# Patient Record
Sex: FEMALE | Race: WHITE | Employment: UNEMPLOYED | URBAN - METROPOLITAN AREA
[De-identification: names, ages, dates, MRNs, and addresses within clinical notes are randomized per-mention and may not be internally consistent; named-entity substitution may affect disease eponyms.]

---

## 2018-07-14 ENCOUNTER — APPOINTMENT (OUTPATIENT)
Dept: CT IMAGING | Age: 78
End: 2018-07-14
Payer: MEDICARE

## 2018-07-14 ENCOUNTER — HOSPITAL ENCOUNTER (EMERGENCY)
Age: 78
Discharge: HOME | End: 2018-07-14
Attending: EMERGENCY MEDICINE
Payer: MEDICARE

## 2018-07-14 VITALS
HEIGHT: 62 IN | OXYGEN SATURATION: 97 % | BODY MASS INDEX: 23.55 KG/M2 | SYSTOLIC BLOOD PRESSURE: 132 MMHG | TEMPERATURE: 98.1 F | WEIGHT: 128 LBS | DIASTOLIC BLOOD PRESSURE: 66 MMHG | HEART RATE: 67 BPM | RESPIRATION RATE: 16 BRPM

## 2018-07-14 DIAGNOSIS — M54.5 LOW BACK PAIN, UNSPECIFIED BACK PAIN LATERALITY, UNSPECIFIED CHRONICITY, WITH SCIATICA PRESENCE UNSPECIFIED: Primary | ICD-10-CM

## 2018-07-14 DIAGNOSIS — M51.36 LUMBAR DEGENERATIVE DISC DISEASE: ICD-10-CM

## 2018-07-14 DIAGNOSIS — M51.26 LUMBAR DISC HERNIATION: ICD-10-CM

## 2018-07-14 LAB
BACTERIA: NORMAL /HPF
BILIRUBIN URINE: NEGATIVE
BLOOD, URINE: NORMAL
CLARITY: CLEAR
COLOR: YELLOW
GLUCOSE URINE: NEGATIVE MG/DL
KETONES, URINE: NEGATIVE MG/DL
LEUKOCYTE ESTERASE, URINE: NEGATIVE
NITRITE, URINE: NEGATIVE
PH UA: 6 (ref 5–9)
PROTEIN UA: NEGATIVE MG/DL
RBC UA: NORMAL /HPF (ref 0–2)
SPECIFIC GRAVITY UA: 1.01 (ref 1–1.03)
UROBILINOGEN, URINE: 0.2 E.U./DL
WBC UA: NORMAL /HPF (ref 0–5)

## 2018-07-14 PROCEDURE — 81001 URINALYSIS AUTO W/SCOPE: CPT

## 2018-07-14 PROCEDURE — 6370000000 HC RX 637 (ALT 250 FOR IP): Performed by: EMERGENCY MEDICINE

## 2018-07-14 PROCEDURE — 72131 CT LUMBAR SPINE W/O DYE: CPT

## 2018-07-14 PROCEDURE — 99284 EMERGENCY DEPT VISIT MOD MDM: CPT

## 2018-07-14 RX ORDER — HYDROCODONE BITARTRATE AND ACETAMINOPHEN 5; 325 MG/1; MG/1
1 TABLET ORAL EVERY 6 HOURS PRN
Qty: 12 TABLET | Refills: 0 | Status: SHIPPED | OUTPATIENT
Start: 2018-07-14 | End: 2018-07-17

## 2018-07-14 RX ORDER — OXYCODONE HYDROCHLORIDE AND ACETAMINOPHEN 5; 325 MG/1; MG/1
1 TABLET ORAL ONCE
Status: COMPLETED | OUTPATIENT
Start: 2018-07-14 | End: 2018-07-14

## 2018-07-14 RX ORDER — PREDNISONE 20 MG/1
TABLET ORAL
Qty: 6 TABLET | Refills: 0 | Status: SHIPPED | OUTPATIENT
Start: 2018-07-14 | End: 2019-06-12 | Stop reason: ALTCHOICE

## 2018-07-14 RX ORDER — CYCLOBENZAPRINE HCL 5 MG
5 TABLET ORAL 3 TIMES DAILY PRN
Qty: 20 TABLET | Refills: 0 | Status: SHIPPED | OUTPATIENT
Start: 2018-07-14 | End: 2018-07-24

## 2018-07-14 RX ADMIN — OXYCODONE HYDROCHLORIDE AND ACETAMINOPHEN 1 TABLET: 5; 325 TABLET ORAL at 10:41

## 2018-07-14 ASSESSMENT — PAIN SCALES - GENERAL
PAINLEVEL_OUTOF10: 9
PAINLEVEL_OUTOF10: 9
PAINLEVEL_OUTOF10: 2

## 2018-07-14 ASSESSMENT — ENCOUNTER SYMPTOMS
NAUSEA: 0
EYE PAIN: 0
RHINORRHEA: 0
SHORTNESS OF BREATH: 0
SINUS PRESSURE: 0
EYE DISCHARGE: 0
DIARRHEA: 0
COUGH: 0
BACK PAIN: 1
ABDOMINAL PAIN: 0
EYE REDNESS: 0
ABDOMINAL DISTENTION: 0
SORE THROAT: 0
BLOOD IN STOOL: 0
VOMITING: 0
WHEEZING: 0

## 2018-07-14 ASSESSMENT — PAIN DESCRIPTION - FREQUENCY: FREQUENCY: CONTINUOUS

## 2018-07-14 ASSESSMENT — PAIN DESCRIPTION - LOCATION: LOCATION: BACK

## 2018-07-14 ASSESSMENT — PAIN DESCRIPTION - DESCRIPTORS: DESCRIPTORS: ACHING

## 2018-07-14 ASSESSMENT — PAIN DESCRIPTION - PAIN TYPE: TYPE: ACUTE PAIN

## 2018-07-14 ASSESSMENT — PAIN DESCRIPTION - ORIENTATION: ORIENTATION: RIGHT;LEFT;LOWER

## 2018-07-14 NOTE — ED PROVIDER NOTES
Patient is a 49-year-old female here with low back pain that is achy in nature and started about 2 weeks ago. She states her pain does not radiate. She states that movement makes her pain worse and rest makes it better. Patient states about 3 days ago she went to her primary care physician and was found to have urinary tract infection for which she was started on Keflex and has been taking it. Patient states her pain is different than previously. She states that she has history of lumbar spine compression fractures with last one in 2014. She states she also has had surgeries for her low back with last one in 2014. She states that she has history of osteoporosis. She states that about 2 weeks ago she was sweeping which she has done previously. She complains of bloody urine as well as increased urinary frequency. She denies falls, injuries, fever, chills, sweats, nausea/vomiting, diarrhea, constipation, bloody stool, chest pain/pressure, shortness of breath, lightheadedness, history of aneurysms, or current/prior tobacco use. Patient states that she has history of A. fib for which she is on Coumadin and has been taking it. Review of Systems   Constitutional: Negative for chills, diaphoresis and fever. HENT: Negative for ear pain, hearing loss, rhinorrhea, sinus pressure and sore throat. Eyes: Negative for pain, discharge, redness and visual disturbance. Respiratory: Negative for cough, shortness of breath and wheezing. Cardiovascular: Negative for chest pain and leg swelling. Gastrointestinal: Negative for abdominal distention, abdominal pain, blood in stool, diarrhea, nausea and vomiting. Genitourinary: Positive for dysuria, frequency and hematuria. Musculoskeletal: Positive for back pain and myalgias. Negative for arthralgias. Skin: Negative for rash and wound. Neurological: Negative for syncope, weakness, light-headedness, numbness and headaches.    Hematological: Negative for every 6 hours as needed for Pain for up to 3 days. Tobias Clements PREDNISONE (DELTASONE) 20 MG TABLET    Take 40mg daily for 3 days       Diagnosis:  1. Low back pain, unspecified back pain laterality, unspecified chronicity, with sciatica presence unspecified    2. Lumbar disc herniation    3. Lumbar degenerative disc disease        Disposition:  Patient's disposition: Discharge to home  Patient's condition is stable.          Shruti Dinero,   Resident  07/14/18 1137

## 2018-12-10 ENCOUNTER — HOSPITAL ENCOUNTER (OUTPATIENT)
Dept: CT IMAGING | Age: 78
Discharge: HOME | End: 2018-12-12
Payer: MEDICARE

## 2018-12-10 DIAGNOSIS — Z82.49 FAMILY HISTORY OF ISCHEMIC HEART DISEASE: ICD-10-CM

## 2018-12-10 DIAGNOSIS — R07.89 OTHER CHEST PAIN: ICD-10-CM

## 2018-12-10 PROCEDURE — 2580000003 HC RX 258: Performed by: RADIOLOGY

## 2018-12-10 PROCEDURE — 71275 CT ANGIOGRAPHY CHEST: CPT

## 2018-12-10 PROCEDURE — 6360000004 HC RX CONTRAST MEDICATION: Performed by: RADIOLOGY

## 2018-12-10 RX ORDER — SODIUM CHLORIDE 0.9 % (FLUSH) 0.9 %
10 SYRINGE (ML) INJECTION 2 TIMES DAILY
Status: DISCONTINUED | OUTPATIENT
Start: 2018-12-10 | End: 2018-12-13 | Stop reason: HOSPADM

## 2018-12-10 RX ADMIN — Medication 10 ML: at 09:52

## 2018-12-10 RX ADMIN — IOPAMIDOL 100 ML: 755 INJECTION, SOLUTION INTRAVENOUS at 09:52

## 2018-12-13 ENCOUNTER — HOSPITAL ENCOUNTER (OUTPATIENT)
Dept: MRI IMAGING | Age: 78
Discharge: HOME | End: 2018-12-15
Payer: MEDICARE

## 2018-12-13 DIAGNOSIS — D48.7 NEOPLASM OF UNCERTAIN BEHAVIOR OF OTHER SPECIFIED SITES: ICD-10-CM

## 2018-12-13 PROCEDURE — 6360000004 HC RX CONTRAST MEDICATION: Performed by: RADIOLOGY

## 2018-12-13 PROCEDURE — 2580000003 HC RX 258: Performed by: RADIOLOGY

## 2018-12-13 PROCEDURE — 74183 MRI ABD W/O CNTR FLWD CNTR: CPT

## 2018-12-13 PROCEDURE — A9579 GAD-BASE MR CONTRAST NOS,1ML: HCPCS | Performed by: RADIOLOGY

## 2018-12-13 RX ORDER — SODIUM CHLORIDE 0.9 % (FLUSH) 0.9 %
10 SYRINGE (ML) INJECTION 2 TIMES DAILY
Status: DISCONTINUED | OUTPATIENT
Start: 2018-12-13 | End: 2018-12-16 | Stop reason: HOSPADM

## 2018-12-13 RX ADMIN — GADOTERIDOL 20 ML: 279.3 INJECTION, SOLUTION INTRAVENOUS at 11:30

## 2018-12-13 RX ADMIN — Medication 10 ML: at 11:30

## 2019-06-12 ENCOUNTER — APPOINTMENT (OUTPATIENT)
Dept: CT IMAGING | Age: 79
DRG: 517 | End: 2019-06-12
Payer: MEDICARE

## 2019-06-12 ENCOUNTER — HOSPITAL ENCOUNTER (INPATIENT)
Age: 79
LOS: 6 days | Discharge: HOME HEALTH | DRG: 517 | End: 2019-06-18
Attending: EMERGENCY MEDICINE | Admitting: FAMILY MEDICINE
Payer: MEDICARE

## 2019-06-12 ENCOUNTER — APPOINTMENT (OUTPATIENT)
Dept: MRI IMAGING | Age: 79
DRG: 517 | End: 2019-06-12
Payer: MEDICARE

## 2019-06-12 DIAGNOSIS — S32.010A CLOSED COMPRESSION FRACTURE OF FIRST LUMBAR VERTEBRA, INITIAL ENCOUNTER: Primary | ICD-10-CM

## 2019-06-12 DIAGNOSIS — S32.000A LUMBAR COMPRESSION FRACTURE, CLOSED, INITIAL ENCOUNTER (HCC): ICD-10-CM

## 2019-06-12 LAB
APTT: 38.9 SEC (ref 24.5–35.1)
BASOPHILS ABSOLUTE: 0.03 E9/L (ref 0–0.2)
BASOPHILS RELATIVE PERCENT: 0.4 % (ref 0–2)
EOSINOPHILS ABSOLUTE: 0.02 E9/L (ref 0.05–0.5)
EOSINOPHILS RELATIVE PERCENT: 0.2 % (ref 0–6)
HCT VFR BLD CALC: 39.5 % (ref 34–48)
HEMOGLOBIN: 13 G/DL (ref 11.5–15.5)
IMMATURE GRANULOCYTES #: 0.02 E9/L
IMMATURE GRANULOCYTES %: 0.2 % (ref 0–5)
INR BLD: 3.3
LYMPHOCYTES ABSOLUTE: 1.09 E9/L (ref 1.5–4)
LYMPHOCYTES RELATIVE PERCENT: 13.3 % (ref 20–42)
MCH RBC QN AUTO: 28.4 PG (ref 26–35)
MCHC RBC AUTO-ENTMCNC: 32.9 % (ref 32–34.5)
MCV RBC AUTO: 86.4 FL (ref 80–99.9)
MONOCYTES ABSOLUTE: 0.59 E9/L (ref 0.1–0.95)
MONOCYTES RELATIVE PERCENT: 7.2 % (ref 2–12)
NEUTROPHILS ABSOLUTE: 6.46 E9/L (ref 1.8–7.3)
NEUTROPHILS RELATIVE PERCENT: 78.7 % (ref 43–80)
PDW BLD-RTO: 14.1 FL (ref 11.5–15)
PLATELET # BLD: 223 E9/L (ref 130–450)
PMV BLD AUTO: 10.1 FL (ref 7–12)
PROTHROMBIN TIME: 37.4 SEC (ref 9.3–12.4)
RBC # BLD: 4.57 E12/L (ref 3.5–5.5)
REASON FOR REJECTION: NORMAL
REJECTED TEST: NORMAL
WBC # BLD: 8.2 E9/L (ref 4.5–11.5)

## 2019-06-12 PROCEDURE — 6370000000 HC RX 637 (ALT 250 FOR IP): Performed by: STUDENT IN AN ORGANIZED HEALTH CARE EDUCATION/TRAINING PROGRAM

## 2019-06-12 PROCEDURE — 72148 MRI LUMBAR SPINE W/O DYE: CPT

## 2019-06-12 PROCEDURE — 1200000000 HC SEMI PRIVATE

## 2019-06-12 PROCEDURE — 85025 COMPLETE CBC W/AUTO DIFF WBC: CPT

## 2019-06-12 PROCEDURE — 99284 EMERGENCY DEPT VISIT MOD MDM: CPT

## 2019-06-12 PROCEDURE — 85610 PROTHROMBIN TIME: CPT

## 2019-06-12 PROCEDURE — 6370000000 HC RX 637 (ALT 250 FOR IP): Performed by: PHYSICIAN ASSISTANT

## 2019-06-12 PROCEDURE — 85730 THROMBOPLASTIN TIME PARTIAL: CPT

## 2019-06-12 PROCEDURE — 72131 CT LUMBAR SPINE W/O DYE: CPT

## 2019-06-12 PROCEDURE — 2580000003 HC RX 258: Performed by: STUDENT IN AN ORGANIZED HEALTH CARE EDUCATION/TRAINING PROGRAM

## 2019-06-12 RX ORDER — ONDANSETRON 2 MG/ML
4 INJECTION INTRAMUSCULAR; INTRAVENOUS EVERY 6 HOURS PRN
Status: CANCELLED | OUTPATIENT
Start: 2019-06-12

## 2019-06-12 RX ORDER — ONDANSETRON 2 MG/ML
4 INJECTION INTRAMUSCULAR; INTRAVENOUS EVERY 6 HOURS PRN
Status: DISCONTINUED | OUTPATIENT
Start: 2019-06-12 | End: 2019-06-18 | Stop reason: HOSPADM

## 2019-06-12 RX ORDER — DEXAMETHASONE SODIUM PHOSPHATE 4 MG/ML
4 INJECTION, SOLUTION INTRA-ARTICULAR; INTRALESIONAL; INTRAMUSCULAR; INTRAVENOUS; SOFT TISSUE EVERY 6 HOURS
Status: DISCONTINUED | OUTPATIENT
Start: 2019-06-12 | End: 2019-06-12

## 2019-06-12 RX ORDER — OXYCODONE HYDROCHLORIDE AND ACETAMINOPHEN 5; 325 MG/1; MG/1
1 TABLET ORAL ONCE
Status: COMPLETED | OUTPATIENT
Start: 2019-06-12 | End: 2019-06-12

## 2019-06-12 RX ORDER — ANTIOX #8/OM3/DHA/EPA/LUT/ZEAX 250-2.5 MG
1 CAPSULE ORAL EVERY MORNING
COMMUNITY

## 2019-06-12 RX ORDER — SODIUM CHLORIDE 0.9 % (FLUSH) 0.9 %
10 SYRINGE (ML) INJECTION PRN
Status: DISCONTINUED | OUTPATIENT
Start: 2019-06-12 | End: 2019-06-18 | Stop reason: HOSPADM

## 2019-06-12 RX ORDER — ALENDRONATE SODIUM 70 MG/1
70 TABLET ORAL
Status: DISCONTINUED | OUTPATIENT
Start: 2019-06-12 | End: 2019-06-12 | Stop reason: DRUGHIGH

## 2019-06-12 RX ORDER — SODIUM CHLORIDE 0.9 % (FLUSH) 0.9 %
10 SYRINGE (ML) INJECTION EVERY 12 HOURS SCHEDULED
Status: DISCONTINUED | OUTPATIENT
Start: 2019-06-12 | End: 2019-06-18 | Stop reason: HOSPADM

## 2019-06-12 RX ORDER — METOPROLOL SUCCINATE 50 MG/1
50 TABLET, EXTENDED RELEASE ORAL EVERY MORNING
Status: DISCONTINUED | OUTPATIENT
Start: 2019-06-13 | End: 2019-06-18 | Stop reason: HOSPADM

## 2019-06-12 RX ORDER — HYDROCODONE BITARTRATE AND ACETAMINOPHEN 5; 325 MG/1; MG/1
2 TABLET ORAL EVERY 4 HOURS PRN
Status: DISCONTINUED | OUTPATIENT
Start: 2019-06-12 | End: 2019-06-14

## 2019-06-12 RX ORDER — DOCUSATE SODIUM 100 MG/1
100 CAPSULE, LIQUID FILLED ORAL 2 TIMES DAILY
Status: DISCONTINUED | OUTPATIENT
Start: 2019-06-12 | End: 2019-06-18 | Stop reason: HOSPADM

## 2019-06-12 RX ORDER — SIMVASTATIN 40 MG
40 TABLET ORAL NIGHTLY
Status: DISCONTINUED | OUTPATIENT
Start: 2019-06-12 | End: 2019-06-18 | Stop reason: HOSPADM

## 2019-06-12 RX ORDER — LATANOPROST 50 UG/ML
1 SOLUTION/ DROPS OPHTHALMIC NIGHTLY
COMMUNITY

## 2019-06-12 RX ORDER — SODIUM CHLORIDE 0.9 % (FLUSH) 0.9 %
10 SYRINGE (ML) INJECTION PRN
Status: DISCONTINUED | OUTPATIENT
Start: 2019-06-12 | End: 2019-06-12 | Stop reason: SDUPTHER

## 2019-06-12 RX ORDER — HYDROCODONE BITARTRATE AND ACETAMINOPHEN 5; 325 MG/1; MG/1
1 TABLET ORAL EVERY 4 HOURS PRN
Status: DISCONTINUED | OUTPATIENT
Start: 2019-06-12 | End: 2019-06-14

## 2019-06-12 RX ORDER — SODIUM CHLORIDE 0.9 % (FLUSH) 0.9 %
10 SYRINGE (ML) INJECTION EVERY 12 HOURS SCHEDULED
Status: DISCONTINUED | OUTPATIENT
Start: 2019-06-12 | End: 2019-06-12 | Stop reason: SDUPTHER

## 2019-06-12 RX ORDER — ACETAMINOPHEN 325 MG/1
650 TABLET ORAL EVERY 4 HOURS PRN
Status: DISCONTINUED | OUTPATIENT
Start: 2019-06-12 | End: 2019-06-18 | Stop reason: HOSPADM

## 2019-06-12 RX ADMIN — SIMVASTATIN 40 MG: 40 TABLET, FILM COATED ORAL at 21:36

## 2019-06-12 RX ADMIN — OXYCODONE HYDROCHLORIDE AND ACETAMINOPHEN 1 TABLET: 5; 325 TABLET ORAL at 13:29

## 2019-06-12 RX ADMIN — DOCUSATE SODIUM 100 MG: 100 CAPSULE, LIQUID FILLED ORAL at 21:36

## 2019-06-12 RX ADMIN — HYDROCODONE BITARTRATE AND ACETAMINOPHEN 2 TABLET: 5; 325 TABLET ORAL at 21:36

## 2019-06-12 RX ADMIN — Medication 10 ML: at 22:45

## 2019-06-12 RX ADMIN — OXYCODONE HYDROCHLORIDE AND ACETAMINOPHEN 1 TABLET: 5; 325 TABLET ORAL at 15:45

## 2019-06-12 ASSESSMENT — PAIN DESCRIPTION - LOCATION: LOCATION: BACK

## 2019-06-12 ASSESSMENT — PAIN SCALES - GENERAL
PAINLEVEL_OUTOF10: 8
PAINLEVEL_OUTOF10: 10
PAINLEVEL_OUTOF10: 10
PAINLEVEL_OUTOF10: 0

## 2019-06-12 ASSESSMENT — PAIN DESCRIPTION - ONSET: ONSET: ON-GOING

## 2019-06-12 ASSESSMENT — PAIN DESCRIPTION - PAIN TYPE: TYPE: ACUTE PAIN

## 2019-06-12 ASSESSMENT — PAIN DESCRIPTION - DESCRIPTORS: DESCRIPTORS: ACHING;CRUSHING;CONSTANT

## 2019-06-12 ASSESSMENT — PAIN DESCRIPTION - ORIENTATION: ORIENTATION: LOWER

## 2019-06-12 ASSESSMENT — PAIN DESCRIPTION - FREQUENCY: FREQUENCY: CONTINUOUS

## 2019-06-12 NOTE — ED PROVIDER NOTES
Airway patent. Neck:  Normal ROM. Supple. Respiratory:  Clear to auscultation and breath sounds equal.  CV:  Regular rate and rhythm, normal heart sounds, without pathological murmurs, ectopy, gallops, or rubs. GI:  Abdomen Soft, nontender, good bowel sounds. No firm or pulsatile mass. Back: lower lumbar spine             Tenderness: Moderate over midline and bilateral paraspinal muscles. .             Swelling: no.              Range of Motion: diminished range with pain. CVA Tenderness: No.            Straight leg raising:  Bilateral negative. Patient has significant pain with lowering of the legs. Skin:  no erythema, mottling, ecchymosis, rash or swelling noted. Distal Function:              Motor deficit: none. Sensory deficit: none. Pulse deficit: none. 2+ distal pulses equally on both sides. Calf Tenderness:  No Bilateral.               Edema:  none Both lower extremity(s). Integument:  Normal turgor. Warm, dry, without visible rash. Lymphatics: No lymphangitis or adenopathy noted. Neurological:  Oriented. Motor functions intact.     Lab / Imaging Results   (All laboratory and radiology results have been personally reviewed by myself)  Labs:  Results for orders placed or performed during the hospital encounter of 06/12/19   CBC Auto Differential   Result Value Ref Range    WBC 8.2 4.5 - 11.5 E9/L    RBC 4.57 3.50 - 5.50 E12/L    Hemoglobin 13.0 11.5 - 15.5 g/dL    Hematocrit 39.5 34.0 - 48.0 %    MCV 86.4 80.0 - 99.9 fL    MCH 28.4 26.0 - 35.0 pg    MCHC 32.9 32.0 - 34.5 %    RDW 14.1 11.5 - 15.0 fL    Platelets 329 537 - 997 E9/L    MPV 10.1 7.0 - 12.0 fL    Neutrophils % 78.7 43.0 - 80.0 %    Immature Granulocytes % 0.2 0.0 - 5.0 %    Lymphocytes % 13.3 (L) 20.0 - 42.0 %    Monocytes % 7.2 2.0 - 12.0 %    Eosinophils % 0.2 0.0 - 6.0 %    Basophils % 0.4 0.0 - 2.0 %    Neutrophils # 6.46 1.80 - 7.30 E9/L    Immature Granulocytes # 0.02 E9/L    Lymphocytes # 1.09 (L) 1.50 - 4.00 E9/L    Monocytes # 0.59 0.10 - 0.95 E9/L    Eosinophils # 0.02 (L) 0.05 - 0.50 E9/L    Basophils # 0.03 0.00 - 0.20 E9/L   Protime-INR   Result Value Ref Range    Protime 37.4 (H) 9.3 - 12.4 sec    INR 3.3    APTT   Result Value Ref Range    aPTT 38.9 (H) 24.5 - 35.1 sec       Imaging: All Radiology results interpreted by Radiologist unless otherwise noted. CT Lumbar Spine WO Contrast   Final Result   Findings compatible with degenerative changes   Compression of the superior endplate of L1, new from previous, with   approximately 10% loss of vertebral body height   The L5-S1 disc herniation is increased in the interval      MRI THORACIC SPINE WO CONTRAST    (Results Pending)       ED Course / Medical Decision Making     Medications   dexamethasone (DECADRON) injection 4 mg (has no administration in time range)   dexamethasone (DECADRON) 12 mg in sodium chloride 0.9 % IVPB (has no administration in time range)   oxyCODONE-acetaminophen (PERCOCET) 5-325 MG per tablet 1 tablet (1 tablet Oral Given 6/12/19 1329)   oxyCODONE-acetaminophen (PERCOCET) 5-325 MG per tablet 1 tablet (1 tablet Oral Given 6/12/19 1545)        Re-examination:  6/12/19       Time: 1400   Patients symptoms are improving after percocet    Time: 1500   Discussed results with patient and she does not feel she can take care of self at home    Consult(s):    [address] PROVIDER  IP CONSULT TO IV TEAM  I spoke with the PA that works with Dr Beto Cherry who states they will see him in the hospital    I spoke with Dr Dinorah Panchal who accepted the admission    Procedure(s):   none    Medical Decision Making:    Pt presenting for acute, nontraumatic, back pain. Has history of osteoporosis and previous compression fx's requiring kyphoplasty. CT showing new compression fx and herniated disc.  She has significant pain in the ED which was difficult to control with oral pain meds. She lives at home alone and does not know she could care for herself. Neurosurg was consulted and PCP admitted. Counseling: The emergency provider has spoken with the patient and discussed todays results, in addition to providing specific details for the plan of care and counseling regarding the diagnosis and prognosis. Questions are answered at this time and they are agreeable with the plan. Assessment      1. Closed compression fracture of first lumbar vertebra, initial encounter ([facility] [address]      Plan   Admit to [facility]  Patient condition is stable    New Medications     New Prescriptions    No medications on file     Electronically signed by Jin Manriquez PA-C   DD: 6/12/19  **This report was transcribed using voice recognition software. Every effort was made to ensure accuracy; however, inadvertent computerized transcription errors may be present.   END OF ED PROVIDER NOTE         Aristides Benitez PA-C  06/12/19 1470

## 2019-06-12 NOTE — ED NOTES
Report faxed, confirmation printed, floor called and notified. RN informed that IV team consult order was placed d/t multiple unsuccessful attempts at IV.      Kymberly Ryan RN  06/12/19 7047

## 2019-06-12 NOTE — ED NOTES
Unsuccessful attempt at IV x2 by LPN and RN will seek assistance of US trained RN     Shelia Elmore, RN  06/12/19 5687

## 2019-06-13 LAB
ALBUMIN SERPL-MCNC: 3.9 G/DL (ref 3.5–5.2)
ALP BLD-CCNC: 59 U/L (ref 35–104)
ALT SERPL-CCNC: 10 U/L (ref 0–32)
ANION GAP SERPL CALCULATED.3IONS-SCNC: 10 MMOL/L (ref 7–16)
AST SERPL-CCNC: 15 U/L (ref 0–31)
BILIRUB SERPL-MCNC: 0.5 MG/DL (ref 0–1.2)
BUN BLDV-MCNC: 14 MG/DL (ref 8–23)
CALCIUM SERPL-MCNC: 8.5 MG/DL (ref 8.6–10.2)
CHLORIDE BLD-SCNC: 99 MMOL/L (ref 98–107)
CO2: 25 MMOL/L (ref 22–29)
CREAT SERPL-MCNC: 0.6 MG/DL (ref 0.5–1)
GFR AFRICAN AMERICAN: >60
GFR NON-AFRICAN AMERICAN: >60 ML/MIN/1.73
GLUCOSE BLD-MCNC: 132 MG/DL (ref 74–99)
HCT VFR BLD CALC: 36.4 % (ref 34–48)
HEMOGLOBIN: 12 G/DL (ref 11.5–15.5)
INR BLD: 4.7
MCH RBC QN AUTO: 28.2 PG (ref 26–35)
MCHC RBC AUTO-ENTMCNC: 33 % (ref 32–34.5)
MCV RBC AUTO: 85.4 FL (ref 80–99.9)
PDW BLD-RTO: 13.8 FL (ref 11.5–15)
PLATELET # BLD: 174 E9/L (ref 130–450)
PMV BLD AUTO: 9.4 FL (ref 7–12)
POTASSIUM REFLEX MAGNESIUM: 4.2 MMOL/L (ref 3.5–5)
PROTHROMBIN TIME: 51.3 SEC (ref 9.3–12.4)
RBC # BLD: 4.26 E12/L (ref 3.5–5.5)
SODIUM BLD-SCNC: 134 MMOL/L (ref 132–146)
TOTAL PROTEIN: 6.6 G/DL (ref 6.4–8.3)
WBC # BLD: 5.3 E9/L (ref 4.5–11.5)

## 2019-06-13 PROCEDURE — 99222 1ST HOSP IP/OBS MODERATE 55: CPT | Performed by: INTERNAL MEDICINE

## 2019-06-13 PROCEDURE — 6360000002 HC RX W HCPCS: Performed by: PHYSICIAN ASSISTANT

## 2019-06-13 PROCEDURE — 36415 COLL VENOUS BLD VENIPUNCTURE: CPT

## 2019-06-13 PROCEDURE — 85027 COMPLETE CBC AUTOMATED: CPT

## 2019-06-13 PROCEDURE — 85610 PROTHROMBIN TIME: CPT

## 2019-06-13 PROCEDURE — 2580000003 HC RX 258: Performed by: STUDENT IN AN ORGANIZED HEALTH CARE EDUCATION/TRAINING PROGRAM

## 2019-06-13 PROCEDURE — 6370000000 HC RX 637 (ALT 250 FOR IP): Performed by: STUDENT IN AN ORGANIZED HEALTH CARE EDUCATION/TRAINING PROGRAM

## 2019-06-13 PROCEDURE — 80053 COMPREHEN METABOLIC PANEL: CPT

## 2019-06-13 PROCEDURE — APPSS45 APP SPLIT SHARED TIME 31-45 MINUTES: Performed by: PHYSICIAN ASSISTANT

## 2019-06-13 PROCEDURE — 6370000000 HC RX 637 (ALT 250 FOR IP): Performed by: FAMILY MEDICINE

## 2019-06-13 PROCEDURE — 1200000000 HC SEMI PRIVATE

## 2019-06-13 PROCEDURE — 93005 ELECTROCARDIOGRAM TRACING: CPT | Performed by: INTERNAL MEDICINE

## 2019-06-13 RX ORDER — PHYTONADIONE 5 MG/1
5 TABLET ORAL ONCE
Status: COMPLETED | OUTPATIENT
Start: 2019-06-13 | End: 2019-06-13

## 2019-06-13 RX ADMIN — DOCUSATE SODIUM 100 MG: 100 CAPSULE, LIQUID FILLED ORAL at 21:04

## 2019-06-13 RX ADMIN — Medication 10 ML: at 02:50

## 2019-06-13 RX ADMIN — HYDROCODONE BITARTRATE AND ACETAMINOPHEN 2 TABLET: 5; 325 TABLET ORAL at 08:33

## 2019-06-13 RX ADMIN — HYDROCODONE BITARTRATE AND ACETAMINOPHEN 2 TABLET: 5; 325 TABLET ORAL at 21:51

## 2019-06-13 RX ADMIN — SIMVASTATIN 40 MG: 40 TABLET, FILM COATED ORAL at 21:04

## 2019-06-13 RX ADMIN — ONDANSETRON 4 MG: 2 INJECTION INTRAMUSCULAR; INTRAVENOUS at 02:50

## 2019-06-13 RX ADMIN — PHYTONADIONE 5 MG: 5 TABLET ORAL at 15:35

## 2019-06-13 RX ADMIN — DOCUSATE SODIUM 100 MG: 100 CAPSULE, LIQUID FILLED ORAL at 12:53

## 2019-06-13 RX ADMIN — HYDROCODONE BITARTRATE AND ACETAMINOPHEN 2 TABLET: 5; 325 TABLET ORAL at 12:53

## 2019-06-13 RX ADMIN — HYDROCODONE BITARTRATE AND ACETAMINOPHEN 2 TABLET: 5; 325 TABLET ORAL at 17:42

## 2019-06-13 RX ADMIN — Medication 10 ML: at 08:11

## 2019-06-13 RX ADMIN — HYDROCODONE BITARTRATE AND ACETAMINOPHEN 2 TABLET: 5; 325 TABLET ORAL at 02:50

## 2019-06-13 RX ADMIN — Medication 10 ML: at 21:04

## 2019-06-13 ASSESSMENT — PAIN DESCRIPTION - ORIENTATION
ORIENTATION: LOWER

## 2019-06-13 ASSESSMENT — PAIN - FUNCTIONAL ASSESSMENT
PAIN_FUNCTIONAL_ASSESSMENT: PREVENTS OR INTERFERES SOME ACTIVE ACTIVITIES AND ADLS

## 2019-06-13 ASSESSMENT — PAIN DESCRIPTION - DESCRIPTORS
DESCRIPTORS: ACHING;DISCOMFORT
DESCRIPTORS: ACHING;DISCOMFORT
DESCRIPTORS: ACHING;CRUSHING;CONSTANT
DESCRIPTORS: ACHING;DISCOMFORT
DESCRIPTORS: ACHING;DISCOMFORT;DULL

## 2019-06-13 ASSESSMENT — PAIN SCALES - GENERAL
PAINLEVEL_OUTOF10: 7
PAINLEVEL_OUTOF10: 8
PAINLEVEL_OUTOF10: 0
PAINLEVEL_OUTOF10: 0
PAINLEVEL_OUTOF10: 8
PAINLEVEL_OUTOF10: 10
PAINLEVEL_OUTOF10: 7

## 2019-06-13 ASSESSMENT — PAIN DESCRIPTION - LOCATION
LOCATION: BACK

## 2019-06-13 ASSESSMENT — PAIN DESCRIPTION - ONSET
ONSET: ON-GOING
ONSET: GRADUAL
ONSET: ON-GOING
ONSET: GRADUAL
ONSET: GRADUAL

## 2019-06-13 ASSESSMENT — PAIN DESCRIPTION - PAIN TYPE
TYPE: ACUTE PAIN
TYPE: SURGICAL PAIN
TYPE: ACUTE PAIN

## 2019-06-13 ASSESSMENT — PAIN DESCRIPTION - PROGRESSION
CLINICAL_PROGRESSION: GRADUALLY WORSENING

## 2019-06-13 ASSESSMENT — PAIN DESCRIPTION - FREQUENCY
FREQUENCY: CONTINUOUS

## 2019-06-13 NOTE — PROGRESS NOTES
Pharmacy Note    Juan Ramon Yue was ordered Fosamax 70 mg weekly. Per the [address] [place] [address], this medication is non-formulary and not stocked by the Pharmacy. The medication can be reordered at discharge.

## 2019-06-13 NOTE — PROGRESS NOTES
Called Dr. Gamaliel Porter via office and left a detailed message for his nurse regarding plans for kyphoplasty.

## 2019-06-13 NOTE — H&P
History and Physical    CHIEF COMPLAINT: Severe low back pain    HISTORY OF PRESENT ILLNESS:    The patient is a 66 y.o. female patient with history of significant osteoporosis and recurrent compression fractures who presented to the ER with a 2 day history of worsening back pain. Patient denies any injury or trauma. In the ER, patient found to have L1 compression fracture and is now admitted for further evaluation and treatment. Patient otherwise denies headache, change of vision, chest pain, shortness of breath, palpitations, syncope, lower extremity edema, and/or any neurological signs or symptoms. Past Medical History:    Past Medical History:   Diagnosis Date    Atrial fibrillation ([address]     Hyperlipidemia     Osteoporosis      Past Surgical History:    Past Surgical History:   Procedure Laterality Date    APPENDECTOMY      FIXATION KYPHOPLASTY      FIXATION KYPHOPLASTY  1/5/13    t 5    HYSTERECTOMY       Medications Prior to Admission:    Medications Prior to Admission: Calcium Carb-Cholecalciferol (CALCIUM 600+D) 600-800 MG-UNIT TABS, Take 1 tablet by mouth every morning  latanoprost (XALATAN) 0.005 % ophthalmic solution, Place 1 drop into both eyes nightly  Multiple Vitamins-Minerals (PRESERVISION AREDS 2) CAPS, Take 1 capsule by mouth every morning  warfarin (COUMADIN) 3 MG tablet, Take 3.5 mg by mouth Daily with supper   vitamin D (CHOLECALCIFEROL) 1000 UNIT TABS tablet, Take 1,000 Units by mouth every morning   metoprolol (TOPROL-XL) 50 MG XL tablet, Take 1 tablet by mouth daily. (Patient taking differently: Take 50 mg by mouth every morning )  docusate sodium (COLACE) 100 MG capsule, Take 100 mg by mouth 2 times daily   simvastatin (ZOCOR) 40 MG tablet, Take 40 mg by mouth nightly. alendronate (FOSAMAX) 70 MG tablet, Take 70 mg by mouth every 7 days On Saturdays    Allergies:    Bacitracin    Social History:    reports that she has never smoked.  She has never used smokeless tobacco. She reports that she does not drink alcohol or use drugs. Family History:   family history is not on file. REVIEW OF SYSTEMS:  As above in the HPI, otherwise negative    PHYSICAL EXAM:    Vitals:  BP (!) 105/53   Pulse 57   Temp 98.2 °F (36.8 °C) (Oral)   Resp 16   Ht 5' 2\" (1.575 m)   Wt 130 lb (59 kg)   SpO2 95%   BMI 23.78 kg/m²     General:  Awake, alert, oriented X 3. Well developed, well nourished. No apparent distress. HEENT:  Normocephalic, atraumatic. Pupils equal, round, reactive. No scleral icterus. No conjunctival injection. Oral and nasal mucosa normal in appearance. Oropharynx is clear. MMM. Neck:  Supple without LAD, thyromegaly, or bruits. Heart:  RRR without murmurs, gallops, rubs  Lungs:  CTA bilaterally without wheezes, rales, or rhonchi. Symmetrical expansion. Abdomen: Soft, NT/ND, no masses, or organomegaly. Bowel sounds present and normoactive. Extremities:  No clubbing, cyanosis, or edema. No tenderness to palpation. Skin:  Warm and dry, no open lesions or rash  Neuro:  Cranial nerves 2-12 intact, no focal deficits  Breast: deferred  Rectal: deferred  Musculoskeletal: Tenderness over upper lumbar spine. Genitalia:  deferred    LABS:  As per chart and reviewed. ASSESSMENT:    1. Compression fracture of L1 lumbar vertebra  2. Atrial fibrillation  3. Hyperlipidemia  4. Osteoporosis    PLAN:  1. Admit general floor. 2. Consult orthopedics. 3. See orders. 4. Home when stable.     Ancelmo Laura  8:07 AM  6/13/2019

## 2019-06-13 NOTE — PROGRESS NOTES
Esteban Pablo from Dr. Davis Pontiff office called back and states that patients INR is too high to proceed with procedure as of now. Patient may have a diet. Orders will be placed. Charge RN notified.

## 2019-06-13 NOTE — CONSULTS
[address] [address]                                  CONSULTATION    PATIENT NAME: Reilly Harley                       :        1940  MED REC NO:   [id number]                            ROOM:       7886  ACCOUNT NO:   [de-identified]                           ADMIT DATE: 2019  PROVIDER:     Rojas Lee    CONSULT DATE:  2019    CHIEF COMPLAINT:  Low back pain. HISTORY OF PRESENT ILLNESS:  The patient is a 70-year-old female who  complains of low back pain which started two days ago. She does not  recall a specific injury and states that she was just doing some work in  her kitchen when she noticed the severe pain across her low back. She  denies mid back pain or radiating lower extremity symptoms. She does  have a history of multiple kyphoplasties in the past and is familiar to  Dr. Dede Small service. PAST MEDICAL HISTORY:  Osteoporosis, hyperlipidemia, and AFib. PAST SURGICAL HISTORY:  Multiple kyphoplasties, appendectomy, and  hysterectomy. FAMILY HISTORY:  Noncontributory. SOCIAL HISTORY:  Negative for tobacco and alcohol. ALLERGIES:  BACITRACIN. MEDICATION:  Listed in chart. REVIEW OF SYSTEMS:  She denies any problems with her head, eyes, ears,  nose, throat. She denies chest pain, shortness of breath, abdominal  pain or headaches. PHYSICAL EXAMINATION:  GENERAL:  She is an alert and oriented 70-year-old female, weighs 130  pounds. EXTREMITIES:  Motor strength and sensation is intact in the lower  extremities. HEENT:  Head:  Normocephalic. Eyes:  PERRLA. Ears:  TMs intact. Nose:  Nares patent. Throat:  No erythema. HEART:  Regular rate and rhythm. LUNGS:  Clear to auscultation. ABDOMEN:  Soft, nontender. Bowel sounds are intact. SKIN:  Intact without hair patricia, lesions, or discoloration. NEUROLOGICAL:  Cranial nerves II through XII intact.   CHEST: Symmetric expansion. NECK:  Full range of motion. BACK:  She does have some paraspinal tenderness in the lumbar region. LYMPH NODES:  No lymphadenopathy. PELVIC/RECTAL/BREASTS/HERNIA:  Exams deferred. DIAGNOSTIC DATA:  I did review the lumbar MRI that she had done showing  a less than 10% loss of vertical dimension of the vertebral body of L1  with compression deformity of superior endplate. IMPRESSION:  1. Acute L1 compression fracture. 2.  Intractable low back pain. 3.  History of multiple kyphoplasties. 4.  Osteoporosis. PLAN:  The patient will be reviewed and discussed with Dr. Alisa Barahona. We  will likely get her scheduled for a kyphoplasty in the next couple of  days. She is on Coumadin, so will have to be off of the Coumadin for a  couple of days at least.  The procedure was again reviewed with the  patient including the benefits, risks, alternatives and complications  and postop course, and all questions were answered to her satisfaction. She is interested in proceeding with the kyphoplasty.         India Hooks    D: 06/13/2019 9:11:36       T: 06/13/2019 10:04:48     [email or web address]  Job#: [id number]     Doc#: [id number]    CC:

## 2019-06-13 NOTE — PATIENT CARE CONFERENCE
P Quality Flow/Interdisciplinary Rounds Progress Note        Quality Flow Rounds held on June 13, 2019    Disciplines Attending:  Bedside Nurse, ,  and Nursing Unit Leadership    Claudia Mendiola was admitted on 6/12/2019 12:27 PM    Anticipated Discharge Date:       Disposition:    Leobardo Score:  Leobardo Scale Score: 23    Readmission Risk              Risk of Unplanned Readmission:        9           Discussed patient goal for the day, patient clinical progression, and barriers to discharge. The following Goal(s) of the Day/Commitment(s) have been identified:  Pain control. Safety.        Edwin Preston  June 13, 2019

## 2019-06-13 NOTE — CONSULTS
Inpatient Cardiology Consultation      Reason for Consult:  Pre-operative cardiac evaluation, hx Afib    Consulting Physician: Dr. Aylin Balbuena    Requesting Physician:  Dr. Dinorah Panchal    Date of Consultation: 6/13/2019    HISTORY OF PRESENT ILLNESS:    This is a very pleasant 66year old female who is known to the practice through Dr. Lio Steiner with a known medical history of PAF (diagnosed in 2004 with spontaneous return to NSR; recurrence in 8/14 with spontaneous return to NSR), chronic anticoagulation with Coumadin, hyperlipidemia (on Zocor 40 mg po daily), DJD (hx compression fractures with most recent kyphoplasty in 2014 to T10 and T11). She routinely is able to walk 1 mile daily and do household chores without any associated exertional chest pain, MELGAR, or palpitations. She denies any prior syncopal events. She denies any recent bleeding issues. 6/11/19 at 5 pm, she was standing in the kitchen when she developed sudden low back pain bilaterally rated 10/10. She denied any injury or fall. She denied any chest pain, palpitations, or SOB. CT spine confirmed L1 compression fracture. K 4.2, BUN 14, creatinine 0.6, H/H 13.0/39.5, INR 3.3. She is scheduled for kyphoplasty pending cardiology evaluation. Please note: past medical records were reviewed per electronic medical record (EMR) - see detailed reports under Past Medical/ Surgical History. Past Medical History:    Past Medical History:   Diagnosis Date    Atrial fibrillation ([facility] [address]     Hyperlipidemia     Osteoporosis      · PAF (diagnosed in 2004 with spontaneous return to NSR; recurrence in 8/14 with spontaneous return to NSR)  · chronic anticoagulation with Coumadin  ·  hyperlipidemia (on Zocor 40 mg po daily)  ·  DJD (hx compression fractures with most recent kyphoplasty in 2014 to T10 and T11)    Echo 8/14:   Summary   Normal left ventricle size and systolic function   Mild concentric left ventricular hypertrophy   Mild aortic regurgitation is noted. Study performed in sinus rhythm    Past Surgical History:    Past Surgical History:   Procedure Laterality Date    APPENDECTOMY      FIXATION KYPHOPLASTY      FIXATION KYPHOPLASTY  1/5/13    t 5    HYSTERECTOMY         Medications Prior to admit:  Prior to Admission medications    Medication Sig Start Date End Date Taking? Authorizing Provider   Calcium Carb-Cholecalciferol (CALCIUM 600+D) 600-800 MG-UNIT TABS Take 1 tablet by mouth every morning   Yes Historical Provider, MD   latanoprost (XALATAN) 0.005 % ophthalmic solution Place 1 drop into both eyes nightly   Yes Historical Provider, MD   Multiple Vitamins-Minerals (PRESERVISION AREDS 2) CAPS Take 1 capsule by mouth every morning   Yes Historical Provider, MD   warfarin (COUMADIN) 3 MG tablet Take 3.5 mg by mouth Daily with supper    Yes Historical Provider, MD   vitamin D (CHOLECALCIFEROL) 1000 UNIT TABS tablet Take 1,000 Units by mouth every morning    Yes Historical Provider, MD   metoprolol (TOPROL-XL) 50 MG XL tablet Take 1 tablet by mouth daily. Patient taking differently: Take 50 mg by mouth every morning  8/29/14  Yes Crystal Barragan MD   docusate sodium (COLACE) 100 MG capsule Take 100 mg by mouth 2 times daily    Yes Historical Provider, MD   simvastatin (ZOCOR) 40 MG tablet Take 40 mg by mouth nightly.      Yes Historical Provider, MD   alendronate (FOSAMAX) 70 MG tablet Take 70 mg by mouth every 7 days On Saturdays    Historical Provider, MD       Current Medications:    Current Facility-Administered Medications: ondansetron (ZOFRAN) injection 4 mg, 4 mg, Intravenous, Q6H PRN  docusate sodium (COLACE) capsule 100 mg, 100 mg, Oral, BID  metoprolol succinate (TOPROL XL) extended release tablet 50 mg, 50 mg, Oral, QAM  simvastatin (ZOCOR) tablet 40 mg, 40 mg, Oral, Nightly  vitamin D (CHOLECALCIFEROL) tablet 1,000 Units, 1,000 Units, Oral, QAM  sodium chloride flush 0.9 % injection 10 mL, 10 mL, Intravenous, 2 times per day  sodium chloride flush 0.9 % injection 10 mL, 10 mL, Intravenous, PRN  acetaminophen (TYLENOL) tablet 650 mg, 650 mg, Oral, Q4H PRN  magnesium hydroxide (MILK OF MAGNESIA) 400 MG/5ML suspension 30 mL, 30 mL, Oral, Daily PRN  HYDROcodone-acetaminophen (NORCO) 5-325 MG per tablet 1 tablet, 1 tablet, Oral, Q4H PRN **OR** HYDROcodone-acetaminophen (NORCO) 5-325 MG per tablet 2 tablet, 2 tablet, Oral, Q4H PRN    Allergies:  Bacitracin    Social History:    Social History     Socioeconomic History    Marital status:       Spouse name: Not on file    Number of children: Not on file    Years of education: Not on file    Highest education level: Not on file   Occupational History    Not on file   Social Needs    Financial resource strain: Not on file    Food insecurity:     Worry: Not on file     Inability: Not on file    Transportation needs:     Medical: Not on file     Non-medical: Not on file   Tobacco Use    Smoking status: Never Smoker    Smokeless tobacco: Never Used   Substance and Sexual Activity    Alcohol use: No    Drug use: No    Sexual activity: Not on file   Lifestyle    Physical activity:     Days per week: Not on file     Minutes per session: Not on file    Stress: Not on file   Relationships    Social connections:     Talks on phone: Not on file     Gets together: Not on file     Attends Shinto service: Not on file     Active member of club or organization: Not on file     Attends meetings of clubs or organizations: Not on file     Relationship status: Not on file    Intimate partner violence:     Fear of current or ex partner: Not on file     Emotionally abused: Not on file     Physically abused: Not on file     Forced sexual activity: Not on file   Other Topics Concern    Not on file   Social History Narrative    Not on file       Family History:   Father- CABG age 52  Sister- TAA  Age 67    REVIEW OF SYSTEMS:     · Constitutional: + fatigue, fevers, chills or night sweats  · Eyes: Denies visual changes or drainage  · ENT: Denies headaches or hearing loss. No mouth sores or sore throat. No epistaxis   · Cardiovascular: Denies chest pain, pressure or palpitations. No lower extremity swelling. · Respiratory: Denies MELGAR, cough, orthopnea or PND. No hemoptysis   · Gastrointestinal: Denies hematemesis or anorexia. No hematochezia or melena    · Genitourinary: Denies urgency, dysuria or hematuria. · Musculoskeletal: Denies gait disturbance, + weakness and low back joint complaints  · Integumentary: Denies rash, hives or pruritis   · Neurological: Denies dizziness, headaches or seizures. No numbness or tingling  · Psychiatric: Denies anxiety or depression. · Endocrine: Denies temperature intolerance. No recent weight change. .  · Hematologic/Lymphatic: Denies abnormal bruising or bleeding. No swollen lymph nodes    PHYSICAL EXAM:   BP (!) 105/53   Pulse 57   Temp 98.2 °F (36.8 °C) (Oral)   Resp 16   Ht 5' 2\" (1.575 m)   Wt 130 lb (59 kg)   SpO2 95%   BMI 23.78 kg/m²   CONST:  Well developed, well nourished who appears of stated age. Awake, alert and cooperative. No apparent distress. HEENT:   Head- Normocephalic, atraumatic   Eyes- Conjunctivae pink, anicteric  Throat- Oral mucosa pink and moist  Neck-  No stridor, trachea midline, no jugular venous distention. No carotid bruit. CHEST: Chest symmetrical and non-tender to palpation. No accessory muscle use or intercostal retractions  RESPIRATORY: Lung sounds - clear throughout fields   CARDIOVASCULAR:     Heart Inspection- shows no noted pulsations  Heart Palpation- no heaves or thrills; PMI is non-displaced   Heart Ausculation- Regular rate and rhythm, no murmur. No s3, s4 or rub   PV: No lower extremity edema. No varicosities. Pedal pulses palpable, no clubbing or cyanosis   ABDOMEN: Soft, non-tender to light palpation. Bowel sounds present. No palpable masses no organomegaly; no abdominal bruit  MS: Good muscle strength and tone.  No atrophy or abnormal movements. : Deferred  SKIN: Warm and dry no statis dermatitis or ulcers   NEURO / PSYCH: Oriented to person, place and time. Speech clear and appropriate. Follows all commands. Pleasant affect     DATA:    ECG / Tele strips: EKG pending    Diagnostic:  MRI spine:  Impression:          Less than 10% loss of vertical dimension of the vertebral body of L1  with compression deformity of superior endplate. Mild central and bilateral foraminal stenosis at L4-5. CT spine:  Impression:        Findings compatible with degenerative changes  Compression of the superior endplate of L1, new from previous, with  approximately 10% loss of vertebral body height  The L5-S1 disc herniation is increased in the interval       No intake or output data in the 24 hours ending 06/13/19 1242    Labs:   CBC:   Recent Labs     06/12/19  1726 06/13/19  0625   WBC 8.2 5.3   HGB 13.0 12.0   HCT 39.5 36.4    174     BMP:   Recent Labs     06/13/19  0625      K 4.2   CO2 25   BUN 14   CREATININE 0.6   LABGLOM >60   CALCIUM 8.5*     Mag: No results for input(s): MG in the last 72 hours. Phos: No results for input(s): PHOS in the last 72 hours. TSH: No results for input(s): TSH in the last 72 hours. HgA1c: No results found for: LABA1C  No results found for: EAG  proBNP: No results for input(s): PROBNP in the last 72 hours. PT/INR:   Recent Labs     06/12/19  1726   PROTIME 37.4*   INR 3.3     APTT:  Recent Labs     06/12/19  1726   APTT 38.9*     CARDIAC ENZYMES:No results for input(s): CKTOTAL, CKMB, CKMBINDEX, TROPONINI in the last 72 hours. FASTING LIPID PANEL:  Lab Results   Component Value Date    CHOL 188 02/10/2015    HDL 89 02/10/2015    LDLCALC 86 02/10/2015    TRIG 65 02/10/2015     LIVER PROFILE:  Recent Labs     06/13/19  0625   AST 15   ALT 10   LABALBU 3.9     Assessment and Recommendations are to follow as per Dr. Lenny Kat.  [facility]    Electronically signed by CHALINO Gonzales on 6/13/2019 at 12:42 PM     The above documentation has been prepared under my direction and personally reviewed by me in its entirety. I confirm that the note above accurately reflects all work, treatment, procedures, and medical decision making performed by me. The patient's history was independently obtained. The patient was independently examined. Electrocardiogram, prior and present cardiovascular assessment, and laboratory studies were reviewed. The patient is a 70-year-old white female known to [facility] with previous cardiovascular care provided by Viann Schirmer. Has a previous history of paroxysmal atrial fibrillation presently maintained in sinus rhythm with associated anticoagulation. She additionally has a history of hyperlipidemia. She is active with functional capabilities of approximately 5 METs with no active cardiovascular symptoms beyond that of intermittent transient palpitations which she describes as the ability to sense an isolated irregular heartbeat without additional arrhythmia related symptoms. He was most recently clinically and objectively evaluated approximately 5 years earlier with an echocardiogram at that time demonstrating evidence of mild concentric left ventricular hypertrophy with normal left ventricular systolic function and mild aortic insufficiency rate She denies symptoms of a focal neurologic origin or bleeding in the face of chronic oral anticoagulation. She is additionally suffered chronic back pain with previous surgical intervention with kyphoplasty and presents with recurrent back pain and a L1 compression fracture. On examination, she appears in no discomfort no distress and is hemodynamically stable with vital signs as documented. Jugular venous pressure appears normal with no identified carotid bruits. Lung fields are clear to auscultation.   Cardiac examination still very regular rate and rhythm with no gallop rhythm and the presence of a soft apical holosystolic murmur. A benign abdominal examination is present with no peripheral edema noted. Diagnostic Assessment and Plan: On a clinical basis, the patient appears stable from a cardiovascular standpoint in the face of a history of paroxysmal atrial fibrillation and present documentation of sinus rhythm. She would be at low risk of acute ischemic events with her proposed noncardiac surgical procedure with recommended administration of her beta-blocker with a sip of water on the day of the procedure to provide ischemia protection, blood pressure control and arrhythmia stabilization. Present withholding of her anticoagulation is appropriate with needs of normalization of her anticoagulation status preoperatively. Cautious periprocedural fluid administration will be necessary to reduce risk of iatrogenic volume overload. Once surgery is complete and appropriate hemostasis has been achieved, resumption of anticoagulation because of maintaining prothrombin times in the range of 2.0-2.5 times INR control will be advisable to reduce risk of embolic events. Appropriate risk factor monitoring of blood pressure and serum lipids will assist in reducing risk of atherosclerotic development. We will further evaluate her during hospitalization should additional cardiovascular difficulties or concerns arise. Thank you for allowing me to participate in your patient's care. Please feel free to contact me if you have any questions or concerns. Kenton Shock.  Gayle [place], [address] [facility]

## 2019-06-14 LAB
EKG ATRIAL RATE: 58 BPM
EKG P AXIS: 56 DEGREES
EKG P-R INTERVAL: 154 MS
EKG Q-T INTERVAL: 428 MS
EKG QRS DURATION: 82 MS
EKG QTC CALCULATION (BAZETT): 420 MS
EKG R AXIS: 2 DEGREES
EKG T AXIS: 28 DEGREES
EKG VENTRICULAR RATE: 58 BPM
INR BLD: 2.2
PROTHROMBIN TIME: 25.2 SEC (ref 9.3–12.4)

## 2019-06-14 PROCEDURE — 93010 ELECTROCARDIOGRAM REPORT: CPT | Performed by: INTERNAL MEDICINE

## 2019-06-14 PROCEDURE — 6370000000 HC RX 637 (ALT 250 FOR IP): Performed by: FAMILY MEDICINE

## 2019-06-14 PROCEDURE — 1200000000 HC SEMI PRIVATE

## 2019-06-14 PROCEDURE — 6370000000 HC RX 637 (ALT 250 FOR IP): Performed by: STUDENT IN AN ORGANIZED HEALTH CARE EDUCATION/TRAINING PROGRAM

## 2019-06-14 PROCEDURE — 2580000003 HC RX 258: Performed by: STUDENT IN AN ORGANIZED HEALTH CARE EDUCATION/TRAINING PROGRAM

## 2019-06-14 PROCEDURE — 85610 PROTHROMBIN TIME: CPT

## 2019-06-14 PROCEDURE — 36415 COLL VENOUS BLD VENIPUNCTURE: CPT

## 2019-06-14 PROCEDURE — 6360000002 HC RX W HCPCS: Performed by: FAMILY MEDICINE

## 2019-06-14 PROCEDURE — 6360000002 HC RX W HCPCS: Performed by: PHYSICIAN ASSISTANT

## 2019-06-14 RX ORDER — OXYCODONE HYDROCHLORIDE AND ACETAMINOPHEN 5; 325 MG/1; MG/1
2 TABLET ORAL EVERY 4 HOURS PRN
Status: DISCONTINUED | OUTPATIENT
Start: 2019-06-14 | End: 2019-06-18 | Stop reason: HOSPADM

## 2019-06-14 RX ORDER — OXYCODONE HYDROCHLORIDE AND ACETAMINOPHEN 5; 325 MG/1; MG/1
1 TABLET ORAL EVERY 4 HOURS PRN
Status: DISCONTINUED | OUTPATIENT
Start: 2019-06-14 | End: 2019-06-18 | Stop reason: HOSPADM

## 2019-06-14 RX ADMIN — OXYCODONE HYDROCHLORIDE AND ACETAMINOPHEN 2 TABLET: 5; 325 TABLET ORAL at 22:41

## 2019-06-14 RX ADMIN — Medication 10 ML: at 08:39

## 2019-06-14 RX ADMIN — DOCUSATE SODIUM 100 MG: 100 CAPSULE, LIQUID FILLED ORAL at 08:38

## 2019-06-14 RX ADMIN — HYDROMORPHONE HYDROCHLORIDE 0.5 MG: 1 INJECTION, SOLUTION INTRAMUSCULAR; INTRAVENOUS; SUBCUTANEOUS at 18:19

## 2019-06-14 RX ADMIN — Medication 10 ML: at 22:41

## 2019-06-14 RX ADMIN — ONDANSETRON 4 MG: 2 INJECTION INTRAMUSCULAR; INTRAVENOUS at 22:40

## 2019-06-14 RX ADMIN — HYDROCODONE BITARTRATE AND ACETAMINOPHEN 2 TABLET: 5; 325 TABLET ORAL at 02:33

## 2019-06-14 RX ADMIN — DOCUSATE SODIUM 100 MG: 100 CAPSULE, LIQUID FILLED ORAL at 22:40

## 2019-06-14 RX ADMIN — HYDROMORPHONE HYDROCHLORIDE 0.5 MG: 1 INJECTION, SOLUTION INTRAMUSCULAR; INTRAVENOUS; SUBCUTANEOUS at 14:16

## 2019-06-14 RX ADMIN — SIMVASTATIN 40 MG: 40 TABLET, FILM COATED ORAL at 22:41

## 2019-06-14 RX ADMIN — VITAMIN D, TAB 1000IU (100/BT) 1000 UNITS: 25 TAB at 08:38

## 2019-06-14 RX ADMIN — METOPROLOL SUCCINATE 50 MG: 50 TABLET, EXTENDED RELEASE ORAL at 08:38

## 2019-06-14 RX ADMIN — OXYCODONE HYDROCHLORIDE AND ACETAMINOPHEN 2 TABLET: 5; 325 TABLET ORAL at 17:14

## 2019-06-14 RX ADMIN — OXYCODONE HYDROCHLORIDE AND ACETAMINOPHEN 2 TABLET: 5; 325 TABLET ORAL at 08:38

## 2019-06-14 RX ADMIN — OXYCODONE HYDROCHLORIDE AND ACETAMINOPHEN 2 TABLET: 5; 325 TABLET ORAL at 13:11

## 2019-06-14 ASSESSMENT — PAIN DESCRIPTION - LOCATION
LOCATION: BACK

## 2019-06-14 ASSESSMENT — PAIN - FUNCTIONAL ASSESSMENT

## 2019-06-14 ASSESSMENT — PAIN DESCRIPTION - ONSET
ONSET: ON-GOING

## 2019-06-14 ASSESSMENT — PAIN DESCRIPTION - PAIN TYPE
TYPE: ACUTE PAIN
TYPE: CHRONIC PAIN
TYPE: ACUTE PAIN
TYPE: ACUTE PAIN

## 2019-06-14 ASSESSMENT — PAIN SCALES - GENERAL
PAINLEVEL_OUTOF10: 0
PAINLEVEL_OUTOF10: 10
PAINLEVEL_OUTOF10: 0
PAINLEVEL_OUTOF10: 9
PAINLEVEL_OUTOF10: 9
PAINLEVEL_OUTOF10: 10
PAINLEVEL_OUTOF10: 10
PAINLEVEL_OUTOF10: 9
PAINLEVEL_OUTOF10: 0
PAINLEVEL_OUTOF10: 8
PAINLEVEL_OUTOF10: 10
PAINLEVEL_OUTOF10: 6
PAINLEVEL_OUTOF10: 10

## 2019-06-14 ASSESSMENT — PAIN DESCRIPTION - ORIENTATION
ORIENTATION: LOWER

## 2019-06-14 ASSESSMENT — PAIN DESCRIPTION - DESCRIPTORS
DESCRIPTORS: CONSTANT;DISCOMFORT;PRESSURE;SORE
DESCRIPTORS: ACHING;DISCOMFORT;DULL
DESCRIPTORS: ACHING;CONSTANT;DISCOMFORT
DESCRIPTORS: ACHING;CONSTANT;DISCOMFORT
DESCRIPTORS: ACHING;DISCOMFORT
DESCRIPTORS: ACHING;CONSTANT;DISCOMFORT
DESCRIPTORS: ACHING;CONSTANT;DISCOMFORT

## 2019-06-14 ASSESSMENT — PAIN DESCRIPTION - PROGRESSION
CLINICAL_PROGRESSION: GRADUALLY WORSENING

## 2019-06-14 ASSESSMENT — PAIN DESCRIPTION - FREQUENCY
FREQUENCY: CONTINUOUS
FREQUENCY: INTERMITTENT
FREQUENCY: CONTINUOUS

## 2019-06-14 NOTE — PROGRESS NOTES
P Quality Flow/Interdisciplinary Rounds Progress Note        Quality Flow Rounds held on June 14, 2019    Disciplines Attending:  Bedside Nurse, ,  and Nursing Unit Leadership    Adrian Pineda was admitted on 6/12/2019 12:27 PM    Anticipated Discharge Date:       Disposition:    Leobardo Score:  Leobardo Scale Score: 20    Readmission Risk              Risk of Unplanned Readmission:        10           Discussed patient goal for the day, patient clinical progression, and barriers to discharge.   The following Goal(s) of the Day/Commitment(s) have been identified:  Pain Control      Beto Preston  June 14, 2019

## 2019-06-14 NOTE — PLAN OF CARE
Problem: Falls - Risk of:  Goal: Will remain free from falls  Description  Will remain free from falls  6/14/2019 1046 by Rob Kim RN  Outcome: Ongoing     Problem: Pain:  Goal: Pain level will decrease  Description  Pain level will decrease  Outcome: Ongoing

## 2019-06-14 NOTE — CARE COORDINATION
Social Work:    Mrs. Clarisa Weinstein is expected to undergo a kypoplasty Saturday. Social service met with Mrs. Clarisa Weinstein Wilman Franco), explained social work role, and discussed discharge planning. Lissett resides independently in her one-story home. She has no home durable medical equipment and advises that she has had kypoplasty surgery in the past.  Upon discharge Lissett denied the need for [address] or DME.       Electronically signed by KEDAR Case on 6/14/2019 at 1:43 PM

## 2019-06-15 ENCOUNTER — ANESTHESIA EVENT (OUTPATIENT)
Dept: OPERATING ROOM | Age: 79
DRG: 517 | End: 2019-06-15
Payer: MEDICARE

## 2019-06-15 LAB
APTT: 25.8 SEC (ref 24.5–35.1)
INR BLD: 1.1
PROTHROMBIN TIME: 12.9 SEC (ref 9.3–12.4)

## 2019-06-15 PROCEDURE — 2580000003 HC RX 258: Performed by: STUDENT IN AN ORGANIZED HEALTH CARE EDUCATION/TRAINING PROGRAM

## 2019-06-15 PROCEDURE — 85610 PROTHROMBIN TIME: CPT

## 2019-06-15 PROCEDURE — 85730 THROMBOPLASTIN TIME PARTIAL: CPT

## 2019-06-15 PROCEDURE — 36415 COLL VENOUS BLD VENIPUNCTURE: CPT

## 2019-06-15 PROCEDURE — 6370000000 HC RX 637 (ALT 250 FOR IP): Performed by: STUDENT IN AN ORGANIZED HEALTH CARE EDUCATION/TRAINING PROGRAM

## 2019-06-15 PROCEDURE — 6370000000 HC RX 637 (ALT 250 FOR IP): Performed by: FAMILY MEDICINE

## 2019-06-15 PROCEDURE — 1200000000 HC SEMI PRIVATE

## 2019-06-15 PROCEDURE — 6360000002 HC RX W HCPCS: Performed by: PHYSICIAN ASSISTANT

## 2019-06-15 PROCEDURE — 6360000002 HC RX W HCPCS: Performed by: FAMILY MEDICINE

## 2019-06-15 RX ADMIN — OXYCODONE HYDROCHLORIDE AND ACETAMINOPHEN 2 TABLET: 5; 325 TABLET ORAL at 13:56

## 2019-06-15 RX ADMIN — Medication 10 ML: at 06:30

## 2019-06-15 RX ADMIN — HYDROMORPHONE HYDROCHLORIDE 0.5 MG: 1 INJECTION, SOLUTION INTRAMUSCULAR; INTRAVENOUS; SUBCUTANEOUS at 01:45

## 2019-06-15 RX ADMIN — Medication 10 ML: at 20:54

## 2019-06-15 RX ADMIN — OXYCODONE HYDROCHLORIDE AND ACETAMINOPHEN 2 TABLET: 5; 325 TABLET ORAL at 08:54

## 2019-06-15 RX ADMIN — HYDROMORPHONE HYDROCHLORIDE 0.5 MG: 1 INJECTION, SOLUTION INTRAMUSCULAR; INTRAVENOUS; SUBCUTANEOUS at 10:32

## 2019-06-15 RX ADMIN — Medication 10 ML: at 01:45

## 2019-06-15 RX ADMIN — OXYCODONE HYDROCHLORIDE AND ACETAMINOPHEN 2 TABLET: 5; 325 TABLET ORAL at 23:16

## 2019-06-15 RX ADMIN — SIMVASTATIN 40 MG: 40 TABLET, FILM COATED ORAL at 20:54

## 2019-06-15 RX ADMIN — OXYCODONE HYDROCHLORIDE AND ACETAMINOPHEN 2 TABLET: 5; 325 TABLET ORAL at 18:28

## 2019-06-15 RX ADMIN — HYDROMORPHONE HYDROCHLORIDE 0.5 MG: 1 INJECTION, SOLUTION INTRAMUSCULAR; INTRAVENOUS; SUBCUTANEOUS at 06:30

## 2019-06-15 RX ADMIN — DOCUSATE SODIUM 100 MG: 100 CAPSULE, LIQUID FILLED ORAL at 20:54

## 2019-06-15 RX ADMIN — OXYCODONE HYDROCHLORIDE AND ACETAMINOPHEN 2 TABLET: 5; 325 TABLET ORAL at 04:26

## 2019-06-15 RX ADMIN — Medication 10 ML: at 08:55

## 2019-06-15 RX ADMIN — METOPROLOL SUCCINATE 50 MG: 50 TABLET, EXTENDED RELEASE ORAL at 08:54

## 2019-06-15 RX ADMIN — HYDROMORPHONE HYDROCHLORIDE 0.5 MG: 1 INJECTION, SOLUTION INTRAMUSCULAR; INTRAVENOUS; SUBCUTANEOUS at 15:33

## 2019-06-15 RX ADMIN — ONDANSETRON 4 MG: 2 INJECTION INTRAMUSCULAR; INTRAVENOUS at 12:56

## 2019-06-15 RX ADMIN — HYDROMORPHONE HYDROCHLORIDE 0.5 MG: 1 INJECTION, SOLUTION INTRAMUSCULAR; INTRAVENOUS; SUBCUTANEOUS at 20:53

## 2019-06-15 ASSESSMENT — PAIN DESCRIPTION - ONSET
ONSET: ON-GOING

## 2019-06-15 ASSESSMENT — PAIN DESCRIPTION - FREQUENCY
FREQUENCY: CONTINUOUS
FREQUENCY: CONTINUOUS
FREQUENCY: INTERMITTENT
FREQUENCY: CONTINUOUS
FREQUENCY: INTERMITTENT
FREQUENCY: CONTINUOUS
FREQUENCY: INTERMITTENT
FREQUENCY: CONTINUOUS

## 2019-06-15 ASSESSMENT — PAIN - FUNCTIONAL ASSESSMENT
PAIN_FUNCTIONAL_ASSESSMENT: PREVENTS OR INTERFERES SOME ACTIVE ACTIVITIES AND ADLS
PAIN_FUNCTIONAL_ASSESSMENT: ACTIVITIES ARE NOT PREVENTED
PAIN_FUNCTIONAL_ASSESSMENT: PREVENTS OR INTERFERES SOME ACTIVE ACTIVITIES AND ADLS
PAIN_FUNCTIONAL_ASSESSMENT: ACTIVITIES ARE NOT PREVENTED

## 2019-06-15 ASSESSMENT — PAIN DESCRIPTION - LOCATION
LOCATION: BACK

## 2019-06-15 ASSESSMENT — PAIN DESCRIPTION - DESCRIPTORS
DESCRIPTORS: ACHING;CONSTANT;DISCOMFORT
DESCRIPTORS: DISCOMFORT;PRESSURE;TENDER
DESCRIPTORS: DISCOMFORT;JABBING;SORE
DESCRIPTORS: DISCOMFORT;SHARP;SORE
DESCRIPTORS: ACHING;CONSTANT;DISCOMFORT
DESCRIPTORS: ACHING;DISCOMFORT;DULL

## 2019-06-15 ASSESSMENT — PAIN DESCRIPTION - PAIN TYPE
TYPE: ACUTE PAIN
TYPE: CHRONIC PAIN
TYPE: ACUTE PAIN
TYPE: CHRONIC PAIN
TYPE: ACUTE PAIN
TYPE: ACUTE PAIN
TYPE: CHRONIC PAIN
TYPE: ACUTE PAIN
TYPE: ACUTE PAIN

## 2019-06-15 ASSESSMENT — PAIN SCALES - GENERAL
PAINLEVEL_OUTOF10: 9
PAINLEVEL_OUTOF10: 0
PAINLEVEL_OUTOF10: 9
PAINLEVEL_OUTOF10: 0
PAINLEVEL_OUTOF10: 10
PAINLEVEL_OUTOF10: 9
PAINLEVEL_OUTOF10: 0
PAINLEVEL_OUTOF10: 10
PAINLEVEL_OUTOF10: 9
PAINLEVEL_OUTOF10: 10
PAINLEVEL_OUTOF10: 8
PAINLEVEL_OUTOF10: 0
PAINLEVEL_OUTOF10: 0
PAINLEVEL_OUTOF10: 10
PAINLEVEL_OUTOF10: 9
PAINLEVEL_OUTOF10: 9
PAINLEVEL_OUTOF10: 7
PAINLEVEL_OUTOF10: 10
PAINLEVEL_OUTOF10: 7
PAINLEVEL_OUTOF10: 9
PAINLEVEL_OUTOF10: 7

## 2019-06-15 ASSESSMENT — PAIN DESCRIPTION - PROGRESSION
CLINICAL_PROGRESSION: GRADUALLY WORSENING

## 2019-06-15 ASSESSMENT — PAIN DESCRIPTION - ORIENTATION
ORIENTATION: LOWER

## 2019-06-15 NOTE — PLAN OF CARE
Problem: Falls - Risk of:  Goal: Will remain free from falls  Description  Will remain free from falls  Outcome: Met This Shift  Goal: Absence of physical injury  Description  Absence of physical injury  Outcome: Met This Shift     Problem: Pain:  Goal: Pain level will decrease  Description  Pain level will decrease  Outcome: Met This Shift  Goal: Control of acute pain  Description  Control of acute pain  Outcome: Met This Shift  Goal: Control of chronic pain  Description  Control of chronic pain  Outcome: Met This Shift     Problem:  Activity:  Goal: Ability to tolerate increased activity will improve  Description  Ability to tolerate increased activity will improve  Outcome: Met This Shift  Goal: Expression of feelings of increased energy will increase  Description  Expression of feelings of increased energy will increase  Outcome: Met This Shift     Problem: Cardiac:  Goal: Ability to maintain an adequate cardiac output will improve  Description  Ability to maintain an adequate cardiac output will improve  Outcome: Met This Shift  Goal: Complications related to the disease process, condition or treatment will be avoided or minimized  Description  Complications related to the disease process, condition or treatment will be avoided or minimized  Outcome: Met This Shift     Problem: Health Behavior:  Goal: Ability to manage health-related needs will improve  Description  Ability to manage health-related needs will improve  Outcome: Met This Shift     Problem: Safety:  Goal: Ability to remain free from injury will improve  Description  Ability to remain free from injury will improve  Outcome: Met This Shift  Goal: Will show no signs and symptoms of excessive bleeding  Description  Will show no signs and symptoms of excessive bleeding  Outcome: Met This Shift

## 2019-06-15 NOTE — PATIENT CARE CONFERENCE
P Quality Flow/Interdisciplinary Rounds Progress Note        Quality Flow Rounds held on Beth 15, 2019    Disciplines Attending:  Bedside Nurse, ,  and Nursing Unit Leadership    Raquel Gallego was admitted on 6/12/2019 12:27 PM    Anticipated Discharge Date:       Disposition:    Leobardo Score:  Leobardo Scale Score: 20    Readmission Risk              Risk of Unplanned Readmission:        10           Discussed patient goal for the day, patient clinical progression, and barriers to discharge. The following Goal(s) of the Day/Commitment(s) have been identified:  Possible OR today/pain control/comfort.       Maria Ines Martinez  Beth 15, 2019

## 2019-06-15 NOTE — PROGRESS NOTES
Called Dr. Timo Chris per RN TM   Reason INR 1.1.   Jeanne Edmond  6/15/2019  7:50 AM  Dr. Brooks Chan on call

## 2019-06-15 NOTE — PROGRESS NOTES
Subjective:    Awake and alert. Continues to complain of back pain  Denies chest pain or dyspnea. Denies abdominal pain. NPO possible kyphoplasty    Objective:    BP (!) 121/58   Pulse 69   Temp 97.8 °F (36.6 °C) (Oral)   Resp 16   Ht 5' 2\" (1.575 m)   Wt 131 lb 12.8 oz (59.8 kg) Comment: Recalculated the Bed  SpO2 97%   BMI 24.11 kg/m²   Skin: Warm and dry  Neck: Supple, no JVD  Heart:  RRR, no murmurs, gallops, or rubs. Lungs:  CTA bilaterally, no wheeze, rales or rhonchi  Abd: bowel sounds present, nontender, nondistended, no masses  Extrem:  No clubbing, cyanosis, or edema, pulses intact    I/O last 3 completed shifts: In: 725 [P.O.:725]  Out: -     Laboratory:     BMP:    Lab Results   Component Value Date     06/13/2019    K 4.2 06/13/2019    CL 99 06/13/2019    CO2 25 06/13/2019    BUN 14 06/13/2019    LABALBU 3.9 06/13/2019    LABALBU 4.4 06/07/2011    CREATININE 0.6 06/13/2019    CALCIUM 8.5 06/13/2019    GFRAA >60 06/13/2019    LABGLOM >60 06/13/2019    GLUCOSE 132 06/13/2019    GLUCOSE 95 01/11/2011     PT/INR:    Lab Results   Component Value Date    PROTIME 12.9 06/15/2019    INR 1.1 06/15/2019        MRI LUMBAR SPINE WO CONTRAST   Final Result      Less than 10% loss of vertical dimension of the vertebral body of L1   with compression deformity of superior endplate. Mild central and bilateral foraminal stenosis at L4-5. CT Lumbar Spine WO Contrast   Final Result   Findings compatible with degenerative changes   Compression of the superior endplate of L1, new from previous, with   approximately 10% loss of vertebral body height   The L5-S1 disc herniation is increased in the interval          Prior to Admission medications    Medication Sig Start Date End Date Taking?  Authorizing Provider   Calcium Carb-Cholecalciferol (CALCIUM 600+D) 600-800 MG-UNIT TABS Take 1 tablet by mouth every morning   Yes Historical Provider, MD   latanoprost (XALATAN) 0.005 % ophthalmic solution Place 1 drop into both eyes nightly   Yes Historical Provider, MD   Multiple Vitamins-Minerals (PRESERVISION AREDS 2) CAPS Take 1 capsule by mouth every morning   Yes Historical Provider, MD   warfarin (COUMADIN) 3 MG tablet Take 3.5 mg by mouth Daily with supper    Yes Historical Provider, MD   vitamin D (CHOLECALCIFEROL) 1000 UNIT TABS tablet Take 1,000 Units by mouth every morning    Yes Historical Provider, MD   metoprolol (TOPROL-XL) 50 MG XL tablet Take 1 tablet by mouth daily. Patient taking differently: Take 50 mg by mouth every morning  8/29/14  Yes Ginger Molina MD   docusate sodium (COLACE) 100 MG capsule Take 100 mg by mouth 2 times daily    Yes Historical Provider, MD   simvastatin (ZOCOR) 40 MG tablet Take 40 mg by mouth nightly.      Yes Historical Provider, MD   alendronate (FOSAMAX) 70 MG tablet Take 70 mg by mouth every 7 days On Saturdays    Historical Provider, MD        Current Facility-Administered Medications   Medication Dose Route Frequency Provider Last Rate Last Dose    oxyCODONE-acetaminophen (PERCOCET) 5-325 MG per tablet 2 tablet  2 tablet Oral Q4H PRN Samira Hdz MD   2 tablet at 06/15/19 0854    oxyCODONE-acetaminophen (PERCOCET) 5-325 MG per tablet 1 tablet  1 tablet Oral Q4H PRN Samira Hdz MD        HYDROmorphone (DILAUDID) injection 0.5 mg  0.5 mg Intravenous Q3H PRN Samira Hdz MD   0.5 mg at 06/15/19 1032    ondansetron (ZOFRAN) injection 4 mg  4 mg Intravenous Q6H PRN Becky Berumen PA-C   4 mg at 06/15/19 1256    docusate sodium (COLACE) capsule 100 mg  100 mg Oral BID Argelia Radha, DO   100 mg at 06/14/19 2240    metoprolol succinate (TOPROL XL) extended release tablet 50 mg  50 mg Oral QAM Argelia Radha, DO   50 mg at 06/15/19 0854    simvastatin (ZOCOR) tablet 40 mg  40 mg Oral Nightly Argelia Radha, DO   40 mg at 06/14/19 2241    vitamin D (CHOLECALCIFEROL) tablet 1,000 Units  1,000 Units Oral QAM Argelia Radha, DO   1,000 Units at 06/14/19 5797    sodium chloride flush 0.9 % injection 10 mL  10 mL Intravenous 2 times per day Anne-Marie Esters, DO   10 mL at 06/15/19 0855    sodium chloride flush 0.9 % injection 10 mL  10 mL Intravenous PRN Anne-Marie Esters, DO   10 mL at 06/15/19 0630    acetaminophen (TYLENOL) tablet 650 mg  650 mg Oral Q4H PRN Anne-Marie Esters, DO        magnesium hydroxide (MILK OF MAGNESIA) 400 MG/5ML suspension 30 mL  30 mL Oral Daily PRN Anne-Marie Esters, DO               Problem list:    Active Problems:    Lumbar compression fracture, closed, initial encounter ([facility] [address]    Closed compression fracture of first lumbar vertebra (HCC)    Paroxysmal atrial fibrillation ([facility] [address]    Preoperative cardiovascular examination  Resolved Problems:    * No resolved hospital problems. *      Assessment:    1. Lumbar compression fracture    2. Atrial fibrillation    3. Hyperlipidemia    4.   Osteoporosis    Plan:    INR corrected    Await kyphoplasty      Glen Chin D.O. [facility]  1:29 PM  6/15/2019

## 2019-06-15 NOTE — PROGRESS NOTES
Spoke with Dr. Beth Major, on call for Dr. Gamaliel Porter. Informed of INR 1.1 today and possible kyphoplasty today.

## 2019-06-15 NOTE — ANESTHESIA PRE PROCEDURE
Department of Anesthesiology  Preprocedure Note       Name:  Chris Guy   Age:  66 y.o.  :  1940                                          MRN:  [id number]         Date:  6/15/2019      Surgeon: Nia Hurst):  Eri Hall DO    Procedure: KYPHOPLASTY L1 (N/A )    Medications prior to admission:   Prior to Admission medications    Medication Sig Start Date End Date Taking? Authorizing Provider   Calcium Carb-Cholecalciferol (CALCIUM 600+D) 600-800 MG-UNIT TABS Take 1 tablet by mouth every morning   Yes Historical Provider, MD   latanoprost (XALATAN) 0.005 % ophthalmic solution Place 1 drop into both eyes nightly   Yes Historical Provider, MD   Multiple Vitamins-Minerals (PRESERVISION AREDS 2) CAPS Take 1 capsule by mouth every morning   Yes Historical Provider, MD   warfarin (COUMADIN) 3 MG tablet Take 3.5 mg by mouth Daily with supper    Yes Historical Provider, MD   vitamin D (CHOLECALCIFEROL) 1000 UNIT TABS tablet Take 1,000 Units by mouth every morning    Yes Historical Provider, MD   metoprolol (TOPROL-XL) 50 MG XL tablet Take 1 tablet by mouth daily. Patient taking differently: Take 50 mg by mouth every morning  14  Yes Phyllis Christianson MD   docusate sodium (COLACE) 100 MG capsule Take 100 mg by mouth 2 times daily    Yes Historical Provider, MD   simvastatin (ZOCOR) 40 MG tablet Take 40 mg by mouth nightly.      Yes Historical Provider, MD   alendronate (FOSAMAX) 70 MG tablet Take 70 mg by mouth every 7 days On     Historical Provider, MD       Current medications:    Current Facility-Administered Medications   Medication Dose Route Frequency Provider Last Rate Last Dose    oxyCODONE-acetaminophen (PERCOCET) 5-325 MG per tablet 2 tablet  2 tablet Oral Q4H PRN Jesse Pruitt MD   2 tablet at 06/15/19 1356    oxyCODONE-acetaminophen (PERCOCET) 5-325 MG per tablet 1 tablet  1 tablet Oral Q4H PRN Jesse Pruitt MD        HYDROmorphone (DILAUDID) injection 0.5 mg  0.5 mg Intravenous Q3H PRN Aneta Miranda MD   0.5 mg at 06/15/19 1032    ondansetron (ZOFRAN) injection 4 mg  4 mg Intravenous Q6H PRN Becky Berumen PA-C   4 mg at 06/15/19 1256    docusate sodium (COLACE) capsule 100 mg  100 mg Oral BID [place] Staggers, DO   100 mg at 06/14/19 2240    metoprolol succinate (TOPROL XL) extended release tablet 50 mg  50 mg Oral [facility] Staggers, DO   50 mg at 06/15/19 0854    simvastatin (ZOCOR) tablet 40 mg  40 mg Oral Nightly [place] St[place], DO   40 mg at 06/14/19 2241    vitamin D (CHOLECALCIFEROL) tablet 1,000 Units  1,000 [facility]   1,000 Units at 06/14/19 7984    sodium chloride flush 0.9 % injection 10 mL  10 mL Intravenous 2 times per day [place] Stag[place], DO   10 mL at 06/15/19 0855    sodium chloride flush 0.9 % injection 10 mL  10 mL Intravenous PRN [place] Stag[place], DO   10 mL at 06/15/19 0630    acetaminophen (TYLENOL) tablet 650 mg  650 mg Oral Q4H PRN [place] Staggers, DO        magnesium hydroxide (MILK OF MAGNESIA) 400 MG/5ML suspension 30 mL  30 mL Oral Daily PRN [place] Staggers, DO           Allergies:     Allergies   Allergen Reactions    Bacitracin      Eye ointment       Problem List:    Patient Active Problem List   Diagnosis Code    Hyperlipidemia E78.5    Atrial fibrillation with rapid ventricular response (HCC) I48.91    Lumbar compression fracture, closed, initial encounter ([facility] [address] S32.000A    Closed compression fracture of first lumbar vertebra (HCC) S32.010A    Paroxysmal atrial fibrillation (HCC) I48.0    Preoperative cardiovascular examination Z01.810       Past Medical History:        Diagnosis Date    Atrial fibrillation ([facility] [address]     Hyperlipidemia     Osteoporosis        Past Surgical History:        Procedure Laterality Date    APPENDECTOMY      FIXATION KYPHOPLASTY      FIXATION KYPHOPLASTY  1/5/13    t 5    HYSTERECTOMY         Social History:    Social History     Tobacco Use    Smoking status: Never Smoker    Smokeless tobacco: Never Used   Substance Use Topics    Alcohol use: No                                Counseling given: Not Answered      Vital Signs (Current):   Vitals:    06/14/19 0745 06/14/19 2045 06/15/19 0044 06/15/19 0748   BP: (!) 145/63 138/80  (!) 121/58   Pulse: 78 72  69   Resp: 16 16  16   Temp: 97.8 °F (36.6 °C) 97.9 °F (36.6 °C)  97.8 °F (36.6 °C)   TempSrc: Oral Axillary  Oral   SpO2: 96% 96%  97%   Weight:   131 lb 12.8 oz (59.8 kg)    Height:                                                  BP Readings from Last 3 Encounters:   06/15/19 (!) 121/58   07/14/18 132/66   10/22/17 (!) 150/78       NPO Status:                                                                                 BMI:   Wt Readings from Last 3 Encounters:   06/15/19 131 lb 12.8 oz (59.8 kg)   07/14/18 128 lb (58.1 kg)   09/17/14 121 lb (54.9 kg)     Body mass index is 24.11 kg/m². CBC:   Lab Results   Component Value Date    WBC 5.3 06/13/2019    RBC 4.26 06/13/2019    HGB 12.0 06/13/2019    HCT 36.4 06/13/2019    MCV 85.4 06/13/2019    RDW 13.8 06/13/2019     06/13/2019       CMP:   Lab Results   Component Value Date     06/13/2019    K 4.2 06/13/2019    CL 99 06/13/2019    CO2 25 06/13/2019    BUN 14 06/13/2019    CREATININE 0.6 06/13/2019    GFRAA >60 06/13/2019    LABGLOM >60 06/13/2019    GLUCOSE 132 06/13/2019    GLUCOSE 95 01/11/2011    PROT 6.6 06/13/2019    CALCIUM 8.5 06/13/2019    BILITOT 0.5 06/13/2019    ALKPHOS 59 06/13/2019    AST 15 06/13/2019    ALT 10 06/13/2019       POC Tests: No results for input(s): POCGLU, POCNA, POCK, POCCL, POCBUN, POCHEMO, POCHCT in the last 72 hours.     Coags:   Lab Results   Component Value Date    PROTIME 12.9 06/15/2019    INR 1.1 06/15/2019    APTT 25.8 06/15/2019       HCG (If Applicable): No results found for: PREGTESTUR, PREGSERUM, HCG, HCGQUANT     ABGs: No results found for: PHART, PO2ART, PNW6PWD, KRK8TXF, BEART, F0YGUDFX     Type & Screen (If Applicable):  No results found for: BRISA FLORES     TTE procedure:Echo Complete W/Doppler & Color Flow. Procedure Date  Date: 08/27/2014 Start: 01:28 PM     Study Location: Echo Lab  Technical Quality: Adequate visualization   Indications:Atrial fibrillation. Patient Status: Routine   Height: 62 inches Weight: 119 pounds BSA: 1.53 m^2 BMI: 21.77 kg/m^2     HR: 82 bpm      Findings      Left Ventricle   Normal left ventricle size and systolic function   Indeterminate diastolic function   No wall motion abnormalities   Mild concentric left ventricular hypertrophy      Right Ventricle   Normal right ventricle structure and function. Left Atrium   Normal left atrium. Right Atrium   Normal right atrium. Mitral Valve   Physiologic and/or trace mitral regurgitation is present. Tricuspid Valve   Normal tricuspid valve structure and function. Aortic Valve   The aortic valve is trileaflet. Mild aortic regurgitation is noted. Pulmonic Valve   Normal pulmonic valve structure and function. Pericardial Effusion   No evidence for pericardial effusion. Aorta   Normal aortic root   Miscellaneous   The inferior vena cava diameter is normal with normal respiratory   variation. Conclusions      Summary   Normal left ventricle size and systolic function   Mild concentric left ventricular hypertrophy   Mild aortic regurgitation is noted. Study performed in sinus rhythm      Signature   ----------------------------------------------------------------   Electronically signed by Estephania Carvajal MD    Anesthesia Evaluation  Patient summary reviewed   history of anesthetic complications: PONV. Airway: Mallampati: II  TM distance: >3 FB   Neck ROM: full  Mouth opening: > = 3 FB Dental: normal exam         Pulmonary:Negative Pulmonary ROS breath sounds clear to auscultation                             Cardiovascular:    (+) dysrhythmias (Paroxysmal A-fib.  Current ECG - Sinus Bradycardia): atrial fibrillation, hyperlipidemia      ECG reviewed  Rhythm: regular  Rate: normal  Echocardiogram reviewed                  Neuro/Psych:   Negative Neuro/Psych ROS              GI/Hepatic/Renal: Neg GI/Hepatic/Renal ROS            Endo/Other: Negative Endo/Other ROS                    Abdominal:           Vascular: negative vascular ROS. Anesthesia Plan      MAC     ASA 3       Induction: intravenous. Anesthetic plan and risks discussed with patient. Single level Kyphoplasty. Patient good candidate for [facility]. Will discuss with Dr. Juvenal Clark.       Grace Rosario MD   6/15/2019

## 2019-06-15 NOTE — PLAN OF CARE
Problem: Falls - Risk of:  Goal: Will remain free from falls  Description  Will remain free from falls  6/15/2019 2015 by Priscilla Couch RN  Outcome: Met This Shift  6/15/2019 0219 by Rohit Dai RN  Outcome: Met This Shift  Goal: Absence of physical injury  Description  Absence of physical injury  6/15/2019 0822 by Priscilla Couch RN  Outcome: Met This Shift  6/15/2019 0219 by Rohit Dai RN  Outcome: Met This Shift     Problem: Pain:  Goal: Pain level will decrease  Description  Pain level will decrease  6/15/2019 0822 by Priscilla Couch RN  Outcome: Met This Shift  6/15/2019 0219 by Rohit Dai RN  Outcome: Met This Shift  Goal: Control of acute pain  Description  Control of acute pain  6/15/2019 0822 by Priscilla Couch RN  Outcome: Met This Shift  6/15/2019 0219 by Rohit Dai RN  Outcome: Met This Shift  Goal: Control of chronic pain  Description  Control of chronic pain  6/15/2019 0219 by Rohit Dai RN  Outcome: Met This Shift     Problem:  Activity:  Goal: Ability to tolerate increased activity will improve  Description  Ability to tolerate increased activity will improve  6/15/2019 0822 by Priscilla Couch RN  Outcome: Met This Shift  6/15/2019 0219 by Rohit Dai RN  Outcome: Met This Shift  Goal: Expression of feelings of increased energy will increase  Description  Expression of feelings of increased energy will increase  6/15/2019 0822 by Priscilla Couch RN  Outcome: Met This Shift  6/15/2019 0219 by Rohit Dai RN  Outcome: Met This Shift     Problem: Cardiac:  Goal: Ability to maintain an adequate cardiac output will improve  Description  Ability to maintain an adequate cardiac output will improve  6/15/2019 0219 by Rohit Dai RN  Outcome: Met This Shift  Goal: Complications related to the disease process, condition or treatment will be avoided or minimized  Description  Complications related to the disease process, condition or treatment will be avoided or minimized  6/15/2019 0219 by Ameena Davila RN  Outcome: Met This Shift     Problem: Health Behavior:  Goal: Ability to manage health-related needs will improve  Description  Ability to manage health-related needs will improve  6/15/2019 0219 by Ameena Davila RN  Outcome: Met This Shift     Problem: Safety:  Goal: Ability to remain free from injury will improve  Description  Ability to remain free from injury will improve  6/15/2019 0822 by Miguelangel Ferrari RN  Outcome: Met This Shift  6/15/2019 0219 by Ameena Davila RN  Outcome: Met This Shift  Goal: Will show no signs and symptoms of excessive bleeding  Description  Will show no signs and symptoms of excessive bleeding  6/15/2019 0219 by Ameena Davila RN  Outcome: Met This Shift

## 2019-06-16 ENCOUNTER — APPOINTMENT (OUTPATIENT)
Dept: GENERAL RADIOLOGY | Age: 79
DRG: 517 | End: 2019-06-16
Payer: MEDICARE

## 2019-06-16 ENCOUNTER — ANESTHESIA (OUTPATIENT)
Dept: OPERATING ROOM | Age: 79
DRG: 517 | End: 2019-06-16
Payer: MEDICARE

## 2019-06-16 PROCEDURE — 6360000002 HC RX W HCPCS: Performed by: PHYSICIAN ASSISTANT

## 2019-06-16 PROCEDURE — 6360000002 HC RX W HCPCS: Performed by: FAMILY MEDICINE

## 2019-06-16 PROCEDURE — 6370000000 HC RX 637 (ALT 250 FOR IP): Performed by: STUDENT IN AN ORGANIZED HEALTH CARE EDUCATION/TRAINING PROGRAM

## 2019-06-16 PROCEDURE — 6370000000 HC RX 637 (ALT 250 FOR IP): Performed by: FAMILY MEDICINE

## 2019-06-16 PROCEDURE — 1200000000 HC SEMI PRIVATE

## 2019-06-16 PROCEDURE — 2580000003 HC RX 258: Performed by: STUDENT IN AN ORGANIZED HEALTH CARE EDUCATION/TRAINING PROGRAM

## 2019-06-16 RX ADMIN — HYDROMORPHONE HYDROCHLORIDE 0.5 MG: 1 INJECTION, SOLUTION INTRAMUSCULAR; INTRAVENOUS; SUBCUTANEOUS at 05:02

## 2019-06-16 RX ADMIN — HYDROMORPHONE HYDROCHLORIDE 0.5 MG: 1 INJECTION, SOLUTION INTRAMUSCULAR; INTRAVENOUS; SUBCUTANEOUS at 23:45

## 2019-06-16 RX ADMIN — ONDANSETRON 4 MG: 2 INJECTION INTRAMUSCULAR; INTRAVENOUS at 21:44

## 2019-06-16 RX ADMIN — Medication 10 ML: at 10:01

## 2019-06-16 RX ADMIN — OXYCODONE HYDROCHLORIDE AND ACETAMINOPHEN 2 TABLET: 5; 325 TABLET ORAL at 17:20

## 2019-06-16 RX ADMIN — SIMVASTATIN 40 MG: 40 TABLET, FILM COATED ORAL at 19:51

## 2019-06-16 RX ADMIN — DOCUSATE SODIUM 100 MG: 100 CAPSULE, LIQUID FILLED ORAL at 19:51

## 2019-06-16 RX ADMIN — ONDANSETRON 4 MG: 2 INJECTION INTRAMUSCULAR; INTRAVENOUS at 08:07

## 2019-06-16 RX ADMIN — Medication 10 ML: at 00:37

## 2019-06-16 RX ADMIN — OXYCODONE HYDROCHLORIDE AND ACETAMINOPHEN 2 TABLET: 5; 325 TABLET ORAL at 21:44

## 2019-06-16 RX ADMIN — HYDROMORPHONE HYDROCHLORIDE 0.5 MG: 1 INJECTION, SOLUTION INTRAMUSCULAR; INTRAVENOUS; SUBCUTANEOUS at 13:14

## 2019-06-16 RX ADMIN — Medication 10 ML: at 23:46

## 2019-06-16 RX ADMIN — Medication 10 ML: at 08:07

## 2019-06-16 RX ADMIN — HYDROMORPHONE HYDROCHLORIDE 0.5 MG: 1 INJECTION, SOLUTION INTRAMUSCULAR; INTRAVENOUS; SUBCUTANEOUS at 10:01

## 2019-06-16 RX ADMIN — HYDROMORPHONE HYDROCHLORIDE 0.5 MG: 1 INJECTION, SOLUTION INTRAMUSCULAR; INTRAVENOUS; SUBCUTANEOUS at 16:18

## 2019-06-16 RX ADMIN — Medication 10 ML: at 16:18

## 2019-06-16 RX ADMIN — HYDROMORPHONE HYDROCHLORIDE 0.5 MG: 1 INJECTION, SOLUTION INTRAMUSCULAR; INTRAVENOUS; SUBCUTANEOUS at 00:37

## 2019-06-16 RX ADMIN — HYDROMORPHONE HYDROCHLORIDE 0.5 MG: 1 INJECTION, SOLUTION INTRAMUSCULAR; INTRAVENOUS; SUBCUTANEOUS at 19:50

## 2019-06-16 RX ADMIN — Medication 10 ML: at 19:51

## 2019-06-16 RX ADMIN — OXYCODONE HYDROCHLORIDE AND ACETAMINOPHEN 2 TABLET: 5; 325 TABLET ORAL at 12:13

## 2019-06-16 RX ADMIN — OXYCODONE HYDROCHLORIDE AND ACETAMINOPHEN 1 TABLET: 5; 325 TABLET ORAL at 08:06

## 2019-06-16 RX ADMIN — METOPROLOL SUCCINATE 50 MG: 50 TABLET, EXTENDED RELEASE ORAL at 08:07

## 2019-06-16 ASSESSMENT — PAIN DESCRIPTION - LOCATION
LOCATION: BACK

## 2019-06-16 ASSESSMENT — PAIN - FUNCTIONAL ASSESSMENT
PAIN_FUNCTIONAL_ASSESSMENT: ACTIVITIES ARE NOT PREVENTED
PAIN_FUNCTIONAL_ASSESSMENT: PREVENTS OR INTERFERES SOME ACTIVE ACTIVITIES AND ADLS

## 2019-06-16 ASSESSMENT — PAIN DESCRIPTION - ONSET
ONSET: ON-GOING

## 2019-06-16 ASSESSMENT — PAIN DESCRIPTION - DESCRIPTORS
DESCRIPTORS: ACHING;DISCOMFORT;CONSTANT
DESCRIPTORS: ACHING;DISCOMFORT;CONSTANT
DESCRIPTORS: ACHING;CONSTANT;DISCOMFORT
DESCRIPTORS: ACHING;DISCOMFORT;CRAMPING
DESCRIPTORS: ACHING;CONSTANT;DISCOMFORT
DESCRIPTORS: ACHING;DISCOMFORT

## 2019-06-16 ASSESSMENT — PAIN DESCRIPTION - PAIN TYPE
TYPE: ACUTE PAIN;CHRONIC PAIN
TYPE: ACUTE PAIN
TYPE: ACUTE PAIN;CHRONIC PAIN
TYPE: ACUTE PAIN

## 2019-06-16 ASSESSMENT — PAIN SCALES - GENERAL
PAINLEVEL_OUTOF10: 10
PAINLEVEL_OUTOF10: 9
PAINLEVEL_OUTOF10: 8
PAINLEVEL_OUTOF10: 0
PAINLEVEL_OUTOF10: 4
PAINLEVEL_OUTOF10: 3
PAINLEVEL_OUTOF10: 7
PAINLEVEL_OUTOF10: 8
PAINLEVEL_OUTOF10: 10
PAINLEVEL_OUTOF10: 2
PAINLEVEL_OUTOF10: 4
PAINLEVEL_OUTOF10: 2
PAINLEVEL_OUTOF10: 7
PAINLEVEL_OUTOF10: 10
PAINLEVEL_OUTOF10: 0
PAINLEVEL_OUTOF10: 5
PAINLEVEL_OUTOF10: 7
PAINLEVEL_OUTOF10: 7

## 2019-06-16 ASSESSMENT — PAIN DESCRIPTION - FREQUENCY
FREQUENCY: CONTINUOUS

## 2019-06-16 ASSESSMENT — PAIN DESCRIPTION - ORIENTATION
ORIENTATION: LOWER

## 2019-06-16 ASSESSMENT — PAIN DESCRIPTION - PROGRESSION
CLINICAL_PROGRESSION: NOT CHANGED
CLINICAL_PROGRESSION: GRADUALLY WORSENING
CLINICAL_PROGRESSION: GRADUALLY WORSENING
CLINICAL_PROGRESSION: NOT CHANGED

## 2019-06-16 NOTE — PLAN OF CARE
Problem: Falls - Risk of:  Goal: Will remain free from falls  Description  Will remain free from falls  Outcome: Met This Shift  Goal: Absence of physical injury  Description  Absence of physical injury  Outcome: Met This Shift     Problem: Pain:  Goal: Pain level will decrease  Description  Pain level will decrease  6/16/2019 1132 by Colletta Rabon, RN  Outcome: Met This Shift  6/15/2019 2235 by Faustino Jolley RN  Outcome: Ongoing  Goal: Control of acute pain  Description  Control of acute pain  Outcome: Met This Shift  Goal: Control of chronic pain  Description  Control of chronic pain  Outcome: Met This Shift     Problem:  Activity:  Goal: Ability to tolerate increased activity will improve  Description  Ability to tolerate increased activity will improve  Outcome: Met This Shift  Goal: Expression of feelings of increased energy will increase  Description  Expression of feelings of increased energy will increase  Outcome: Met This Shift     Problem: Health Behavior:  Goal: Ability to manage health-related needs will improve  Description  Ability to manage health-related needs will improve  Outcome: Met This Shift     Problem: Safety:  Goal: Ability to remain free from injury will improve  Description  Ability to remain free from injury will improve  Outcome: Met This Shift  Goal: Will show no signs and symptoms of excessive bleeding  Description  Will show no signs and symptoms of excessive bleeding  Outcome: Met This Shift

## 2019-06-16 NOTE — PROGRESS NOTES
Subjective:    Awake and alert. Frustrated  Denies chest pain or dyspnea. Denies abdominal pain. Tolerating diet. No nausea or vomiting. Objective:    /66   Pulse 72   Temp 98.1 °F (36.7 °C) (Oral)   Resp 14   Ht 5' 2\" (1.575 m)   Wt 131 lb 9.6 oz (59.7 kg)   SpO2 97%   BMI 24.07 kg/m²   Skin: Warm and dry  Neck: Supple, no JVD  Heart:  RRR, no murmurs, gallops, or rubs. Lungs:  CTA bilaterally, no wheeze, rales or rhonchi  Abd: bowel sounds present, nontender, nondistended, no masses  Extrem:  No clubbing, cyanosis, or edema, pulses intact    I/O last 3 completed shifts: In: 5 [P.O.:420]  Out: -     MRI LUMBAR SPINE WO CONTRAST   Final Result      Less than 10% loss of vertical dimension of the vertebral body of L1   with compression deformity of superior endplate. Mild central and bilateral foraminal stenosis at L4-5. CT Lumbar Spine WO Contrast   Final Result   Findings compatible with degenerative changes   Compression of the superior endplate of L1, new from previous, with   approximately 10% loss of vertebral body height   The L5-S1 disc herniation is increased in the interval          Prior to Admission medications    Medication Sig Start Date End Date Taking? Authorizing Provider   Calcium Carb-Cholecalciferol (CALCIUM 600+D) 600-800 MG-UNIT TABS Take 1 tablet by mouth every morning   Yes Historical Provider, MD   latanoprost (XALATAN) 0.005 % ophthalmic solution Place 1 drop into both eyes nightly   Yes Historical Provider, MD   Multiple Vitamins-Minerals (PRESERVISION AREDS 2) CAPS Take 1 capsule by mouth every morning   Yes Historical Provider, MD   warfarin (COUMADIN) 3 MG tablet Take 3.5 mg by mouth Daily with supper    Yes Historical Provider, MD   vitamin D (CHOLECALCIFEROL) 1000 UNIT TABS tablet Take 1,000 Units by mouth every morning    Yes Historical Provider, MD   metoprolol (TOPROL-XL) 50 MG XL tablet Take 1 tablet by mouth daily.   Patient taking differently: Take 50 mg by mouth every morning  8/29/14  Yes Alix Sandhoff, MD   docusate sodium (COLACE) 100 MG capsule Take 100 mg by mouth 2 times daily    Yes Historical Provider, MD   simvastatin (ZOCOR) 40 MG tablet Take 40 mg by mouth nightly.      Yes Historical Provider, MD   alendronate (FOSAMAX) 70 MG tablet Take 70 mg by mouth every 7 days On Saturdays    Historical Provider, MD        Current Facility-Administered Medications   Medication Dose Route Frequency Provider Last Rate Last Dose    oxyCODONE-acetaminophen (PERCOCET) 5-325 MG per tablet 2 tablet  2 tablet Oral Q4H PRN Willam Slaughter MD   2 tablet at 06/16/19 1720    oxyCODONE-acetaminophen (PERCOCET) 5-325 MG per tablet 1 tablet  1 tablet Oral Q4H PRN Willam Slaughter MD   1 tablet at 06/16/19 0806    HYDROmorphone (DILAUDID) injection 0.5 mg  0.5 mg Intravenous Q3H PRN Willam Slaughter MD   0.5 mg at 06/16/19 1618    ondansetron (ZOFRAN) injection 4 mg  4 mg Intravenous Q6H PRN Becky Berumen PA-C   4 mg at 06/16/19 4719    docusate sodium (COLACE) capsule 100 mg  100 mg Oral BID [facility]   100 mg at 06/15/19 2054    metoprolol succinate (TOPROL XL) extended release tablet 50 mg  50 mg Oral QAM [facility], DO   50 mg at 06/16/19 5165    simvastatin (ZOCOR) tablet 40 mg  40 mg Oral Nightly [facility]   40 mg at 06/15/19 2054    vitamin D (CHOLECALCIFEROL) tablet 1,000 Units  1,000 Units Oral QAM [facility], DO   1,000 Units at 06/14/19 0076    sodium chloride flush 0.9 % injection 10 mL  10 mL Intravenous 2 times per day Lum [place], DO   10 mL at 06/16/19 7806    sodium chloride flush 0.9 % injection 10 mL  10 mL Intravenous PRN [facility]   10 mL at 06/16/19 1618    acetaminophen (TYLENOL) tablet 650 mg  650 mg Oral Q4H PRN [facility], [facility]        magnesium hydroxide (MILK OF MAGNESIA) 400 MG/5ML suspension 30 mL  30 mL Oral Daily PRN Lum DO Bethany               Problem list:    Active Problems:    Lumbar compression fracture, closed, initial encounter ([facility] [address]    Closed compression fracture of first lumbar vertebra (HCC)    Paroxysmal atrial fibrillation ([facility] [address]    Preoperative cardiovascular examination  Resolved Problems:    * No resolved hospital problems. *      Assessment:    1. Lumbar compression fracture     2. Atrial fibrillation     3. Hyperlipidemia     4.   Osteoporosis        Plan:    Discussed with Dr Mark Luna, Wally Isidro  7:10 PM  6/16/2019

## 2019-06-17 ENCOUNTER — APPOINTMENT (OUTPATIENT)
Dept: GENERAL RADIOLOGY | Age: 79
DRG: 517 | End: 2019-06-17
Payer: MEDICARE

## 2019-06-17 VITALS — OXYGEN SATURATION: 100 % | DIASTOLIC BLOOD PRESSURE: 63 MMHG | SYSTOLIC BLOOD PRESSURE: 140 MMHG

## 2019-06-17 PROCEDURE — 0QS03ZZ REPOSITION LUMBAR VERTEBRA, PERCUTANEOUS APPROACH: ICD-10-PCS | Performed by: ORTHOPAEDIC SURGERY

## 2019-06-17 PROCEDURE — 0PU43JZ SUPPLEMENT THORACIC VERTEBRA WITH SYNTHETIC SUBSTITUTE, PERCUTANEOUS APPROACH: ICD-10-PCS | Performed by: ORTHOPAEDIC SURGERY

## 2019-06-17 PROCEDURE — 7100000011 HC PHASE II RECOVERY - ADDTL 15 MIN: Performed by: ORTHOPAEDIC SURGERY

## 2019-06-17 PROCEDURE — 7100000010 HC PHASE II RECOVERY - FIRST 15 MIN: Performed by: ORTHOPAEDIC SURGERY

## 2019-06-17 PROCEDURE — 6370000000 HC RX 637 (ALT 250 FOR IP): Performed by: FAMILY MEDICINE

## 2019-06-17 PROCEDURE — 6360000002 HC RX W HCPCS: Performed by: FAMILY MEDICINE

## 2019-06-17 PROCEDURE — 1200000000 HC SEMI PRIVATE

## 2019-06-17 PROCEDURE — 6360000002 HC RX W HCPCS: Performed by: NURSE ANESTHETIST, CERTIFIED REGISTERED

## 2019-06-17 PROCEDURE — 3209999900 FLUORO FOR SURGICAL PROCEDURES

## 2019-06-17 PROCEDURE — 6360000002 HC RX W HCPCS: Performed by: ORTHOPAEDIC SURGERY

## 2019-06-17 PROCEDURE — C1894 INTRO/SHEATH, NON-LASER: HCPCS | Performed by: ORTHOPAEDIC SURGERY

## 2019-06-17 PROCEDURE — C1713 ANCHOR/SCREW BN/BN,TIS/BN: HCPCS | Performed by: ORTHOPAEDIC SURGERY

## 2019-06-17 PROCEDURE — 3600000012 HC SURGERY LEVEL 2 ADDTL 15MIN: Performed by: ORTHOPAEDIC SURGERY

## 2019-06-17 PROCEDURE — 2580000003 HC RX 258: Performed by: ORTHOPAEDIC SURGERY

## 2019-06-17 PROCEDURE — 99231 SBSQ HOSP IP/OBS SF/LOW 25: CPT | Performed by: INTERNAL MEDICINE

## 2019-06-17 PROCEDURE — 2709999900 HC NON-CHARGEABLE SUPPLY: Performed by: ORTHOPAEDIC SURGERY

## 2019-06-17 PROCEDURE — 6370000000 HC RX 637 (ALT 250 FOR IP): Performed by: STUDENT IN AN ORGANIZED HEALTH CARE EDUCATION/TRAINING PROGRAM

## 2019-06-17 PROCEDURE — 2500000003 HC RX 250 WO HCPCS: Performed by: ORTHOPAEDIC SURGERY

## 2019-06-17 PROCEDURE — 6370000000 HC RX 637 (ALT 250 FOR IP): Performed by: ORTHOPAEDIC SURGERY

## 2019-06-17 PROCEDURE — 3600000002 HC SURGERY LEVEL 2 BASE: Performed by: ORTHOPAEDIC SURGERY

## 2019-06-17 PROCEDURE — 3700000000 HC ANESTHESIA ATTENDED CARE: Performed by: ORTHOPAEDIC SURGERY

## 2019-06-17 PROCEDURE — 0PS43ZZ REPOSITION THORACIC VERTEBRA, PERCUTANEOUS APPROACH: ICD-10-PCS | Performed by: ORTHOPAEDIC SURGERY

## 2019-06-17 PROCEDURE — 2580000003 HC RX 258: Performed by: STUDENT IN AN ORGANIZED HEALTH CARE EDUCATION/TRAINING PROGRAM

## 2019-06-17 PROCEDURE — 2580000003 HC RX 258: Performed by: FAMILY MEDICINE

## 2019-06-17 PROCEDURE — 6360000002 HC RX W HCPCS

## 2019-06-17 PROCEDURE — 2580000003 HC RX 258: Performed by: NURSE ANESTHETIST, CERTIFIED REGISTERED

## 2019-06-17 PROCEDURE — 0QU03JZ SUPPLEMENT LUMBAR VERTEBRA WITH SYNTHETIC SUBSTITUTE, PERCUTANEOUS APPROACH: ICD-10-PCS | Performed by: ORTHOPAEDIC SURGERY

## 2019-06-17 PROCEDURE — 3700000001 HC ADD 15 MINUTES (ANESTHESIA): Performed by: ORTHOPAEDIC SURGERY

## 2019-06-17 PROCEDURE — 2500000003 HC RX 250 WO HCPCS: Performed by: NURSE ANESTHETIST, CERTIFIED REGISTERED

## 2019-06-17 DEVICE — BONE CEMENT C01B HV-R WITH MIXER  US
Type: IMPLANTABLE DEVICE | Status: FUNCTIONAL
Brand: KYPHON® HV-R® BONE CEMENT AND KYPHON® MIXER PACK

## 2019-06-17 RX ORDER — PROMETHAZINE HYDROCHLORIDE 25 MG/ML
25 INJECTION, SOLUTION INTRAMUSCULAR; INTRAVENOUS PRN
Status: DISCONTINUED | OUTPATIENT
Start: 2019-06-17 | End: 2019-06-17 | Stop reason: HOSPADM

## 2019-06-17 RX ORDER — OXYCODONE HYDROCHLORIDE AND ACETAMINOPHEN 5; 325 MG/1; MG/1
1 TABLET ORAL EVERY 6 HOURS PRN
Qty: 15 TABLET | Refills: 0 | Status: SHIPPED | OUTPATIENT
Start: 2019-06-17 | End: 2019-06-24

## 2019-06-17 RX ORDER — FENTANYL CITRATE 50 UG/ML
25 INJECTION, SOLUTION INTRAMUSCULAR; INTRAVENOUS EVERY 5 MIN PRN
Status: DISCONTINUED | OUTPATIENT
Start: 2019-06-17 | End: 2019-06-17 | Stop reason: HOSPADM

## 2019-06-17 RX ORDER — PROPOFOL 10 MG/ML
INJECTION, EMULSION INTRAVENOUS CONTINUOUS PRN
Status: DISCONTINUED | OUTPATIENT
Start: 2019-06-17 | End: 2019-06-17 | Stop reason: SDUPTHER

## 2019-06-17 RX ORDER — LABETALOL HYDROCHLORIDE 5 MG/ML
5 INJECTION, SOLUTION INTRAVENOUS EVERY 10 MIN PRN
Status: DISCONTINUED | OUTPATIENT
Start: 2019-06-17 | End: 2019-06-17 | Stop reason: HOSPADM

## 2019-06-17 RX ORDER — CEFAZOLIN SODIUM 1 G/3ML
1 INJECTION, POWDER, FOR SOLUTION INTRAMUSCULAR; INTRAVENOUS ONCE
Status: COMPLETED | OUTPATIENT
Start: 2019-06-17 | End: 2019-06-17

## 2019-06-17 RX ORDER — FENTANYL CITRATE 50 UG/ML
50 INJECTION, SOLUTION INTRAMUSCULAR; INTRAVENOUS EVERY 5 MIN PRN
Status: DISCONTINUED | OUTPATIENT
Start: 2019-06-17 | End: 2019-06-17 | Stop reason: HOSPADM

## 2019-06-17 RX ORDER — FENTANYL CITRATE 50 UG/ML
INJECTION, SOLUTION INTRAMUSCULAR; INTRAVENOUS PRN
Status: DISCONTINUED | OUTPATIENT
Start: 2019-06-17 | End: 2019-06-17 | Stop reason: SDUPTHER

## 2019-06-17 RX ORDER — FENTANYL CITRATE 50 UG/ML
50 INJECTION, SOLUTION INTRAMUSCULAR; INTRAVENOUS
Status: DISCONTINUED | OUTPATIENT
Start: 2019-06-17 | End: 2019-06-17 | Stop reason: HOSPADM

## 2019-06-17 RX ORDER — LIDOCAINE HYDROCHLORIDE AND EPINEPHRINE 10; 10 MG/ML; UG/ML
INJECTION, SOLUTION INFILTRATION; PERINEURAL PRN
Status: DISCONTINUED | OUTPATIENT
Start: 2019-06-17 | End: 2019-06-17 | Stop reason: ALTCHOICE

## 2019-06-17 RX ORDER — LIDOCAINE HYDROCHLORIDE 10 MG/ML
INJECTION, SOLUTION EPIDURAL; INFILTRATION; INTRACAUDAL; PERINEURAL PRN
Status: DISCONTINUED | OUTPATIENT
Start: 2019-06-17 | End: 2019-06-17 | Stop reason: SDUPTHER

## 2019-06-17 RX ORDER — SODIUM CHLORIDE, SODIUM LACTATE, POTASSIUM CHLORIDE, CALCIUM CHLORIDE 600; 310; 30; 20 MG/100ML; MG/100ML; MG/100ML; MG/100ML
INJECTION, SOLUTION INTRAVENOUS CONTINUOUS PRN
Status: DISCONTINUED | OUTPATIENT
Start: 2019-06-17 | End: 2019-06-17 | Stop reason: SDUPTHER

## 2019-06-17 RX ORDER — BUPIVACAINE HYDROCHLORIDE 5 MG/ML
INJECTION, SOLUTION EPIDURAL; INTRACAUDAL PRN
Status: DISCONTINUED | OUTPATIENT
Start: 2019-06-17 | End: 2019-06-17 | Stop reason: ALTCHOICE

## 2019-06-17 RX ORDER — FENTANYL CITRATE 50 UG/ML
INJECTION, SOLUTION INTRAMUSCULAR; INTRAVENOUS
Status: COMPLETED
Start: 2019-06-17 | End: 2019-06-17

## 2019-06-17 RX ORDER — SODIUM CHLORIDE 9 MG/ML
INJECTION, SOLUTION INTRAVENOUS CONTINUOUS
Status: DISCONTINUED | OUTPATIENT
Start: 2019-06-17 | End: 2019-06-18

## 2019-06-17 RX ORDER — MEPERIDINE HYDROCHLORIDE 25 MG/ML
12.5 INJECTION INTRAMUSCULAR; INTRAVENOUS; SUBCUTANEOUS EVERY 5 MIN PRN
Status: DISCONTINUED | OUTPATIENT
Start: 2019-06-17 | End: 2019-06-17 | Stop reason: HOSPADM

## 2019-06-17 RX ADMIN — FENTANYL CITRATE 50 MCG: 50 INJECTION INTRAMUSCULAR; INTRAVENOUS at 17:20

## 2019-06-17 RX ADMIN — FENTANYL CITRATE 50 MCG: 50 INJECTION, SOLUTION INTRAMUSCULAR; INTRAVENOUS at 16:19

## 2019-06-17 RX ADMIN — OXYCODONE HYDROCHLORIDE AND ACETAMINOPHEN 2 TABLET: 5; 325 TABLET ORAL at 08:10

## 2019-06-17 RX ADMIN — HYDROMORPHONE HYDROCHLORIDE 0.5 MG: 1 INJECTION, SOLUTION INTRAMUSCULAR; INTRAVENOUS; SUBCUTANEOUS at 06:16

## 2019-06-17 RX ADMIN — FENTANYL CITRATE 50 MCG: 50 INJECTION, SOLUTION INTRAMUSCULAR; INTRAVENOUS at 17:20

## 2019-06-17 RX ADMIN — OXYCODONE HYDROCHLORIDE AND ACETAMINOPHEN 2 TABLET: 5; 325 TABLET ORAL at 22:15

## 2019-06-17 RX ADMIN — LIDOCAINE HYDROCHLORIDE 20 MG: 10 INJECTION, SOLUTION EPIDURAL; INFILTRATION; INTRACAUDAL; PERINEURAL at 16:19

## 2019-06-17 RX ADMIN — SODIUM CHLORIDE: 9 INJECTION, SOLUTION INTRAVENOUS at 09:41

## 2019-06-17 RX ADMIN — PROPOFOL 100 MCG/KG/MIN: 10 INJECTION, EMULSION INTRAVENOUS at 16:19

## 2019-06-17 RX ADMIN — HYDROMORPHONE HYDROCHLORIDE 0.5 MG: 1 INJECTION, SOLUTION INTRAMUSCULAR; INTRAVENOUS; SUBCUTANEOUS at 03:14

## 2019-06-17 RX ADMIN — SIMVASTATIN 40 MG: 40 TABLET, FILM COATED ORAL at 22:20

## 2019-06-17 RX ADMIN — CEFAZOLIN 1000 MG: 1 INJECTION, POWDER, FOR SOLUTION INTRAMUSCULAR; INTRAVENOUS at 16:20

## 2019-06-17 RX ADMIN — HYDROMORPHONE HYDROCHLORIDE 1 MG: 1 INJECTION, SOLUTION INTRAMUSCULAR; INTRAVENOUS; SUBCUTANEOUS at 10:27

## 2019-06-17 RX ADMIN — DOCUSATE SODIUM 100 MG: 100 CAPSULE, LIQUID FILLED ORAL at 22:16

## 2019-06-17 RX ADMIN — SODIUM CHLORIDE, POTASSIUM CHLORIDE, SODIUM LACTATE AND CALCIUM CHLORIDE: 600; 310; 30; 20 INJECTION, SOLUTION INTRAVENOUS at 16:11

## 2019-06-17 RX ADMIN — HYDROMORPHONE HYDROCHLORIDE 1 MG: 1 INJECTION, SOLUTION INTRAMUSCULAR; INTRAVENOUS; SUBCUTANEOUS at 19:50

## 2019-06-17 RX ADMIN — Medication 10 ML: at 08:14

## 2019-06-17 RX ADMIN — METOPROLOL SUCCINATE 50 MG: 50 TABLET, EXTENDED RELEASE ORAL at 08:10

## 2019-06-17 RX ADMIN — HYDROMORPHONE HYDROCHLORIDE 1 MG: 1 INJECTION, SOLUTION INTRAMUSCULAR; INTRAVENOUS; SUBCUTANEOUS at 13:50

## 2019-06-17 RX ADMIN — SODIUM CHLORIDE: 9 INJECTION, SOLUTION INTRAVENOUS at 18:13

## 2019-06-17 ASSESSMENT — PAIN DESCRIPTION - PAIN TYPE
TYPE: SURGICAL PAIN
TYPE: ACUTE PAIN
TYPE: ACUTE PAIN

## 2019-06-17 ASSESSMENT — PAIN DESCRIPTION - FREQUENCY
FREQUENCY: CONTINUOUS
FREQUENCY: INTERMITTENT
FREQUENCY: CONTINUOUS
FREQUENCY: INTERMITTENT
FREQUENCY: CONTINUOUS

## 2019-06-17 ASSESSMENT — PULMONARY FUNCTION TESTS
PIF_VALUE: 1
PIF_VALUE: 0
PIF_VALUE: 1
PIF_VALUE: 0
PIF_VALUE: 1
PIF_VALUE: 0
PIF_VALUE: 1
PIF_VALUE: 0
PIF_VALUE: 1
PIF_VALUE: 0
PIF_VALUE: 1
PIF_VALUE: 0
PIF_VALUE: 1
PIF_VALUE: 0
PIF_VALUE: 1
PIF_VALUE: 0

## 2019-06-17 ASSESSMENT — PAIN DESCRIPTION - DESCRIPTORS
DESCRIPTORS: ACHING;DULL
DESCRIPTORS: ACHING;DISCOMFORT;SORE
DESCRIPTORS: ACHING;DISCOMFORT;DULL
DESCRIPTORS: ACHING;DISCOMFORT;PRESSURE
DESCRIPTORS: ACHING;DISCOMFORT
DESCRIPTORS: ACHING;DISCOMFORT;DULL

## 2019-06-17 ASSESSMENT — PAIN DESCRIPTION - PROGRESSION
CLINICAL_PROGRESSION: GRADUALLY WORSENING
CLINICAL_PROGRESSION: GRADUALLY WORSENING
CLINICAL_PROGRESSION: RAPIDLY IMPROVING
CLINICAL_PROGRESSION: NOT CHANGED
CLINICAL_PROGRESSION: NOT CHANGED
CLINICAL_PROGRESSION: RAPIDLY IMPROVING

## 2019-06-17 ASSESSMENT — PAIN - FUNCTIONAL ASSESSMENT
PAIN_FUNCTIONAL_ASSESSMENT: PREVENTS OR INTERFERES SOME ACTIVE ACTIVITIES AND ADLS
PAIN_FUNCTIONAL_ASSESSMENT: ACTIVITIES ARE NOT PREVENTED
PAIN_FUNCTIONAL_ASSESSMENT: 0-10
PAIN_FUNCTIONAL_ASSESSMENT: ACTIVITIES ARE NOT PREVENTED
PAIN_FUNCTIONAL_ASSESSMENT: PREVENTS OR INTERFERES SOME ACTIVE ACTIVITIES AND ADLS

## 2019-06-17 ASSESSMENT — PAIN DESCRIPTION - LOCATION
LOCATION: BACK

## 2019-06-17 ASSESSMENT — PAIN DESCRIPTION - ORIENTATION
ORIENTATION: LOWER

## 2019-06-17 ASSESSMENT — PAIN SCALES - GENERAL
PAINLEVEL_OUTOF10: 10
PAINLEVEL_OUTOF10: 7
PAINLEVEL_OUTOF10: 0
PAINLEVEL_OUTOF10: 7
PAINLEVEL_OUTOF10: 0
PAINLEVEL_OUTOF10: 8
PAINLEVEL_OUTOF10: 10
PAINLEVEL_OUTOF10: 7
PAINLEVEL_OUTOF10: 9
PAINLEVEL_OUTOF10: 0

## 2019-06-17 ASSESSMENT — PAIN DESCRIPTION - ONSET
ONSET: ON-GOING

## 2019-06-17 NOTE — CARE COORDINATION
Social Work/ Discharge planning :SW updated patient is scheduled today to have Kyphoplasty at 4:00 pm. Per SW note goal at discharge was home. SW to await therapy input to assist with meeting any needs at discharge. SW to follow.  . Electronically signed by Ernie Hashimoto, LSW on 6/17/19 at 10:47 AM

## 2019-06-17 NOTE — BRIEF OP NOTE
Brief Postoperative Note  ______________________________________________________________    Patient: Floyd De La Torre  YOB: 1940  MRN: [id number]  Date of Procedure: 6/17/2019    Pre-Op Diagnosis: Osteoporotic COMPRESSION FRACTURE L1. Impending osteoporotic compression fracture T12    Post-Op Diagnosis: Same       Procedure(s):  L1 KYPHOPLASTY AND THORACIC 12    Anesthesia: Monitor Anesthesia Care    Surgeon(s):  Saige Nolasco DO    Assistant: Elvira Byrd    Estimated Blood Loss (mL): less than 50     Complications: None    Specimens:   * No specimens in log *    Implants:  Implant Name Type Inv.  Item Serial No.  Lot No. LRB No. Used   KIT CEMENT BONE W/KYPHON MIXER TUBE FUNNL Cement KIT CEMENT BONE W/KYPHON MIXER TUBE [address] [id number] N/A 1   KIT CEMENT BONE W/KYPHON MIXER TUBE FUNNL Cement KIT CEMENT BONE W/KYPHON MIXER TUBE [address] [id number] N/A 1       Bushra Krueger DO  Date: 6/17/2019  Time: 5:06 PM

## 2019-06-17 NOTE — PROGRESS NOTES
Family Medicine    Subjective: The patient is doing well with the exception of continued pain. Otherwise feels well. For kyphoplasty today. Objective:  BP (!) 143/74   Pulse 73   Temp 98.2 °F (36.8 °C) (Oral)   Resp 14   Ht 5' 2\" (1.575 m)   Wt 131 lb 9.6 oz (59.7 kg)   SpO2 94%   BMI 24.07 kg/m²     HEENT: MMM. Heart:  RRR. Lungs:  CTA bilaterally. Abd: bowel sounds present, nontender, nondistended, no masses. Extrem:  No clubbing, cyanosis, or edema. Neuro: Intact without deficits. CBC with Differential:    Lab Results   Component Value Date    WBC 5.3 06/13/2019    RBC 4.26 06/13/2019    HGB 12.0 06/13/2019    HCT 36.4 06/13/2019     06/13/2019    MCV 85.4 06/13/2019    MCH 28.2 06/13/2019    MCHC 33.0 06/13/2019    RDW 13.8 06/13/2019    LYMPHOPCT 13.3 06/12/2019    MONOPCT 7.2 06/12/2019    BASOPCT 0.4 06/12/2019    MONOSABS 0.59 06/12/2019    LYMPHSABS 1.09 06/12/2019    EOSABS 0.02 06/12/2019    BASOSABS 0.03 06/12/2019     CMP:    Lab Results   Component Value Date     06/13/2019    K 4.2 06/13/2019    CL 99 06/13/2019    CO2 25 06/13/2019    BUN 14 06/13/2019    CREATININE 0.6 06/13/2019    GFRAA >60 06/13/2019    LABGLOM >60 06/13/2019    GLUCOSE 132 06/13/2019    GLUCOSE 95 01/11/2011    PROT 6.6 06/13/2019    LABALBU 3.9 06/13/2019    LABALBU 4.4 06/07/2011    CALCIUM 8.5 06/13/2019    BILITOT 0.5 06/13/2019    ALKPHOS 59 06/13/2019    AST 15 06/13/2019    ALT 10 06/13/2019     Warfarin PT/INR: 1.1    Assessment/Plan:  1. Compression fracture of L1 lumbar vertebra - Ortho on case. For kyphoplasty today. Cleared for surgery by cardiology - input appreciated. Continue Dilaudid for pain. 2. Atrial fibrillation - Currently NSR. Coumadin on hold for surgery. 3. Hyperlipidemia - statin. 4. Osteoporosis - Fosamax. 5. Disposition - As above. Continue same.     Mik Patelo  7:57 AM  6/17/2019

## 2019-06-17 NOTE — PROGRESS NOTES
[address] [facility] Nursing Transfer Note    Data:  Summary of patients progress: general anesthesia recovery    Reason for transfer: PACU discharge criteria met, transferred to next level of care. Action:  Explained reason for transfer to Patient/Family  Report given to: rn, using RN Handoff Navigator.   Mode of transportation: Cart    Response:  RN Recommendations:continuation of care and pain control

## 2019-06-17 NOTE — PROGRESS NOTES
INPATIENT CARDIOLOGY FOLLOW-UP    Name: Norlene Bumpers    Age: 66 y.o. Date of Admission: 6/12/2019 12:27 PM    Date of Service: 6/17/2019    Chief Complaint: Follow-up for perioperative cardiac risk assessment    Interim History:  No new overnight cardiac complaints. Currently with no complaints of CP, SOB, palpitations, dizziness, or lightheadedness. SR on telemetry. Review of Systems:   Cardiac: As per HPI  General: No fever, chills  Pulmonary: As per HPI  HEENT: No visual disturbances, difficult swallowing  GI: No nausea, vomiting  Endocrine: No thyroid disease or DM  Musculoskeletal: HORTA x 4, no focal motor deficits  Skin: Intact, no rashes  Neuro/Psych: No headache or seizures    Problem List:  Active Problems:    Lumbar compression fracture, closed, initial encounter ([facility]    Closed compression fracture of first lumbar vertebra (HCC)    Paroxysmal atrial fibrillation ([facility]    Preoperative cardiovascular examination  Resolved Problems:    * No resolved hospital problems. *      Allergies:   Allergies   Allergen Reactions    Bacitracin      Eye ointment       Current Medications:  Current Facility-Administered Medications   Medication Dose Route Frequency Provider Last Rate Last Dose    HYDROmorphone (DILAUDID) injection 1 mg  1 mg Intravenous Q3H PRN Luis Antonio Dorsey MD        oxyCODONE-acetaminophen (PERCOCET) 5-325 MG per tablet 2 tablet  2 tablet Oral Q4H PRN Honey Burns MD   2 tablet at 06/16/19 2144    oxyCODONE-acetaminophen (PERCOCET) 5-325 MG per tablet 1 tablet  1 tablet Oral Q4H PRN Honey Burns MD   1 tablet at 06/16/19 0806    ondansetron (ZOFRAN) injection 4 mg  4 mg Intravenous Q6H PRN Becky Berumen PA-C   4 mg at 06/16/19 2144    docusate sodium (COLACE) capsule 100 mg  100 mg Oral BID Divya Lace, DO   100 mg at 06/16/19 1951    metoprolol succinate (TOPROL XL) extended release tablet 50 mg  50 mg Oral QAM Divya Lace, DO   50 mg at 06/16/19 0807    simvastatin (ZOCOR) tablet 40 mg  40 mg Oral Nightly Fareed Brooms, DO   40 mg at 06/16/19 1951    vitamin D (CHOLECALCIFEROL) tablet 1,000 Units  1,000 Units Oral QAM Fareed Brooms, DO   1,000 Units at 06/14/19 5116    sodium chloride flush 0.9 % injection 10 mL  10 mL Intravenous 2 times per day Fareed Brooms, DO   10 mL at 06/16/19 1951    sodium chloride flush 0.9 % injection 10 mL  10 mL Intravenous PRN Fareed Brooms, DO   10 mL at 06/16/19 2346    acetaminophen (TYLENOL) tablet 650 mg  650 mg Oral Q4H PRN Fareed Brooms, DO        magnesium hydroxide (MILK OF MAGNESIA) 400 MG/5ML suspension 30 mL  30 mL Oral Daily PRN Fareed Brooms, DO             Physical Exam:  /66   Pulse 69   Temp 98.1 °F (36.7 °C) (Oral)   Resp 16   Ht 5' 2\" (1.575 m)   Wt 131 lb 9.6 oz (59.7 kg)   SpO2 94%   BMI 24.07 kg/m²   Wt Readings from Last 3 Encounters:   06/16/19 131 lb 9.6 oz (59.7 kg)   07/14/18 128 lb (58.1 kg)   09/17/14 121 lb (54.9 kg)     Appearance: Awake, alert, no acute respiratory distress  Skin: Intact, no rash  Head: Normocephalic, atraumatic  Eyes: EOMI, no conjunctival erythema  ENMT: No pharyngeal erythema, MMM, no rhinorrhea  Neck: Supple, no elevated JVP, no carotid bruits  Lungs: Clear to auscultation bilaterally. No wheezes, rales, or rhonchi. Cardiac: Regular rate and rhythm, +S1S2, no murmurs apparent  Abdomen: Soft, nontender, +bowel sounds  Extremities: Moves all extremities x 4, no lower extremity edema  Neurologic: No focal motor deficits apparent, normal mood and affect  Peripheral Pulses: Intact posterior tibial pulses bilaterally    Intake/Output:    Intake/Output Summary (Last 24 hours) at 6/17/2019 0750  Last data filed at 6/16/2019 1618  Gross per 24 hour   Intake 10 ml   Output --   Net 10 ml     No intake/output data recorded. Laboratory Tests:  No results for input(s): NA, K, CL, CO2, BUN, CREATININE, GLUCOSE, CALCIUM in the last 72 hours.   Lab Results   Component Value Date    MG 1.9 06/13/2019    BUN 21 02/10/2015    BUN 8 08/29/2014    CREATININE 0.6 06/13/2019    CREATININE 0.7 02/10/2015    CREATININE 0.6 08/29/2014    GLUCOSE 132 (H) 06/13/2019    GLUCOSE 92 02/10/2015    GLUCOSE 86 08/29/2014    CALCIUM 8.5 (L) 06/13/2019    CALCIUM 9.6 02/10/2015    CALCIUM 9.1 08/29/2014

## 2019-06-18 VITALS
HEIGHT: 62 IN | HEART RATE: 66 BPM | WEIGHT: 132.4 LBS | RESPIRATION RATE: 16 BRPM | SYSTOLIC BLOOD PRESSURE: 132 MMHG | DIASTOLIC BLOOD PRESSURE: 59 MMHG | TEMPERATURE: 98 F | BODY MASS INDEX: 24.37 KG/M2 | OXYGEN SATURATION: 96 %

## 2019-06-18 LAB
ALBUMIN SERPL-MCNC: 3.1 G/DL (ref 3.5–5.2)
ALP BLD-CCNC: 58 U/L (ref 35–104)
ALT SERPL-CCNC: 14 U/L (ref 0–32)
ANION GAP SERPL CALCULATED.3IONS-SCNC: 11 MMOL/L (ref 7–16)
AST SERPL-CCNC: 25 U/L (ref 0–31)
BILIRUB SERPL-MCNC: 0.5 MG/DL (ref 0–1.2)
BUN BLDV-MCNC: 12 MG/DL (ref 8–23)
CALCIUM SERPL-MCNC: 8.1 MG/DL (ref 8.6–10.2)
CHLORIDE BLD-SCNC: 100 MMOL/L (ref 98–107)
CO2: 23 MMOL/L (ref 22–29)
CREAT SERPL-MCNC: 0.5 MG/DL (ref 0.5–1)
GFR AFRICAN AMERICAN: >60
GFR NON-AFRICAN AMERICAN: >60 ML/MIN/1.73
GLUCOSE BLD-MCNC: 83 MG/DL (ref 74–99)
HCT VFR BLD CALC: 34.7 % (ref 34–48)
HEMOGLOBIN: 11.4 G/DL (ref 11.5–15.5)
INR BLD: 1.1
MCH RBC QN AUTO: 28.4 PG (ref 26–35)
MCHC RBC AUTO-ENTMCNC: 32.9 % (ref 32–34.5)
MCV RBC AUTO: 86.3 FL (ref 80–99.9)
PDW BLD-RTO: 13.4 FL (ref 11.5–15)
PLATELET # BLD: 172 E9/L (ref 130–450)
PMV BLD AUTO: 8.7 FL (ref 7–12)
POTASSIUM SERPL-SCNC: 3.8 MMOL/L (ref 3.5–5)
PROTHROMBIN TIME: 12.8 SEC (ref 9.3–12.4)
RBC # BLD: 4.02 E12/L (ref 3.5–5.5)
SODIUM BLD-SCNC: 134 MMOL/L (ref 132–146)
TOTAL PROTEIN: 5.6 G/DL (ref 6.4–8.3)
WBC # BLD: 6.3 E9/L (ref 4.5–11.5)

## 2019-06-18 PROCEDURE — 97535 SELF CARE MNGMENT TRAINING: CPT

## 2019-06-18 PROCEDURE — 80053 COMPREHEN METABOLIC PANEL: CPT

## 2019-06-18 PROCEDURE — 85027 COMPLETE CBC AUTOMATED: CPT

## 2019-06-18 PROCEDURE — 97530 THERAPEUTIC ACTIVITIES: CPT

## 2019-06-18 PROCEDURE — 36415 COLL VENOUS BLD VENIPUNCTURE: CPT

## 2019-06-18 PROCEDURE — 97165 OT EVAL LOW COMPLEX 30 MIN: CPT

## 2019-06-18 PROCEDURE — 6370000000 HC RX 637 (ALT 250 FOR IP): Performed by: ORTHOPAEDIC SURGERY

## 2019-06-18 PROCEDURE — 97161 PT EVAL LOW COMPLEX 20 MIN: CPT

## 2019-06-18 PROCEDURE — 6370000000 HC RX 637 (ALT 250 FOR IP): Performed by: FAMILY MEDICINE

## 2019-06-18 PROCEDURE — 85610 PROTHROMBIN TIME: CPT

## 2019-06-18 PROCEDURE — 2580000003 HC RX 258: Performed by: ORTHOPAEDIC SURGERY

## 2019-06-18 RX ORDER — OXYCODONE HYDROCHLORIDE AND ACETAMINOPHEN 5; 325 MG/1; MG/1
1 TABLET ORAL EVERY 6 HOURS PRN
Qty: 12 TABLET | Refills: 0 | Status: SHIPPED | OUTPATIENT
Start: 2019-06-18 | End: 2019-06-21

## 2019-06-18 RX ADMIN — WARFARIN SODIUM 3.5 MG: 2.5 TABLET ORAL at 18:06

## 2019-06-18 RX ADMIN — OXYCODONE HYDROCHLORIDE AND ACETAMINOPHEN 2 TABLET: 5; 325 TABLET ORAL at 12:01

## 2019-06-18 RX ADMIN — OXYCODONE HYDROCHLORIDE AND ACETAMINOPHEN 2 TABLET: 5; 325 TABLET ORAL at 16:13

## 2019-06-18 RX ADMIN — Medication 10 ML: at 10:39

## 2019-06-18 RX ADMIN — METOPROLOL SUCCINATE 50 MG: 50 TABLET, EXTENDED RELEASE ORAL at 10:38

## 2019-06-18 RX ADMIN — OXYCODONE HYDROCHLORIDE AND ACETAMINOPHEN 2 TABLET: 5; 325 TABLET ORAL at 03:23

## 2019-06-18 RX ADMIN — VITAMIN D, TAB 1000IU (100/BT) 1000 UNITS: 25 TAB at 10:38

## 2019-06-18 RX ADMIN — DOCUSATE SODIUM 100 MG: 100 CAPSULE, LIQUID FILLED ORAL at 10:38

## 2019-06-18 ASSESSMENT — PAIN DESCRIPTION - PAIN TYPE
TYPE: SURGICAL PAIN

## 2019-06-18 ASSESSMENT — PAIN DESCRIPTION - ONSET
ONSET: ON-GOING

## 2019-06-18 ASSESSMENT — PAIN DESCRIPTION - LOCATION
LOCATION: BACK

## 2019-06-18 ASSESSMENT — PAIN - FUNCTIONAL ASSESSMENT
PAIN_FUNCTIONAL_ASSESSMENT: PREVENTS OR INTERFERES SOME ACTIVE ACTIVITIES AND ADLS
PAIN_FUNCTIONAL_ASSESSMENT: PREVENTS OR INTERFERES SOME ACTIVE ACTIVITIES AND ADLS
PAIN_FUNCTIONAL_ASSESSMENT: ACTIVITIES ARE NOT PREVENTED

## 2019-06-18 ASSESSMENT — PAIN DESCRIPTION - FREQUENCY
FREQUENCY: INTERMITTENT
FREQUENCY: CONTINUOUS
FREQUENCY: INTERMITTENT

## 2019-06-18 ASSESSMENT — PAIN DESCRIPTION - ORIENTATION
ORIENTATION: LOWER

## 2019-06-18 ASSESSMENT — PAIN SCALES - GENERAL
PAINLEVEL_OUTOF10: 8
PAINLEVEL_OUTOF10: 0
PAINLEVEL_OUTOF10: 7
PAINLEVEL_OUTOF10: 0
PAINLEVEL_OUTOF10: 7

## 2019-06-18 ASSESSMENT — PAIN DESCRIPTION - PROGRESSION
CLINICAL_PROGRESSION: GRADUALLY WORSENING
CLINICAL_PROGRESSION: RAPIDLY IMPROVING

## 2019-06-18 ASSESSMENT — PAIN DESCRIPTION - DESCRIPTORS
DESCRIPTORS: ACHING;CONSTANT;DISCOMFORT
DESCRIPTORS: ACHING;DISCOMFORT;TENDER
DESCRIPTORS: DISCOMFORT;SHARP;SORE

## 2019-06-18 NOTE — CARE COORDINATION
Social Work/Discharge Planning:  Received notification of patient request for hhc and a wheeled walker. Referral made to liaphyllis Ca from [address] Referral made to [facility] from [facility] for a wheeled walker.    Electronically signed by KEDAR Dai on 6/18/2019 at 2:39 PM

## 2019-06-18 NOTE — OP NOTE
[address] [address]                                OPERATIVE REPORT    PATIENT NAME: Linda Ramsay                       :        1940  MED REC NO:   [id number]                            ROOM:       5660  ACCOUNT NO:   [de-identified]                           ADMIT DATE: 2019  PROVIDER:     Santosh Fountain DO    DATE OF PROCEDURE:  2019    AGE:  66-year-old female. PREOPERATIVE DIAGNOSES:  1. Compression fracture at L1 and an impending fracture, T12.  2.  Severe osteoporosis. 3.  Severe back pain. POSTOPERATIVE DIAGNOSES:  1. Compression fracture at L1 and an impending fracture, T12.  2.  Severe osteoporosis. 3.  Severe back pain. OPERATION PROCEDURE:  1. Kyphoplasty, T12 and L1.  2.  C-arm. SURGEON:  Santosh Fountain DO.    FIRST ASSISTANT:  Raysa Ortega DO.    SPONGE COUNT AND NEEDLE COUNT:  Correct. ANESTHESIA:  LMAC with sedation. COMPLICATIONS:  None. ESTIMATED BLOOD LOSS:  Minimal.    DISPOSITION:  Stable condition. DESCRIPTION OF PROCEDURE:  After thorough consent was obtained from the  patient, the patient understanding the risks of the procedure, the  patient was brought to the operating room, and underwent successful  sedation without difficulty. She was then carefully rolled to a prone  position on the operating table. All bony prominences and vital  structures were well padded. Head was positioned in a cradle tong to  avoid any pressure upon the globes of the eyes. Mid and low back were  then sterilely prepped with ChloraPrep solution and sterilely draped in  the normal fashion. C-arm control was then brought in to confirm  appropriate level for the skin incision. Marking pen was used to madhav over the pedicles at T12.  1% lidocaine and  0.5% Marcaine were used to anesthetize the skin down to the level of the  deep structures.   A 15 blade was made to make a skin incision. Working  trocar and working cannula were advanced under direct C-arm control  through the pedicle into the anterior vertebral body of T12. Working  trocar was then withdrawn. Bone biopsy trocar was then placed into  position just to make a pathway. No biopsy was sent on this individual.  Inflated balloon tamp was then placed through the working cannula into  the anterior vertebral body of T12. Gentle insufflation was then  performed at this time to reduce the fracture site. Cement was then  mixed per the manufacture's specification. Inflated balloon tamp was  then deflated and then withdrawn. Cement introducers were then placed  into position. Cement was then injected under direct C-arm control  demonstrating satisfactory interdigitation with reduction of fracture. Once the cement was then hardened, the working cannula and cemented  introducers were then withdrawn. Marking pen was used to madhav over the pedicles at L1.  1% lidocaine and  0.5% Marcaine were used to anesthetize the skin down to the level of the  deep structures. A 15 blade was made to make a skin incision. Working  trocar and working cannula were advanced under direct C-arm control  through the pedicle into the anterior vertebral body of L1. Working  trocar was then withdrawn. Bone biopsy trocar was then placed into  position just to make a pathway. No biopsy was sent on this individual.  Inflated balloon tamp was then placed through the working cannula into  the anterior vertebral body of L1. Gentle insufflation was then  performed at this time to reduce the fracture site. Cement was then  mixed per the manufacture's specification. Inflated balloon tamp was  then deflated and then withdrawn. Cement introducers were then placed  into position. Cement was then injected under direct C-arm control  demonstrating satisfactory interdigitation with reduction of fracture.    Once the cement was then hardened, the working cannula and cemented  introducers were then withdrawn. After both kyphoplasty completed at T12 and L1, the wounds were then  cleansed and dried. Tincture was applied. Steri-Strips were applied. Sterile dressing was then applied. The patient was then carefully  rolled to the supine position on the hospital bed and was transferred to  PACU per Anesthesia in stable condition. GROSS HISTORY:  The patient is a 70-year-old female, well known to my  service, has severe osteoporosis with significant deviation from the  standard with osteoporotic change appreciated, prior kyphoplasties, and  spontaneous fractures in her back from the severe osteoporosis. She  currently is on medication for this, but has not _____ significant  amount of bone mass at this time. She will continue prophylactic  treatment. She underwent the above operative procedure with the risks  and benefits discussed at length. There were no guarantees given. Consent forms were signed.         Med Jefferson DO    D: 06/17/2019 17:15:54       T: 06/18/2019 1:43:19     [email or web address]  Job#: [id number]     Doc#: [id number]    CC:

## 2019-06-18 NOTE — PROGRESS NOTES
Occupational Therapy  OCCUPATIONAL THERAPY INITIAL EVALUATION      Date:2019  Patient Name: Vlad Hunter  MRN: [id number]  : 1940  Room: [address] [place] [address]    Evaluating OT: Berkley Wallace OTR/L [id number]    AM-PAC Daily Activity Raw Score:     Recommended Adaptive Equipment: TBD    Diagnosis: lumbar compression fracture. Surgery:  Kypho T12, L1   Pertinent Medical History: afib, osteoporosis   Precautions:  Falls, neutral spine precautions     Home Living: Pt lives alone in a single story condo with 1 step on entry. Bathroom setup: walk in shower with seat     Prior Level of Function: Independent with ADLs, Independent with IADLs; completed functional mobility no AD    Pain Level: back pain with movement. Pt reports she knows she can request pain medication at any time but prefers to hold off right now    Cognition: A&O: . Problem solving:  WFL   Judgement/safety:  WFL     Functional Assessment:   Initial Eval Status  Date: 19 Treatment session:  Short Term Goals  Treatment frequency: 2-5x/wk PRN x1-3 wks   Feeding Independent     Grooming SBA  Standing sink level  Mod I   UB Dressing Set up  Mod I   LB Dressing Min A  Able to maintain back precautions seated for threading B feet through brief with min to mod cues for technique  Mod I    Bathing Min A  Sponge bathing standing sink level  Mod I   Toileting SBA  Mod I   Bed Mobility  Sit to Supine: SBA  Education on log rolling     Functional Transfers STS: SBA  Mod I   Functional Mobility SBA with [employer]  Hallway distance  Slow pace  Mod I during ADLs   Balance Sitting: fair    Standing: fair at [employer]     Activity Tolerance fair  standing dionte x6-7 min with fair plus balance during self care tasks           ADL comments: Pt educated on back safety precautions during self care tasks with verbalized understanding but cues throughout session to follow through.  OT educates patient on environmental modifications to maintain back precautions in kitchen environment with verbalized understanding. Strength ROM Additional Info:    RUE  4-/5 WFL good  and FMC/dexterity noted during ADL tasks     LUE 4-/5 WFL good  and FMC/dexterity noted during ADL tasks         Hearing: WFL   Vision: WFL  Sensation:  No c/o numbness or tingling   Tone: WFL   Edema: none                             Comments/Treatment: Patient educated on adapted techniques for completion of ADL, safe functional transfers and mobility. Patient required cues for follow through with proper hand/foot placement, pacing, safety, precautions and technique in bed mobility, functional transfers, functional mobility, LB dressing, sponge bathing standing sink level, and grooming in preparation for maximum independence in all self care tasks. Eval Complexity: Low    Assessment of current deficits   Functional mobility [x]  ADLs [x] Strength [x]  Cognition []  Functional transfers  [x] IADLs [x] Safety Awareness [x]  Endurance [x]  Fine Motor Coordination [] Balance [x] Vision/perception [] Sensation []   Gross Motor Coordination [] ROM [] Delirium []                  Motor Control []    Plan of Care:   ADL retraining [x]   Equipment needs [x]   Neuromuscular re-education [x] Energy Conservation Techniques [x]  Functional Transfer training [x] Patient and/or Family Education [x]  Functional Mobility training [x]  Environmental Modifications [x]  Cognitive re-training []   Compensatory techniques for ADLs [x]  Splinting Needs []   Positioning to improve overall function [x]   Therapeutic Activity [x]  Therapeutic Exercise  [x]  Visual/Perceptual: []    Delirium prevention/treatment  []   Other:  []    Rehab Potential: Good for established goals     Patient / Family Goal: To get home. Patient and/or family were instructed on functional diagnosis, prognosis/goals and OT plan of care. Pt verbalized good understanding. Upon arrival, patient standing in bathroom.  At end of session, patient supine in bed with call light and phone within reach, all lines and tubes intact. Pt would benefit from continued skilled OT to increase safety and independence with completion of ADL/IADL tasks for functional independence and quality of life.     Low Evaluation + 10 timed treatment minutes  Tx Time in: 1000  Tx Time out: 1010    Evaluation time includes thorough review of current medical information, gathering information on past medical history/social history and prior level of function, completion of standardized testing/informal observation of tasks, assessment of data, and development of POC/Goals    Reyes Loomis OTR/L  [id number]

## 2019-06-18 NOTE — PROGRESS NOTES
Called Dr. Aguilar Ramos per RN BZ  Reason can patient resume coumadin ?   Brian Sierra  6/18/2019  8:28 AM

## 2019-06-18 NOTE — PLAN OF CARE
symptoms of excessive bleeding  Outcome: Met This Shift     Problem: Infection - Surgical Site:  Goal: Will show no infection signs and symptoms  Description  Will show no infection signs and symptoms  Outcome: Met This Shift

## 2019-06-18 NOTE — CARE COORDINATION
Met w/ patient and son. Informed of PT- AM-PAC 18 and recommendation for HHC. [facility] list provided to review. Chose [facility]. Requesting [employer]. Sofiya Avendano  notified of choice Jem Cevallos  .

## 2019-06-18 NOTE — PROGRESS NOTES
S: Pt seen and examined. Doing well. C/o mild back soreness but overall feels better.   O: BLE: motor strength and sensation intact, calves soft, distal pulses palpable  A: s/p kyphoplasty T12, L1  P: d/w Dr. Alise Larios for d/c from ortho standpoint      F/u in 2 weeks       PT eval

## 2019-06-18 NOTE — PROGRESS NOTES
Called Dr. Jordan Mendes per RN BZ  Reason OK to discharge? Need Pain Script.   Sharla Belle  6/18/2019  3:59 PM

## 2019-06-18 NOTE — PROGRESS NOTES
Called Dr. Yadira Montero per RN BZ  Reason can Coumadin be resumed ?   Mick Parks  6/18/2019  9:23 AM

## 2019-06-18 NOTE — PROGRESS NOTES
Physical Therapy    Facility/Department: [facility] SURGERY  Initial Assessment    NAME: Deidre Beck  : 1940  MRN: [id number]    Date of Service: 2019        REQUIRES PT FOLLOW UP: Yes       Patient Diagnosis(es): The encounter diagnosis was Closed compression fracture of first lumbar vertebra, initial encounter ([facility] has a past medical history of Atrial fibrillation ([facility] Hyperlipidemia, and Osteoporosis. has a past surgical history that includes Hysterectomy; Appendectomy; Fixation Kyphoplasty; Fixation Kyphoplasty (13); and Kyphosis surgery (N/A, 2019). Evaluating Therapist: Morris Davies PT         Room #: 982  DIAGNOSIS: compression fx s/p kypho T12, L 1     PRECAUTIONS: falls, spinal     Social:  Pt lives alone  in a  1  floor condo, 1 step  to enter. Prior to admission pt walked with  No AD. Has rollator      Initial Evaluation  Date: 19 Treatment      Short Term/ Long Term   Goals   Was pt agreeable to Eval/treatment? Yes      Does pt have pain?  back pain with gait      Bed Mobility  Rolling:  SBA   Supine to sit:  SBA   Sit to supine:  NT   Scooting:  SBA    S/I    Transfers Sit to stand:  SBA   Stand to sit:  SBA   Stand pivot:  SBA    S/I    Ambulation     120  feet with ww  with SBA   150  feet with  ww  with S/I       Stair negotiation: ascended and descended NT    4  steps with  1 rail with  SBA. 1 platform step with ww SBA    LE ROM  WFL      LE strength  4-/ 5      AM- PAC RAW score               Pt is alert and Oriented      Balance: SBA   Edema: B LEs   Endurance:  decreased by pain      ASSESSMENT  Pt displays functional ability as noted in the objective portion of this evaluation. Comments/Treatment:  Very slow luis alfredo with gait.  Pt RB after mobility per her request        Examination of body systems Decreased   Functional mobility x   ROM    Strength x   Safety Awareness    Cognition    Endurance x   Sensation    Balance x Vision/Visual Deficits    Coordination        Patient education  Pt educated on  Fall risk, ww use, spinal neutrality /log rolling     Patient response to education:   Pt verbalized understanding Pt demonstrated skill Pt requires further education in this area   x  with cues   x     Rehab potential is Good for reaching above PT goals. Pts/ family goals   1. Decreased pain    Patient and or family understand(s) diagnosis, prognosis, and plan of care. -  Yes     PLAN  PT care will be provided in accordance with the objectives noted above. Whenever appropriate, clear delegation orders will be provided for nursing staff. Exercises and functional mobility practice will be used as well as appropriate assistive devices or modalities to obtain goals. Patient and family education will also be administered as needed. Frequency of treatments will be daily x 3 days.       Time out: [address]  number:  PT [id number]

## 2019-06-18 NOTE — PROGRESS NOTES
[facility] Quality Flow/Interdisciplinary Rounds Progress Note        Quality Flow Rounds held on June 18, 2019    Disciplines Attending:  Bedside Nurse, ,  and Nursing Unit Leadership    Christy Costa was admitted on 6/12/2019 12:27 PM    Anticipated Discharge Date:  Expected Discharge Date: 06/18/19    Disposition:    Leobardo Score:  Leobardo Scale Score: 21    Readmission Risk              Risk of Unplanned Readmission:        12           Discussed patient goal for the day, patient clinical progression, and barriers to discharge.   The following Goal(s) of the Day/Commitment(s) have been identified:  Discharge Planning      Mendel Boone  June 18, 2019

## 2019-06-18 NOTE — PROGRESS NOTES
Family Medicine    Subjective: The patient is doing well with the exception of continued but improved pain. Otherwise feels well. S/p kyphoplasty at T12 and L1. Objective:  BP (!) 116/57   Pulse 67   Temp 99.1 °F (37.3 °C) (Oral)   Resp 16   Ht 5' 2\" (1.575 m)   Wt 132 lb 6.4 oz (60.1 kg)   SpO2 92%   BMI 24.22 kg/m²     HEENT: MMM. Heart:  RRR. Lungs:  CTA bilaterally. Abd: bowel sounds present, nontender, nondistended, no masses. Extrem:  No clubbing, cyanosis, or edema. Neuro: Intact without deficits. CBC with Differential:    Lab Results   Component Value Date    WBC 6.3 06/18/2019    RBC 4.02 06/18/2019    HGB 11.4 06/18/2019    HCT 34.7 06/18/2019     06/18/2019    MCV 86.3 06/18/2019    MCH 28.4 06/18/2019    MCHC 32.9 06/18/2019    RDW 13.4 06/18/2019    LYMPHOPCT 13.3 06/12/2019    MONOPCT 7.2 06/12/2019    BASOPCT 0.4 06/12/2019    MONOSABS 0.59 06/12/2019    LYMPHSABS 1.09 06/12/2019    EOSABS 0.02 06/12/2019    BASOSABS 0.03 06/12/2019     CMP:    Lab Results   Component Value Date     06/18/2019    K 3.8 06/18/2019    K 4.2 06/13/2019     06/18/2019    CO2 23 06/18/2019    BUN 12 06/18/2019    CREATININE 0.5 06/18/2019    GFRAA >60 06/18/2019    LABGLOM >60 06/18/2019    GLUCOSE 83 06/18/2019    GLUCOSE 95 01/11/2011    PROT 5.6 06/18/2019    LABALBU 3.1 06/18/2019    LABALBU 4.4 06/07/2011    CALCIUM 8.1 06/18/2019    BILITOT 0.5 06/18/2019    ALKPHOS 58 06/18/2019    AST 25 06/18/2019    ALT 14 06/18/2019     Warfarin PT/INR: 1.1    Assessment/Plan:  1. Compression fracture of L1 lumbar vertebra - Ortho on case. S/p kyphoplasty at T12 and L1.   2. Atrial fibrillation - Currently NSR. Coumadin was on hold for surgery. Restart today. 3. Hyperlipidemia - statin. 4. Osteoporosis - Fosamax. Will refer to endocrinology as outpatient to consider more aggressive treatment. 5. Disposition - As above. Continue same. Home soon.     Meli April  7:43 AM  6/18/2019

## 2019-06-18 NOTE — ANESTHESIA POSTPROCEDURE EVALUATION
Department of Anesthesiology  Postprocedure Note    Patient: Gabby Dent  MRN: [id number]  YOB: 1940  Date of evaluation: 6/17/2019  Time:  8:26 PM     Procedure Summary     Date:  06/17/19 Room / Location:  [facility] OR  / [facility] OR    Anesthesia Start:  1611 Anesthesia Stop:  1708    Procedures:       L1 KYPHOPLASTY AND THORACIC 12 (N/A )      KYPHOPLASTY L1 (canceled) Diagnosis:  (COMPRESSION FRACTURE)    Surgeon:  Mary Hong DO Responsible Provider:  Gerald Patel MD    Anesthesia Type:  MAC ASA Status:  3          Anesthesia Type: MAC    Reuben Phase I: Reuben Score: 10    Reuben Phase II: Reuben Score: 10    Last vitals: Reviewed and per EMR flowsheets.        Anesthesia Post Evaluation    Patient location during evaluation: PACU  Patient participation: complete - patient participated  Level of consciousness: awake  Airway patency: patent  Nausea & Vomiting: no nausea and no vomiting  Complications: no  Cardiovascular status: hemodynamically stable  Respiratory status: acceptable  Hydration status: stable

## 2019-06-20 NOTE — DISCHARGE SUMMARY
[address] [address]                               DISCHARGE SUMMARY    PATIENT NAME: Catalina Perera                       :        1940  MED REC NO:   [id number]                            ROOM:       5470  ACCOUNT NO:   [de-identified]                           ADMIT DATE: 2019  PROVIDER:     Eugenia Matos                  [facility] DATE: 2019      ADMISSION DIAGNOSIS:  Intractable low back pain. DISCHARGE DIAGNOSIS:  Intractable low back pain with acute L1  compression fracture. PROCEDURE:  Kyphoplasty, T12 and L1.    HOSPITAL COURSE:  The patient tolerated the procedure well and was  admitted for postop management. Pain was controlled with appropriate  pain meds. We did obtain physical and occupational therapy evaluation. CONDITION UPON DISCHARGE:  Stable. DISPOSITION:  The patient was discharged home with home health care on  2019. She will progress with her activities while being careful  with bending and lifting movements. PLAN:  The patient will follow up with Dr. Ifeoma Millan in two weeks.         Nida Devi    D: 2019 10:15:09       T: 2019 10:47:47     [email or web address]  Job#: [id number]     Doc#: [id number]    CC:

## 2019-06-25 ENCOUNTER — TELEPHONE (OUTPATIENT)
Dept: ADMINISTRATIVE | Age: 79
End: 2019-06-25

## 2019-07-31 ENCOUNTER — OFFICE VISIT (OUTPATIENT)
Dept: ENDOCRINOLOGY | Age: 79
End: 2019-07-31
Payer: MEDICARE

## 2019-07-31 VITALS
SYSTOLIC BLOOD PRESSURE: 126 MMHG | HEART RATE: 82 BPM | HEIGHT: 62 IN | RESPIRATION RATE: 16 BRPM | OXYGEN SATURATION: 97 % | BODY MASS INDEX: 23.52 KG/M2 | WEIGHT: 127.8 LBS | DIASTOLIC BLOOD PRESSURE: 78 MMHG

## 2019-07-31 DIAGNOSIS — M81.0 OSTEOPOROSIS, UNSPECIFIED OSTEOPOROSIS TYPE, UNSPECIFIED PATHOLOGICAL FRACTURE PRESENCE: Primary | ICD-10-CM

## 2019-07-31 DIAGNOSIS — M81.0 OSTEOPOROSIS, UNSPECIFIED OSTEOPOROSIS TYPE, UNSPECIFIED PATHOLOGICAL FRACTURE PRESENCE: ICD-10-CM

## 2019-07-31 PROCEDURE — 99205 OFFICE O/P NEW HI 60 MIN: CPT | Performed by: INTERNAL MEDICINE

## 2019-07-31 NOTE — PROGRESS NOTES
[address] [place] [address] Department of Endocrinology Diabetes and Metabolism   [address] [facility] [address]   Phone: [phone]  Fax: [phone]    Date of Service: 7/31/2019    Primary Care Physician: Carol Hollins MD.  Referring physician: Casimiro Tapia   Provider: Ruthie Rodriguez MD        Reason for the visit:      History of present illness: The history is provided by the patient. No  was used. Accuracy of the patient data is excellent  Kim Delgado is a very pleasant 66 y.o. female with history of significant osteoporosis and recurrent compression fractures with multiple kyphoplasty surgeries (most recent Kyphoplasty T12-L1 in 6/2019) seen in Endocrine clinic today for management of osteoporosis     The patient was diagnosed with osteoporosis many years ago. She was treated with Fosamax and Relact for many years but unfortunately her bone density continue to decline while on Bipshosphonate   Currently on Fosamax 70 mg weekly   On Ca 600mg/800u + Vit D 1000 iu supplementation  Age of menopause early 46s  The patient diet does include dairy products, including milk and cheese     DEXA scan 6/30/2016  LS T score of -3.4  Left hip has a T score of -3.3. The right hip has a T score of -3.4    Most recent DEXA SCAN 8/7/2018  LS T score - 4.3  Fem Neck T score - 3.3     No recent falls, lives is a condo no stairs     PAST MEDICAL HISTORY   Past Medical History:   Diagnosis Date    Atrial fibrillation ([address]     Hyperlipidemia     Osteoporosis      PAST SURGICAL HISTORY   Past Surgical History:   Procedure Laterality Date    APPENDECTOMY      FIXATION KYPHOPLASTY      FIXATION KYPHOPLASTY  1/5/13    t 5    HYSTERECTOMY      KYPHOSIS SURGERY N/A 6/17/2019    L1 KYPHOPLASTY AND THORACIC 12 performed by Sigifredo Valentin DO at [address] [facility] [address] History     Socioeconomic History    Marital status:       Spouse name: Not on file    Number of tenderness, no organomegaly, audible bowel sounds. MSK: + back deformity  Skin: warm, no lesions, no rash.  No striae no Bruises   Neuro: CN intact, sensation notmal , muscle power normal  Psych: normal mood, and affect     Lab review   I personally reviewed the following labs:   Lab Results   Component Value Date/Time    WBC 6.3 06/18/2019 05:10 AM    RBC 4.02 06/18/2019 05:10 AM    HGB 11.4 (L) 06/18/2019 05:10 AM    HCT 34.7 06/18/2019 05:10 AM    MCV 86.3 06/18/2019 05:10 AM    MCH 28.4 06/18/2019 05:10 AM    MCHC 32.9 06/18/2019 05:10 AM    RDW 13.4 06/18/2019 05:10 AM     06/18/2019 05:10 AM    MPV 8.7 06/18/2019 05:10 AM      Lab Results   Component Value Date/Time     06/18/2019 04:35 AM    K 3.8 06/18/2019 04:35 AM    K 4.2 06/13/2019 06:25 AM    CO2 23 06/18/2019 04:35 AM    BUN 12 06/18/2019 04:35 AM    CALCIUM 8.1 (L) 06/18/2019 04:35 AM      Lab Results   Component Value Date    GLUCOSE 83 06/18/2019    GLUCOSE 95 01/11/2011     Lab Results   Component Value Date/Time    TSH 1.590 02/10/2015 08:46 AM    T4FREE 1.22 08/27/2014 09:45 AM     No results found for: PTH  Lab Results   Component Value Date    MG 1.9 08/27/2014     No results found for: PHOS  Lab Results   Component Value Date    VITD25 32 02/10/2015    VITD25 47 08/04/2014    VITD25 56 06/07/2011    VITD25 42 03/02/2011     No results found for: CALCITONIN  No results found for: PTHRP  No results found for: Underwood Marion  No results found for: [address] Records/Labs/Images Review:   I personally reviewed and summarized previous records   All labs and imaging were reviewed independently    Impression and plan:  Glenn Howard who is 66 y.o. female in the clinic today management of the following issues     Osteoporosis   · Unfortunately BMD continue to decline while on Boshosphonate (Fosamax and Reclast)   · Last DEXA scan 8/2019 showed sever osteoporosis (LS T score -4.3, fem Neck T score - 3.3)   · Will start

## 2019-07-31 NOTE — LETTER
Age of menopause early 46s  The patient diet does include dairy products, including milk and cheese     DEXA scan 6/30/2016  LS T score of -3.4  Left hip has a T score of -3.3. The right hip has a T score of -3.4    Most recent DEXA SCAN 8/7/2018  LS T score - 4.3  Fem Neck T score - 3.3     No recent falls, lives is a condo no stairs     PAST MEDICAL HISTORY   Past Medical History:   Diagnosis Date    At[address] [address]     Hyperlipidemia     Osteoporosis      PAST SURGICAL HISTORY   Past Surgical History:   Procedure Laterality Date    APPENDECTOMY      FIXATION KYPHOPLASTY      FIXATION KYPHOPLASTY  1/5/13    t 5    HYSTERECTOMY      KYPHOSIS SURGERY N/A 6/17/2019    L1 KYPHOPLASTY AND THORACIC 12 performed by Caty Knight DO at [address] History     Socioeconomic History    Marital status:       Spouse name: Not on file    Number of children: Not on file    Years of education: Not on file    Highest education level: Not on file   Occupational History    Not on file   Social Needs    Financial resource strain: Not on file    Food insecurity:     Worry: Not on file     Inability: Not on file    Transportation needs:     Medical: Not on file     Non-medical: Not on file   Tobacco Use    Smoking status: Never Smoker    Smokeless tobacco: Never Used   Substance and Sexual Activity    Alcohol use: No    Drug use: No    Sexual activity: Not on file   Lifestyle    Physical activity:     Days per week: Not on file     Minutes per session: Not on file    Stress: Not on file   Relationships    Social connections:     Talks on phone: Not on file     Gets together: Not on file     Attends Jewish service: Not on file     Active member of club or organization: Not on file     Attends meetings of clubs or organizations: Not on file     Relationship status: Not on file    Intimate partner violence:     Fear of current or ex partner: Not on file

## 2019-08-02 DIAGNOSIS — M81.0 SENILE OSTEOPOROSIS: ICD-10-CM

## 2019-08-05 ENCOUNTER — HOSPITAL ENCOUNTER (OUTPATIENT)
Age: 79
Discharge: HOME | End: 2019-08-05
Payer: MEDICARE

## 2019-08-05 ENCOUNTER — TELEPHONE (OUTPATIENT)
Dept: ENDOCRINOLOGY | Age: 79
End: 2019-08-05

## 2019-08-05 DIAGNOSIS — M81.0 OSTEOPOROSIS, UNSPECIFIED OSTEOPOROSIS TYPE, UNSPECIFIED PATHOLOGICAL FRACTURE PRESENCE: ICD-10-CM

## 2019-08-05 LAB
ANION GAP SERPL CALCULATED.3IONS-SCNC: 11 MMOL/L (ref 7–16)
BUN BLDV-MCNC: 17 MG/DL (ref 8–23)
CALCIUM SERPL-MCNC: 9.6 MG/DL (ref 8.6–10.2)
CHLORIDE BLD-SCNC: 96 MMOL/L (ref 98–107)
CO2: 30 MMOL/L (ref 22–29)
CREAT SERPL-MCNC: 0.7 MG/DL (ref 0.5–1)
GFR AFRICAN AMERICAN: >60
GFR NON-AFRICAN AMERICAN: >60 ML/MIN/1.73
GLUCOSE BLD-MCNC: 89 MG/DL (ref 74–99)
POTASSIUM SERPL-SCNC: 4.3 MMOL/L (ref 3.5–5)
SODIUM BLD-SCNC: 137 MMOL/L (ref 132–146)
VITAMIN D 25-HYDROXY: 42 NG/ML (ref 30–100)

## 2019-08-05 PROCEDURE — 36415 COLL VENOUS BLD VENIPUNCTURE: CPT

## 2019-08-05 PROCEDURE — 80048 BASIC METABOLIC PNL TOTAL CA: CPT

## 2019-08-05 PROCEDURE — 82306 VITAMIN D 25 HYDROXY: CPT

## 2019-09-09 ENCOUNTER — HOSPITAL ENCOUNTER (OUTPATIENT)
Dept: INFUSION THERAPY | Age: 79
Setting detail: INFUSION SERIES
Discharge: HOME | End: 2019-09-09
Payer: MEDICARE

## 2019-09-09 VITALS
HEART RATE: 61 BPM | WEIGHT: 126 LBS | SYSTOLIC BLOOD PRESSURE: 113 MMHG | DIASTOLIC BLOOD PRESSURE: 58 MMHG | OXYGEN SATURATION: 99 % | BODY MASS INDEX: 23.19 KG/M2 | HEIGHT: 62 IN | TEMPERATURE: 97.9 F | RESPIRATION RATE: 16 BRPM

## 2019-09-09 DIAGNOSIS — M81.0 SENILE OSTEOPOROSIS: Primary | ICD-10-CM

## 2019-09-09 PROCEDURE — 6360000002 HC RX W HCPCS: Performed by: INTERNAL MEDICINE

## 2019-09-09 PROCEDURE — 96372 THER/PROPH/DIAG INJ SC/IM: CPT

## 2019-09-09 RX ADMIN — DENOSUMAB 60 MG: 60 INJECTION SUBCUTANEOUS at 10:26

## 2019-10-31 ENCOUNTER — HOSPITAL ENCOUNTER (OUTPATIENT)
Dept: GENERAL RADIOLOGY | Age: 79
Discharge: HOME | End: 2019-11-02
Payer: MEDICARE

## 2019-10-31 ENCOUNTER — HOSPITAL ENCOUNTER (OUTPATIENT)
Dept: ULTRASOUND IMAGING | Age: 79
Discharge: HOME | End: 2019-11-02
Payer: MEDICARE

## 2019-10-31 DIAGNOSIS — N30.10 INTERSTITIAL CYSTITIS (CHRONIC) WITHOUT HEMATURIA: ICD-10-CM

## 2019-10-31 DIAGNOSIS — N30.10 INTERSTITIAL CYSTITIS: ICD-10-CM

## 2019-10-31 PROCEDURE — 76770 US EXAM ABDO BACK WALL COMP: CPT

## 2019-10-31 PROCEDURE — 74018 RADEX ABDOMEN 1 VIEW: CPT

## 2019-12-16 ENCOUNTER — HOSPITAL ENCOUNTER (OUTPATIENT)
Dept: CT IMAGING | Age: 79
Discharge: HOME | End: 2019-12-18
Payer: MEDICARE

## 2019-12-16 DIAGNOSIS — R91.1 LUNG NODULE: ICD-10-CM

## 2019-12-16 PROCEDURE — 71250 CT THORAX DX C-: CPT

## 2020-02-01 ENCOUNTER — HOSPITAL ENCOUNTER (OUTPATIENT)
Dept: CT IMAGING | Age: 80
Discharge: HOME | End: 2020-02-03
Payer: MEDICARE

## 2020-02-01 PROCEDURE — 74176 CT ABD & PELVIS W/O CONTRAST: CPT

## 2020-02-03 ENCOUNTER — OFFICE VISIT (OUTPATIENT)
Dept: ENDOCRINOLOGY | Age: 80
End: 2020-02-03
Payer: MEDICARE

## 2020-02-03 VITALS
HEART RATE: 65 BPM | WEIGHT: 130.8 LBS | SYSTOLIC BLOOD PRESSURE: 118 MMHG | OXYGEN SATURATION: 96 % | BODY MASS INDEX: 24.07 KG/M2 | DIASTOLIC BLOOD PRESSURE: 70 MMHG | HEIGHT: 62 IN | RESPIRATION RATE: 16 BRPM

## 2020-02-03 PROBLEM — Z91.81 AT RISK FOR FALLING: Status: ACTIVE | Noted: 2020-02-03

## 2020-02-03 PROCEDURE — 99214 OFFICE O/P EST MOD 30 MIN: CPT | Performed by: INTERNAL MEDICINE

## 2020-02-03 NOTE — PROGRESS NOTES
[address] Department of Endocrinology Diabetes and Metabolism   [address] [facility] [address]   Phone: [phone]  Fax: [phone]    Date of Service: 2/3/2020    Primary Care Physician: Latoya Reveles MD.  Provider: Guevara Gallardo MD        Reason for the visit:  Osteoporosis, VitD deficiency     History of present illness: The history is provided by the patient. No  was used. Accuracy of the patient data is excellent  Juan Dueñas is a very pleasant 78 y.o. female with history of significant osteoporosis and recurrent compression fractures with multiple kyphoplasty surgeries (most recent Kyphoplasty T12-L1 in 6/2019) seen today for follow up visit     The patient was diagnosed with osteoporosis many years ago. She was treated with Fosamax and Relact for many years but unfortunately her bone density continue to decline while on Bipshosphonate     She was switched to Prolia in July/2019 and tolerated it very well   Received one dose pf Prolia in 7/2019     On Ca 600mg/800u + Vit D 1000 iu supplementation  Age of menopause early 46s  The patient diet does include dairy products, including milk and cheese     DEXA scan 6/30/2016  LS T score of -3.4  Left hip has a T score of -3.3. The right hip has a T score of -3.4    Most recent DEXA SCAN 8/7/2018  LS T score - 4.3  Fem Neck T score - 3.3      No recent falls, lives is a condo no stairs     PAST MEDICAL HISTORY   Past Medical History:   Diagnosis Date    Atrial fibrillation ([address]     Hyperlipidemia     Osteoporosis      PAST SURGICAL HISTORY   Past Surgical History:   Procedure Laterality Date    APPENDECTOMY      FIXATION KYPHOPLASTY      FIXATION KYPHOPLASTY  1/5/13    t 5    HYSTERECTOMY      KYPHOSIS SURGERY N/A 6/17/2019    L1 KYPHOPLASTY AND THORACIC 12 performed by Martin Rausch DO at [address] History     Socioeconomic History    Marital status:   mg by mouth Daily with supper       vitamin D (CHOLECALCIFEROL) 1000 UNIT TABS tablet Take 4,000 Units by mouth every morning       metoprolol (TOPROL-XL) 50 MG XL tablet Take 1 tablet by mouth daily. (Patient taking differently: Take 50 mg by mouth every morning ) 30 tablet 0    docusate sodium (COLACE) 100 MG capsule Take 100 mg by mouth 2 times daily       simvastatin (ZOCOR) 40 MG tablet Take 40 mg by mouth nightly. No current facility-administered medications for this visit. Review of Systems    Constitutional: No fever, no chills, no diaphoresis, no generalized weakness. HEENT: No blurred vision, No sore throat, no ear pain, no hair loss  Neck: denied any neck swelling, difficulty swallowing,   Cadrdiopulomary: No CP, SOB or palpitation, No orthopnea or PND. No cough or wheezing. GI: No N/V/D, no constipation, No abdominal pain, no melena or hematochezia   : Denied any dysuria, hematuria, flank pain, discharge, or incontinence. Skin: denied any rash, ulcer, Hirsute, or hyperpigmentation. MSK: kyphosis   Neuro: no numbess, no tingling, no weakness  Psychiatric/Behavioral: Negative for sleep disturbance and dysphoric mood. The patient is not nervous/anxious. Objective:   /70 (Site: Left Upper Arm, Position: Sitting, Cuff Size: Small Adult)   Pulse 65   Resp 16   Ht 5' 2\" (1.575 m)   Wt 130 lb 12.8 oz (59.3 kg)   SpO2 96%   BMI 23.92 kg/m²   BP Readings from Last 4 Encounters:   02/03/20 118/70   01/29/20 130/80   09/09/19 (!) 113/58   07/31/19 126/78     Wt Readings from Last 6 Encounters:   02/03/20 130 lb 12.8 oz (59.3 kg)   01/29/20 131 lb (59.4 kg)   09/09/19 126 lb (57.2 kg)   07/31/19 127 lb 12.8 oz (58 kg)   06/17/19 132 lb 6.4 oz (60.1 kg)   07/14/18 128 lb (58.1 kg)       Physical examination:  General: awake alert, no abnormal position or movements. HEENT: normocephalic non traumatic.   Neck: supple, no LN enlargement, no thyromegaly, no thyroid tenderness, no

## 2020-02-03 NOTE — LETTER
On Ca 600mg/800u + Vit D 1000 iu supplementation  Age of menopause early 46s  The patient diet does include dairy products, including milk and cheese     DEXA scan 6/30/2016  LS T score of -3.4  Left hip has a T score of -3.3. The right hip has a T score of -3.4    Most recent DEXA SCAN 8/7/2018  LS T score - 4.3  Fem Neck T score - 3.3      No recent falls, lives is a condo no stairs     PAST MEDICAL HISTORY   Past Medical History:   Diagnosis Date    Atrial fibrillation ([address]     Hyperlipidemia     Osteoporosis      PAST SURGICAL HISTORY   Past Surgical History:   Procedure Laterality Date    APPENDECTOMY      FIXATION KYPHOPLASTY      FIXATION KYPHOPLASTY  1/5/13    t 5    HYSTERECTOMY      KYPHOSIS SURGERY N/A 6/17/2019    L1 KYPHOPLASTY AND THORACIC 12 performed by Ladi León DO at [address] [place] [address] Street History     Socioeconomic History    Marital status:       Spouse name: Not on file    Number of children: Not on file    Years of education: Not on file    Highest education level: Not on file   Occupational History    Not on file   Social Needs    Financial resource strain: Not on file    Food insecurity:     Worry: Not on file     Inability: Not on file    Transportation needs:     Medical: Not on file     Non-medical: Not on file   Tobacco Use    Smoking status: Never Smoker    Smokeless tobacco: Never Used   Substance and Sexual Activity    Alcohol use: No    Drug use: No    Sexual activity: Not on file   Lifestyle    Physical activity:     Days per week: Not on file     Minutes per session: Not on file    Stress: Not on file   Relationships    Social connections:     Talks on phone: Not on file     Gets together: Not on file     Attends Advent service: Not on file     Active member of club or organization: Not on file     Attends meetings of clubs or organizations: Not on file     Relationship status: Not on file    Intimate partner violence: Skin: denied any rash, ulcer, Hirsute, or hyperpigmentation. MSK: kyphosis   Neuro: no numbess, no tingling, no weakness  Psychiatric/Behavioral: Negative for sleep disturbance and dysphoric mood. The patient is not nervous/anxious. Objective:   /70 (Site: Left Upper Arm, Position: Sitting, Cuff Size: Small Adult)   Pulse 65   Resp 16   Ht 5' 2\" (1.575 m)   Wt 130 lb 12.8 oz (59.3 kg)   SpO2 96%   BMI 23.92 kg/m²    BP Readings from Last 4 Encounters:   02/03/20 118/70   01/29/20 130/80   09/09/19 (!) 113/58   07/31/19 126/78     Wt Readings from Last 6 Encounters:   02/03/20 130 lb 12.8 oz (59.3 kg)   01/29/20 131 lb (59.4 kg)   09/09/19 126 lb (57.2 kg)   07/31/19 127 lb 12.8 oz (58 kg)   06/17/19 132 lb 6.4 oz (60.1 kg)   07/14/18 128 lb (58.1 kg)       Physical examination:  General: awake alert, no abnormal position or movements. HEENT: normocephalic non traumatic. Neck: supple, no LN enlargement, no thyromegaly, no thyroid tenderness, no JVD. Pulm: clear equal air entry no added sounds,  CVS: S1 + S2, no murmur, no heave. Abd: soft lax no tenderness, no organomegaly, audible bowel sounds. MSK: + back deformity  Skin: warm, no lesions, no rash.  No striae no Bruises   Neuro: CN intact, sensation notmal , muscle power normal  Psych: normal mood, and affect     Lab review   I personally reviewed the following labs:   Lab Results   Component Value Date/Time    WBC 6.3 06/18/2019 05:10 AM    RBC 4.02 06/18/2019 05:10 AM    HGB 11.4 (L) 06/18/2019 05:10 AM    HCT 34.7 06/18/2019 05:10 AM    MCV 86.3 06/18/2019 05:10 AM    MCH 28.4 06/18/2019 05:10 AM    MCHC 32.9 06/18/2019 05:10 AM    RDW 13.4 06/18/2019 05:10 AM     06/18/2019 05:10 AM    MPV 8.7 06/18/2019 05:10 AM      Lab Results   Component Value Date/Time     08/05/2019 09:50 AM    K 4.3 08/05/2019 09:50 AM    K 4.2 06/13/2019 06:25 AM    CO2 30 (H) 08/05/2019 09:50 AM    BUN 17 08/05/2019 09:50 AM CALCIUM 9.6 08/05/2019 09:50 AM      Lab Results   Component Value Date    GLUCOSE 89 08/05/2019    GLUCOSE 95 01/11/2011     Lab Results   Component Value Date/Time    TSH 1.590 02/10/2015 08:46 AM    T4FREE 1.22 08/27/2014 09:45 AM     No results found for: PTH  Lab Results   Component Value Date    MG 1.9 08/27/2014     No results found for: PHOS  Lab Results   Component Value Date    VITD25 42 08/05/2019    VITD25 32 02/10/2015    VITD25 47 08/04/2014    VITD25 56 06/07/2011     No results found for: CALCITONIN  No results found for: PTHRP  No results found for: Cheli Summers  No results found for: [address] Records/Labs/Images Review:   I personally reviewed and summarized previous records   All labs and imaging were reviewed independently    Impression and plan:  Daniel Panchal who is 78 y.o. female in the clinic today management of the following issues     Osteoporosis   · Unfortunately BMD continue to decline while on Boshosphonate (Fosamax and Reclast)   · Last DEXA scan 8/2019 showed sever osteoporosis (LS T score -4.3, fem Neck T score - 3.3)   · Started on Prolia and tolerating it very well. First dose was 7/2019, next dose in 3/2020   · Discussed the importance of replacing vitD deficiency   · Discussed the importance of muscle strengthening and weight bearing exercise. Gravitational forces on the skeleton will help limit calcium liberation from the bones. · check VitD and Calcium level before prolia injection     VitD deficiency   · Continue vitD to 4000 + units daily   · Continue current calcium dose     HLD   · Continue Zocor 40 mg daily     Falling risk:  · Discussed falls precautions    lives is a condo no stairs     Return in about 6 months (around 8/3/2020) for Osteoporosis, vitD deficiency . The above issues were reviewed with the patient who understood and agreed with the plan. Thank you for allowing us to participate in the care of this patient.

## 2020-03-02 ENCOUNTER — HOSPITAL ENCOUNTER (OUTPATIENT)
Age: 80
Discharge: HOME | End: 2020-03-02
Payer: MEDICARE

## 2020-03-02 LAB
ANION GAP SERPL CALCULATED.3IONS-SCNC: 10 MMOL/L (ref 7–16)
BUN BLDV-MCNC: 16 MG/DL (ref 8–23)
CALCIUM SERPL-MCNC: 9.5 MG/DL (ref 8.6–10.2)
CHLORIDE BLD-SCNC: 97 MMOL/L (ref 98–107)
CO2: 30 MMOL/L (ref 22–29)
CREAT SERPL-MCNC: 0.7 MG/DL (ref 0.5–1)
GFR AFRICAN AMERICAN: >60
GFR NON-AFRICAN AMERICAN: >60 ML/MIN/1.73
GLUCOSE BLD-MCNC: 85 MG/DL (ref 74–99)
POTASSIUM SERPL-SCNC: 4.4 MMOL/L (ref 3.5–5)
SODIUM BLD-SCNC: 137 MMOL/L (ref 132–146)
VITAMIN D 25-HYDROXY: 46 NG/ML (ref 30–100)

## 2020-03-02 PROCEDURE — 82306 VITAMIN D 25 HYDROXY: CPT

## 2020-03-02 PROCEDURE — 80048 BASIC METABOLIC PNL TOTAL CA: CPT

## 2020-03-02 PROCEDURE — 36415 COLL VENOUS BLD VENIPUNCTURE: CPT

## 2020-03-04 ENCOUNTER — TELEPHONE (OUTPATIENT)
Dept: ENDOCRINOLOGY | Age: 80
End: 2020-03-04

## 2020-03-11 ENCOUNTER — HOSPITAL ENCOUNTER (OUTPATIENT)
Dept: INFUSION THERAPY | Age: 80
Setting detail: INFUSION SERIES
End: 2020-03-11
Payer: MEDICARE

## 2020-03-16 ENCOUNTER — HOSPITAL ENCOUNTER (OUTPATIENT)
Dept: INFUSION THERAPY | Age: 80
Setting detail: INFUSION SERIES
Discharge: HOME | End: 2020-03-16
Payer: MEDICARE

## 2020-03-16 VITALS
SYSTOLIC BLOOD PRESSURE: 130 MMHG | OXYGEN SATURATION: 97 % | DIASTOLIC BLOOD PRESSURE: 60 MMHG | HEIGHT: 59 IN | BODY MASS INDEX: 25.8 KG/M2 | WEIGHT: 128 LBS | TEMPERATURE: 98 F | RESPIRATION RATE: 16 BRPM | HEART RATE: 61 BPM

## 2020-03-16 DIAGNOSIS — M81.0 SENILE OSTEOPOROSIS: Primary | ICD-10-CM

## 2020-03-16 PROCEDURE — 96372 THER/PROPH/DIAG INJ SC/IM: CPT

## 2020-03-16 PROCEDURE — 6360000002 HC RX W HCPCS: Performed by: INTERNAL MEDICINE

## 2020-03-16 RX ADMIN — DENOSUMAB 60 MG: 60 INJECTION SUBCUTANEOUS at 09:35

## 2020-08-24 ENCOUNTER — OFFICE VISIT (OUTPATIENT)
Dept: ENDOCRINOLOGY | Age: 80
End: 2020-08-24
Payer: MEDICARE

## 2020-08-24 VITALS
OXYGEN SATURATION: 96 % | RESPIRATION RATE: 16 BRPM | HEART RATE: 64 BPM | SYSTOLIC BLOOD PRESSURE: 126 MMHG | DIASTOLIC BLOOD PRESSURE: 76 MMHG | WEIGHT: 128.8 LBS | TEMPERATURE: 98.3 F | BODY MASS INDEX: 26.01 KG/M2

## 2020-08-24 PROCEDURE — 99214 OFFICE O/P EST MOD 30 MIN: CPT | Performed by: INTERNAL MEDICINE

## 2020-08-24 NOTE — PROGRESS NOTES
[address] [facility] Department of Endocrinology Diabetes and Metabolism   [address] [facility] [address]   Phone: [phone]  Fax: [phone]    Date of Service: 8/24/2020    Primary Care Physician: Ling Dwyer MD.  Provider: John Kurtz MD        Reason for the visit:  Osteoporosis, VitD deficiency     History of present illness: The history is provided by the patient. No  was used. Accuracy of the patient data is excellent  Eve Chi is a very pleasant 78 y.o. female with history of significant osteoporosis and recurrent compression fractures with multiple kyphoplasty surgeries (most recent Kyphoplasty T12-L1 in 6/2019) seen today for follow up visit     The patient was diagnosed with osteoporosis many years ago. She was treated with Fosamax and Relact for many years but unfortunately her bone density continue to decline while on Bipshosphonate     She was switched to Prolia in July/2019 and tolerated it very well   Received one dose pf Prolia in 8/2019     On Ca and vitD supplement (4000U/day)   Age of menopause early 46s  The patient diet does include dairy products, including milk and cheese     DEXA scan 6/30/2016  LS T score of -3.4, Left hip has a T score of -3.3. The right hip has a T score of -3.4    DEXA SCAN 8/7/2018  LS T score - 4.3, Fem Neck T score - 3.3     Had DEXA 8/2020 -- result not available      No recent falls, lives is a condo no stairs     PAST MEDICAL HISTORY   Past Medical History:   Diagnosis Date    Atrial fibrillation ([address]     Hyperlipidemia     Osteoporosis      PAST SURGICAL HISTORY   Past Surgical History:   Procedure Laterality Date    APPENDECTOMY      FIXATION KYPHOPLASTY      FIXATION KYPHOPLASTY  1/5/13    t 5    HYSTERECTOMY      KYPHOSIS SURGERY N/A 6/17/2019    L1 KYPHOPLASTY AND THORACIC 12 performed by Kaiser Guzman DO at [facility]   Tobacco:   reports that she has never smoked. weakness  Psychiatric/Behavioral: Negative for sleep disturbance and dysphoric mood. The patient is not nervous/anxious. Objective:   /76 (Site: Left Upper Arm, Position: Sitting, Cuff Size: Medium Adult)   Pulse 64   Temp 98.3 °F (36.8 °C) (Temporal)   Resp 16   Wt 128 lb 12.8 oz (58.4 kg)   SpO2 96%   BMI 26.01 kg/m²   BP Readings from Last 4 Encounters:   08/24/20 126/76   03/16/20 130/60   02/03/20 118/70   01/29/20 130/80     Wt Readings from Last 6 Encounters:   08/24/20 128 lb 12.8 oz (58.4 kg)   03/16/20 128 lb (58.1 kg)   02/03/20 130 lb 12.8 oz (59.3 kg)   01/29/20 131 lb (59.4 kg)   09/09/19 126 lb (57.2 kg)   07/31/19 127 lb 12.8 oz (58 kg)       Physical examination:  General: awake alert, no abnormal position or movements. HEENT: normocephalic non traumatic. Neck: supple, no LN enlargement, no thyromegaly, no thyroid tenderness, no JVD. Pulm: clear equal air entry no added sounds,  CVS: S1 + S2, no murmur, no heave. Abd: soft lax no tenderness, no organomegaly, audible bowel sounds. MSK: + back deformity  Skin: warm, no lesions, no rash.  No striae no Bruises   Neuro: CN intact, sensation notmal , muscle power normal  Psych: normal mood, and affect     Lab review   I personally reviewed the following labs:   Lab Results   Component Value Date/Time    WBC 6.3 06/18/2019 05:10 AM    RBC 4.02 06/18/2019 05:10 AM    HGB 11.4 (L) 06/18/2019 05:10 AM    HCT 34.7 06/18/2019 05:10 AM    MCV 86.3 06/18/2019 05:10 AM    MCH 28.4 06/18/2019 05:10 AM    MCHC 32.9 06/18/2019 05:10 AM    RDW 13.4 06/18/2019 05:10 AM     06/18/2019 05:10 AM    MPV 8.7 06/18/2019 05:10 AM      Lab Results   Component Value Date/Time     03/02/2020 09:53 AM    K 4.4 03/02/2020 09:53 AM    K 4.2 06/13/2019 06:25 AM    CO2 30 (H) 03/02/2020 09:53 AM    BUN 16 03/02/2020 09:53 AM    CALCIUM 9.5 03/02/2020 09:53 AM      Lab Results   Component Value Date    GLUCOSE 85 03/02/2020    GLUCOSE 95 01/11/2011 Lab Results   Component Value Date/Time    TSH 1.590 02/10/2015 08:46 AM    T4FREE 1.22 08/27/2014 09:45 AM     No results found for: PTH  Lab Results   Component Value Date    MG 1.9 08/27/2014     No results found for: PHOS  Lab Results   Component Value Date    VITD25 46 03/02/2020    VITD25 42 08/05/2019    VITD25 32 02/10/2015    VITD25 47 08/04/2014     No results found for: CALCITONIN  No results found for: PTHRP  No results found for: Joss Bench  No results found for: [address] Records/Labs/Images Review:   I personally reviewed and summarized previous records   All labs and imaging were reviewed independently    Impression and plan:  Kandice Police who is 78 y.o. female in the clinic today management of the following issues     Sever Osteoporosis   · Unfortunately BMD continue to decline while on Boshosphonate (Fosamax and Reclast)   · Now tolerating Prolai very well, received two shot so far, 7/2019, 3/2020. Due for next injection in 9/2020   · Discussed the importance of replacing vitD deficiency   · Discussed the importance of muscle strengthening and weight bearing exercise. Gravitational forces on the skeleton will help limit calcium liberation from the bones. · check VitD and Calcium level before prolia injection   · Will call and get result of DEXA scan that was done few weeks ago     VitD deficiency   · Continue vitD to 4000 + units daily   · Continue current calcium dose     HLD   · Continue Zocor 40 mg daily     Falling risk:  · Discussed falls precautions  · lives is a condo no stairs     Return in about 1 year (around 8/24/2021) for Osteoporosis . The above issues were reviewed with the patient who understood and agreed with the plan. Thank you for allowing us to participate in the care of this patient. Please do not hesitate to contact us with any additional questions. Diagnosis Orders   1.  Osteoporosis, unspecified osteoporosis type, unspecified pathological fracture presence  Vitamin D 25 Hydroxy    Basic Metabolic Panel   2. Vitamin D deficiency     3. At risk for falling       Zenon Fischer MD  Endocrinologist, [facility])   [address] [facility], [address] [facility],[address] [address]   Phone: [phone]  Fax: [phone]  -------------------------------------------------------------  An electronic signature was used to authenticate this note.  Mg Lopez MD on 8/24/2020 at 10:42 AM

## 2020-09-11 ENCOUNTER — HOSPITAL ENCOUNTER (OUTPATIENT)
Age: 80
Discharge: HOME | End: 2020-09-11
Payer: MEDICARE

## 2020-09-11 LAB
ANION GAP SERPL CALCULATED.3IONS-SCNC: 8 MMOL/L (ref 7–16)
BUN BLDV-MCNC: 12 MG/DL (ref 8–23)
CALCIUM SERPL-MCNC: 9.1 MG/DL (ref 8.6–10.2)
CHLORIDE BLD-SCNC: 99 MMOL/L (ref 98–107)
CO2: 29 MMOL/L (ref 22–29)
CREAT SERPL-MCNC: 0.7 MG/DL (ref 0.5–1)
GFR AFRICAN AMERICAN: >60
GFR NON-AFRICAN AMERICAN: >60 ML/MIN/1.73
GLUCOSE BLD-MCNC: 86 MG/DL (ref 74–99)
POTASSIUM SERPL-SCNC: 4.2 MMOL/L (ref 3.5–5)
SODIUM BLD-SCNC: 136 MMOL/L (ref 132–146)
VITAMIN D 25-HYDROXY: 65 NG/ML (ref 30–100)

## 2020-09-11 PROCEDURE — 80048 BASIC METABOLIC PNL TOTAL CA: CPT

## 2020-09-11 PROCEDURE — 82306 VITAMIN D 25 HYDROXY: CPT

## 2020-09-11 PROCEDURE — 36415 COLL VENOUS BLD VENIPUNCTURE: CPT

## 2020-09-13 ENCOUNTER — TELEPHONE (OUTPATIENT)
Dept: ENDOCRINOLOGY | Age: 80
End: 2020-09-13

## 2020-09-13 NOTE — TELEPHONE ENCOUNTER
Notify pt,  I have reviewed your recent results    Both calcium and VitD are good, will proceed with Prolia injection

## 2020-09-22 ENCOUNTER — HOSPITAL ENCOUNTER (OUTPATIENT)
Dept: INFUSION THERAPY | Age: 80
Setting detail: INFUSION SERIES
Discharge: HOME | End: 2020-09-22
Payer: MEDICARE

## 2020-09-22 VITALS
TEMPERATURE: 98.6 F | WEIGHT: 128 LBS | SYSTOLIC BLOOD PRESSURE: 121 MMHG | OXYGEN SATURATION: 97 % | HEART RATE: 65 BPM | BODY MASS INDEX: 25.85 KG/M2 | DIASTOLIC BLOOD PRESSURE: 59 MMHG | RESPIRATION RATE: 16 BRPM

## 2020-09-22 DIAGNOSIS — M81.0 SENILE OSTEOPOROSIS: Primary | ICD-10-CM

## 2020-09-22 PROCEDURE — 96372 THER/PROPH/DIAG INJ SC/IM: CPT

## 2020-09-22 PROCEDURE — 6360000002 HC RX W HCPCS: Performed by: INTERNAL MEDICINE

## 2020-09-22 RX ADMIN — DENOSUMAB 60 MG: 60 INJECTION SUBCUTANEOUS at 08:52

## 2021-02-08 ENCOUNTER — HOSPITAL ENCOUNTER (OUTPATIENT)
Dept: CT IMAGING | Age: 81
Discharge: HOME | End: 2021-02-10
Payer: MEDICARE

## 2021-02-08 ENCOUNTER — HOSPITAL ENCOUNTER (OUTPATIENT)
Age: 81
Discharge: HOME | End: 2021-02-10
Payer: MEDICARE

## 2021-02-08 DIAGNOSIS — R91.1 PULMONARY NODULE: ICD-10-CM

## 2021-02-08 PROCEDURE — 71250 CT THORAX DX C-: CPT

## 2021-02-10 ENCOUNTER — HOSPITAL ENCOUNTER (OUTPATIENT)
Dept: GENERAL RADIOLOGY | Age: 81
Discharge: HOME | End: 2021-02-12
Payer: MEDICARE

## 2021-02-10 ENCOUNTER — HOSPITAL ENCOUNTER (OUTPATIENT)
Age: 81
Discharge: HOME | End: 2021-02-12
Payer: MEDICARE

## 2021-02-10 DIAGNOSIS — G89.29 CHRONIC PAIN OF TOE OF LEFT FOOT: ICD-10-CM

## 2021-02-10 DIAGNOSIS — R07.89 OTHER CHEST PAIN: ICD-10-CM

## 2021-02-10 DIAGNOSIS — M79.675 CHRONIC PAIN OF TOE OF LEFT FOOT: ICD-10-CM

## 2021-02-10 PROCEDURE — 71101 X-RAY EXAM UNILAT RIBS/CHEST: CPT

## 2021-02-10 PROCEDURE — 73660 X-RAY EXAM OF TOE(S): CPT

## 2021-02-17 ENCOUNTER — HOSPITAL ENCOUNTER (OUTPATIENT)
Dept: GENERAL RADIOLOGY | Age: 81
Discharge: HOME | End: 2021-02-19
Payer: MEDICARE

## 2021-02-17 ENCOUNTER — HOSPITAL ENCOUNTER (OUTPATIENT)
Age: 81
Discharge: HOME | End: 2021-02-19
Payer: MEDICARE

## 2021-02-17 DIAGNOSIS — M79.672 LEFT FOOT PAIN: ICD-10-CM

## 2021-02-17 PROCEDURE — 73630 X-RAY EXAM OF FOOT: CPT

## 2021-03-15 ENCOUNTER — HOSPITAL ENCOUNTER (OUTPATIENT)
Age: 81
Discharge: HOME | End: 2021-03-15
Payer: MEDICARE

## 2021-03-15 DIAGNOSIS — M81.0 OSTEOPOROSIS, UNSPECIFIED OSTEOPOROSIS TYPE, UNSPECIFIED PATHOLOGICAL FRACTURE PRESENCE: Primary | ICD-10-CM

## 2021-03-15 DIAGNOSIS — E55.9 VITAMIN D DEFICIENCY: ICD-10-CM

## 2021-03-15 DIAGNOSIS — M81.0 OSTEOPOROSIS, UNSPECIFIED OSTEOPOROSIS TYPE, UNSPECIFIED PATHOLOGICAL FRACTURE PRESENCE: ICD-10-CM

## 2021-03-15 LAB
ANION GAP SERPL CALCULATED.3IONS-SCNC: 8 MMOL/L (ref 7–16)
BUN BLDV-MCNC: 14 MG/DL (ref 8–23)
CALCIUM SERPL-MCNC: 9.8 MG/DL (ref 8.6–10.2)
CHLORIDE BLD-SCNC: 98 MMOL/L (ref 98–107)
CO2: 31 MMOL/L (ref 22–29)
CREAT SERPL-MCNC: 0.7 MG/DL (ref 0.5–1)
GFR AFRICAN AMERICAN: >60
GFR NON-AFRICAN AMERICAN: >60 ML/MIN/1.73
GLUCOSE BLD-MCNC: 92 MG/DL (ref 74–99)
POTASSIUM SERPL-SCNC: 4.3 MMOL/L (ref 3.5–5)
SODIUM BLD-SCNC: 137 MMOL/L (ref 132–146)

## 2021-03-15 PROCEDURE — 80048 BASIC METABOLIC PNL TOTAL CA: CPT

## 2021-03-15 PROCEDURE — 36415 COLL VENOUS BLD VENIPUNCTURE: CPT

## 2021-03-16 ENCOUNTER — HOSPITAL ENCOUNTER (OUTPATIENT)
Dept: GENERAL RADIOLOGY | Age: 81
Discharge: HOME | End: 2021-03-18
Payer: MEDICARE

## 2021-03-16 ENCOUNTER — HOSPITAL ENCOUNTER (OUTPATIENT)
Age: 81
Discharge: HOME | End: 2021-03-18
Payer: MEDICARE

## 2021-03-16 DIAGNOSIS — R07.89 OTHER CHEST PAIN: ICD-10-CM

## 2021-03-16 PROCEDURE — 71101 X-RAY EXAM UNILAT RIBS/CHEST: CPT

## 2021-03-18 ENCOUNTER — TELEPHONE (OUTPATIENT)
Dept: ENDOCRINOLOGY | Age: 81
End: 2021-03-18

## 2021-03-19 NOTE — TELEPHONE ENCOUNTER
Notify pt,  I have reviewed your recent results    Calcium level is very good.  Ok to proceed with Prolia as scheduled

## 2021-03-23 ENCOUNTER — HOSPITAL ENCOUNTER (OUTPATIENT)
Dept: INFUSION THERAPY | Age: 81
Setting detail: INFUSION SERIES
Discharge: HOME | End: 2021-03-23
Payer: MEDICARE

## 2021-03-23 VITALS
OXYGEN SATURATION: 100 % | HEIGHT: 59 IN | TEMPERATURE: 97.9 F | RESPIRATION RATE: 16 BRPM | SYSTOLIC BLOOD PRESSURE: 132 MMHG | HEART RATE: 62 BPM | DIASTOLIC BLOOD PRESSURE: 61 MMHG | BODY MASS INDEX: 25.8 KG/M2 | WEIGHT: 128 LBS

## 2021-03-23 DIAGNOSIS — M81.0 SENILE OSTEOPOROSIS: Primary | ICD-10-CM

## 2021-03-23 PROCEDURE — 96372 THER/PROPH/DIAG INJ SC/IM: CPT

## 2021-03-23 PROCEDURE — 6360000002 HC RX W HCPCS: Performed by: INTERNAL MEDICINE

## 2021-03-23 RX ADMIN — DENOSUMAB 60 MG: 60 INJECTION SUBCUTANEOUS at 08:56

## 2021-08-24 ENCOUNTER — OFFICE VISIT (OUTPATIENT)
Dept: ENDOCRINOLOGY | Age: 81
End: 2021-08-24
Payer: MEDICARE

## 2021-08-24 VITALS
WEIGHT: 129 LBS | HEIGHT: 59 IN | RESPIRATION RATE: 18 BRPM | BODY MASS INDEX: 26 KG/M2 | DIASTOLIC BLOOD PRESSURE: 74 MMHG | HEART RATE: 94 BPM | SYSTOLIC BLOOD PRESSURE: 138 MMHG

## 2021-08-24 DIAGNOSIS — M81.0 OSTEOPOROSIS, UNSPECIFIED OSTEOPOROSIS TYPE, UNSPECIFIED PATHOLOGICAL FRACTURE PRESENCE: Primary | ICD-10-CM

## 2021-08-24 DIAGNOSIS — E55.9 VITAMIN D DEFICIENCY: ICD-10-CM

## 2021-08-24 PROCEDURE — 99214 OFFICE O/P EST MOD 30 MIN: CPT | Performed by: INTERNAL MEDICINE

## 2021-08-24 NOTE — PROGRESS NOTES
[address] Department of Endocrinology Diabetes and Metabolism   [address] [facility] [address]   Phone: [phone]  Fax: [phone]    Date of Service: 8/24/2021  Primary Care Physician: Kayleigh Arana MD.  Provider: Michael Lopez MD        Reason for the visit:  Osteoporosis, VitD deficiency     History of present illness: The history is provided by the patient. No  was used. Accuracy of the patient data is excellent  Ally Pruitt is a very pleasant [de-identified] y.o. female with history of significant osteoporosis and recurrent compression fractures with multiple kyphoplasty surgeries (most recent Kyphoplasty T12-L1 in 6/2019) seen today for follow up visit     The patient was diagnosed with osteoporosis many years ago. She was treated with Fosamax and Relact for many years but unfortunately her bone density continue to decline while on Bipshosphonate     She was switched to Prolia in July/2019 and tolerated it very well   Received one dose pf Prolia in 8/2019     On Ca and vitD supplement (4000U/day)   Age of menopause early 46s  The patient diet does include dairy products, including milk and cheese     DEXA scan 6/30/2016  LS T score of -3.4, Left hip has a T score of -3.3. The right hip has a T score of -3.4    DEXA SCAN 8/7/2018  LS T score - 4.3, Fem Neck T score - 3.3     Had DEXA 8/2020 -- result not available      No recent falls, lives is a condo no stairs     PAST MEDICAL HISTORY   Past Medical History:   Diagnosis Date    Atrial fibrillation ([address]     Hyperlipidemia     Osteoporosis      PAST SURGICAL HISTORY   Past Surgical History:   Procedure Laterality Date    APPENDECTOMY      FIXATION KYPHOPLASTY      FIXATION KYPHOPLASTY  1/5/13    t 5    HYSTERECTOMY      KYPHOSIS SURGERY N/A 6/17/2019    L1 KYPHOPLASTY AND THORACIC 12 performed by Augusta Nguyen DO at [facility]   Tobacco:   reports that she has never smoked.  She has never used smokeless tobacco.  Alcol:   reports no history of alcohol use. Illicit Drugs:   reports no history of drug use. FAMILY HISTORY   No family history on file. ALLERGIES AND DRUG REACTIONS   Allergies   Allergen Reactions    Bacitracin      Eye ointment       CURRENT MEDICATIONS     Current Outpatient Medications   Medication Sig Dispense Refill    Calcium Carb-Cholecalciferol (CALCIUM 600+D) 600-800 MG-UNIT TABS Take 1 tablet by mouth every morning      latanoprost (XALATAN) 0.005 % ophthalmic solution Place 1 drop into both eyes nightly      Multiple Vitamins-Minerals (PRESERVISION AREDS 2) CAPS Take 1 capsule by mouth every morning      warfarin (COUMADIN) 3 MG tablet Take by mouth Daily with supper Alternate 3 with 3.5 daily      vitamin D (CHOLECALCIFEROL) 1000 UNIT TABS tablet Take 4,000 Units by mouth every morning       metoprolol (TOPROL-XL) 50 MG XL tablet Take 1 tablet by mouth daily. (Patient taking differently: Take 50 mg by mouth every morning ) 30 tablet 0    docusate sodium (COLACE) 100 MG capsule Take 100 mg by mouth 2 times daily       simvastatin (ZOCOR) 40 MG tablet Take 40 mg by mouth nightly.  denosumab (PROLIA) 60 MG/ML SOSY SC injection Inject 1 mL into the skin once for 1 dose 1 mL 1     No current facility-administered medications for this visit. Review of Systems    Constitutional: No fever, no chills, no diaphoresis, no generalized weakness. HEENT: No blurred vision, No sore throat, no ear pain, no hair loss  Neck: denied any neck swelling, difficulty swallowing,   Cadrdiopulomary: No CP, SOB or palpitation, No orthopnea or PND. No cough or wheezing. GI: No N/V/D, no constipation, No abdominal pain, no melena or hematochezia   : Denied any dysuria, hematuria, flank pain, discharge, or incontinence. Skin: denied any rash, ulcer, Hirsute, or hyperpigmentation.    MSK: kyphosis   Neuro: no numbess, no tingling, no weakness  Psychiatric/Behavioral: Negative for sleep disturbance and dysphoric mood. The patient is not nervous/anxious. Objective:   /74   Pulse 94   Resp 18   Ht 4' 11\" (1.499 m)   Wt 129 lb (58.5 kg)   BMI 26.05 kg/m²   BP Readings from Last 4 Encounters:   08/24/21 138/74   03/23/21 132/61   09/22/20 (!) 121/59   08/24/20 126/76     Wt Readings from Last 6 Encounters:   08/24/21 129 lb (58.5 kg)   03/23/21 128 lb (58.1 kg)   09/22/20 128 lb (58.1 kg)   08/24/20 128 lb 12.8 oz (58.4 kg)   03/16/20 128 lb (58.1 kg)   02/03/20 130 lb 12.8 oz (59.3 kg)       Physical examination:  General: awake alert, no abnormal position or movements. HEENT: normocephalic non traumatic. Neck: supple, no LN enlargement, no thyromegaly, no thyroid tenderness, no JVD. Pulm: clear equal air entry no added sounds,  CVS: S1 + S2, no murmur, no heave. Abd: soft lax no tenderness, no organomegaly, audible bowel sounds. MSK: + back deformity  Skin: warm, no lesions, no rash.  No striae no Bruises   Neuro: CN intact, sensation notmal , muscle power normal  Psych: normal mood, and affect     Lab review   I personally reviewed the following labs:   Lab Results   Component Value Date/Time    WBC 6.3 06/18/2019 05:10 AM    RBC 4.02 06/18/2019 05:10 AM    HGB 11.4 (L) 06/18/2019 05:10 AM    HCT 34.7 06/18/2019 05:10 AM    MCV 86.3 06/18/2019 05:10 AM    MCH 28.4 06/18/2019 05:10 AM    MCHC 32.9 06/18/2019 05:10 AM    RDW 13.4 06/18/2019 05:10 AM     06/18/2019 05:10 AM    MPV 8.7 06/18/2019 05:10 AM      Lab Results   Component Value Date/Time     03/15/2021 10:00 AM    K 4.3 03/15/2021 10:00 AM    K 4.2 06/13/2019 06:25 AM    CO2 31 (H) 03/15/2021 10:00 AM    BUN 14 03/15/2021 10:00 AM    CALCIUM 9.8 03/15/2021 10:00 AM      Lab Results   Component Value Date    GLUCOSE 92 03/15/2021    GLUCOSE 95 01/11/2011     Lab Results   Component Value Date/Time    TSH 1.590 02/10/2015 08:46 AM    T4FREE 1.22 08/27/2014 09:45 AM     No results found for: PTH  Lab Results   Component Value Date    MG 1.9 08/27/2014     No results found for: PHOS  Lab Results   Component Value Date    VITD25 65 09/11/2020    VITD25 46 03/02/2020    VITD25 42 08/05/2019    VITD25 32 02/10/2015     No results found for: CALCITONIN  No results found for: PTHRP  No results found for: Yancy Moser  No results found for: [address] Records/Labs/Images Review:   I personally reviewed and summarized previous records   All labs and imaging were reviewed independently    Impression and plan:  Isaak Garcia who is [de-identified] y.o. female in the clinic today management of the following issues     Sever Osteoporosis   · BMD continue to decline while on Boshosphonate (Fosamax and Reclast)   · Now tolerating Prolai very well, Due for next injection in 9/2021  · Discussed the importance of replacing vitD deficiency   · Discussed the importance of muscle strengthening and weight bearing exercise. Gravitational forces on the skeleton will help limit calcium liberation from the bones. · check VitD and Calcium level before prolia injection   · DEXA scan in 8/2022     VitD deficiency   · Continue vitD to 4000U/day   · Continue current calcium dose     HLD   · Continue Zocor 40 mg daily     I personally reviewed external notes from PCP and other patient's care team providers, and personally interpreted labs associated with the above diagnosis. I also ordered labs to further assess and manage the above addressed medical conditions    Return in about 1 year (around 8/24/2022) for osteoporosis, VitD deficiency. The above issues were reviewed with the patient who understood and agreed with the plan. Thank you for allowing us to participate in the care of this patient. Please do not hesitate to contact us with any additional questions. Diagnosis Orders   1.  Osteoporosis, unspecified osteoporosis type, unspecified pathological fracture presence  Vitamin D 25 Hydroxy    Basic Metabolic Panel    DEXA BONE DENSITY AXIAL SKELETON   2. Vitamin D deficiency       Meryle Laurence MD  Endocrinologist, [facility])   [address] [facility], [address] [place] [address],[facility] [address] [id number]   Phone: [phone]  Fax: [phone]  -------------------------------------------------------------  An electronic signature was used to authenticate this note.  Chaya Kim MD on 8/24/2021 at 10:14 AM

## 2021-08-30 DIAGNOSIS — M81.0 OSTEOPOROSIS, UNSPECIFIED OSTEOPOROSIS TYPE, UNSPECIFIED PATHOLOGICAL FRACTURE PRESENCE: ICD-10-CM

## 2021-09-15 ENCOUNTER — HOSPITAL ENCOUNTER (OUTPATIENT)
Age: 81
Discharge: HOME | End: 2021-09-15
Payer: MEDICARE

## 2021-09-15 DIAGNOSIS — M81.0 OSTEOPOROSIS, UNSPECIFIED OSTEOPOROSIS TYPE, UNSPECIFIED PATHOLOGICAL FRACTURE PRESENCE: ICD-10-CM

## 2021-09-15 LAB
ANION GAP SERPL CALCULATED.3IONS-SCNC: 7 MMOL/L (ref 7–16)
BUN BLDV-MCNC: 15 MG/DL (ref 6–23)
CALCIUM SERPL-MCNC: 9.2 MG/DL (ref 8.6–10.2)
CHLORIDE BLD-SCNC: 100 MMOL/L (ref 98–107)
CO2: 30 MMOL/L (ref 22–29)
CREAT SERPL-MCNC: 0.7 MG/DL (ref 0.5–1)
GFR AFRICAN AMERICAN: >60
GFR NON-AFRICAN AMERICAN: >60 ML/MIN/1.73
GLUCOSE BLD-MCNC: 86 MG/DL (ref 74–99)
POTASSIUM SERPL-SCNC: 4.1 MMOL/L (ref 3.5–5)
SODIUM BLD-SCNC: 137 MMOL/L (ref 132–146)
VITAMIN D 25-HYDROXY: 74 NG/ML (ref 30–100)

## 2021-09-15 PROCEDURE — 36415 COLL VENOUS BLD VENIPUNCTURE: CPT

## 2021-09-15 PROCEDURE — 82306 VITAMIN D 25 HYDROXY: CPT

## 2021-09-15 PROCEDURE — 80048 BASIC METABOLIC PNL TOTAL CA: CPT

## 2021-09-19 ENCOUNTER — TELEPHONE (OUTPATIENT)
Dept: ENDOCRINOLOGY | Age: 81
End: 2021-09-19

## 2021-09-19 NOTE — TELEPHONE ENCOUNTER
Notify pt,  I have reviewed your recent results    Both calcium an vitD are very good, will proceed with Prolia injection as scheduled

## 2021-09-23 ENCOUNTER — HOSPITAL ENCOUNTER (OUTPATIENT)
Dept: INFUSION THERAPY | Age: 81
Setting detail: INFUSION SERIES
Discharge: HOME | End: 2021-09-23
Payer: MEDICARE

## 2021-09-23 VITALS
HEART RATE: 62 BPM | OXYGEN SATURATION: 99 % | DIASTOLIC BLOOD PRESSURE: 63 MMHG | WEIGHT: 129 LBS | SYSTOLIC BLOOD PRESSURE: 136 MMHG | HEIGHT: 59 IN | TEMPERATURE: 97.8 F | RESPIRATION RATE: 16 BRPM | BODY MASS INDEX: 26 KG/M2

## 2021-09-23 DIAGNOSIS — M81.0 SENILE OSTEOPOROSIS: Primary | ICD-10-CM

## 2021-09-23 PROCEDURE — 96372 THER/PROPH/DIAG INJ SC/IM: CPT

## 2021-09-23 PROCEDURE — 6360000002 HC RX W HCPCS: Performed by: INTERNAL MEDICINE

## 2021-09-23 RX ORDER — CYANOCOBALAMIN 1000 UG/ML
1000 INJECTION INTRAMUSCULAR; SUBCUTANEOUS
COMMUNITY
End: 2022-03-24 | Stop reason: ALTCHOICE

## 2021-09-23 RX ADMIN — DENOSUMAB 60 MG: 60 INJECTION SUBCUTANEOUS at 09:02

## 2022-02-21 DIAGNOSIS — M81.0 OSTEOPOROSIS, UNSPECIFIED OSTEOPOROSIS TYPE, UNSPECIFIED PATHOLOGICAL FRACTURE PRESENCE: Primary | ICD-10-CM

## 2022-02-21 DIAGNOSIS — E55.9 VITAMIN D DEFICIENCY: ICD-10-CM

## 2022-03-16 ENCOUNTER — HOSPITAL ENCOUNTER (OUTPATIENT)
Age: 82
Discharge: HOME | End: 2022-03-16
Payer: MEDICARE

## 2022-03-16 DIAGNOSIS — M81.0 OSTEOPOROSIS, UNSPECIFIED OSTEOPOROSIS TYPE, UNSPECIFIED PATHOLOGICAL FRACTURE PRESENCE: ICD-10-CM

## 2022-03-16 DIAGNOSIS — E55.9 VITAMIN D DEFICIENCY: ICD-10-CM

## 2022-03-16 LAB
ANION GAP SERPL CALCULATED.3IONS-SCNC: 8 MMOL/L (ref 7–16)
BUN BLDV-MCNC: 16 MG/DL (ref 6–23)
CALCIUM SERPL-MCNC: 9.5 MG/DL (ref 8.6–10.2)
CHLORIDE BLD-SCNC: 99 MMOL/L (ref 98–107)
CO2: 29 MMOL/L (ref 22–29)
CREAT SERPL-MCNC: 0.7 MG/DL (ref 0.5–1)
GFR AFRICAN AMERICAN: >60
GFR NON-AFRICAN AMERICAN: >60 ML/MIN/1.73
GLUCOSE BLD-MCNC: 96 MG/DL (ref 74–99)
POTASSIUM SERPL-SCNC: 4.2 MMOL/L (ref 3.5–5)
SODIUM BLD-SCNC: 136 MMOL/L (ref 132–146)
VITAMIN D 25-HYDROXY: 75 NG/ML (ref 30–100)

## 2022-03-16 PROCEDURE — 82306 VITAMIN D 25 HYDROXY: CPT

## 2022-03-16 PROCEDURE — 80048 BASIC METABOLIC PNL TOTAL CA: CPT

## 2022-03-16 PROCEDURE — 36415 COLL VENOUS BLD VENIPUNCTURE: CPT

## 2022-03-24 ENCOUNTER — HOSPITAL ENCOUNTER (OUTPATIENT)
Dept: INFUSION THERAPY | Age: 82
Setting detail: INFUSION SERIES
Discharge: HOME | End: 2022-03-24
Payer: MEDICARE

## 2022-03-24 VITALS
BODY MASS INDEX: 25.65 KG/M2 | SYSTOLIC BLOOD PRESSURE: 129 MMHG | OXYGEN SATURATION: 96 % | DIASTOLIC BLOOD PRESSURE: 67 MMHG | TEMPERATURE: 97.9 F | HEART RATE: 73 BPM | WEIGHT: 127 LBS | RESPIRATION RATE: 18 BRPM

## 2022-03-24 DIAGNOSIS — M81.0 SENILE OSTEOPOROSIS: Primary | ICD-10-CM

## 2022-03-24 PROCEDURE — 6360000002 HC RX W HCPCS: Performed by: INTERNAL MEDICINE

## 2022-03-24 PROCEDURE — 96372 THER/PROPH/DIAG INJ SC/IM: CPT

## 2022-03-24 RX ORDER — LANOLIN ALCOHOL/MO/W.PET/CERES
1000 CREAM (GRAM) TOPICAL DAILY
COMMUNITY

## 2022-03-24 RX ADMIN — DENOSUMAB 60 MG: 60 INJECTION SUBCUTANEOUS at 09:08

## 2022-03-24 NOTE — PROGRESS NOTES
Tolerated injection well. Reviewed therapy plan, offered education material and/or discharge material, reviewed medication information and signs and symptoms  and educated on possible side effects, verbalizes good knowledge of current plan patient verbalizes understanding, and has no signs or symptoms to report at this time. Patient discharged. Patient alert and oriented x3. No distress noted. Vital signs stable. Patient denies any new or worsening pain. Patient denies any needs. All questions answered. Next appointment scheduled. Declines copy of AVS. Instructed on lab draw for next injection.

## 2022-07-15 ENCOUNTER — HOSPITAL ENCOUNTER (OUTPATIENT)
Dept: GENERAL RADIOLOGY | Age: 82
Discharge: HOME | End: 2022-07-17
Payer: MEDICARE

## 2022-07-15 ENCOUNTER — HOSPITAL ENCOUNTER (OUTPATIENT)
Age: 82
Discharge: HOME | End: 2022-07-17
Payer: MEDICARE

## 2022-07-15 DIAGNOSIS — R07.89 OTHER CHEST PAIN: ICD-10-CM

## 2022-07-15 PROCEDURE — 71101 X-RAY EXAM UNILAT RIBS/CHEST: CPT

## 2022-08-11 ENCOUNTER — HOSPITAL ENCOUNTER (OUTPATIENT)
Age: 82
Discharge: HOME | End: 2022-08-13
Payer: MEDICARE

## 2022-08-11 ENCOUNTER — HOSPITAL ENCOUNTER (OUTPATIENT)
Dept: GENERAL RADIOLOGY | Age: 82
Discharge: HOME | End: 2022-08-13
Payer: MEDICARE

## 2022-08-11 DIAGNOSIS — R07.89 OTHER CHEST PAIN: ICD-10-CM

## 2022-08-11 DIAGNOSIS — M81.0 OSTEOPOROSIS, UNSPECIFIED OSTEOPOROSIS TYPE, UNSPECIFIED PATHOLOGICAL FRACTURE PRESENCE: ICD-10-CM

## 2022-08-11 DIAGNOSIS — M81.0 OSTEOPOROSIS, UNSPECIFIED OSTEOPOROSIS TYPE, UNSPECIFIED PATHOLOGICAL FRACTURE PRESENCE: Primary | ICD-10-CM

## 2022-08-11 DIAGNOSIS — Z91.81 AT RISK FOR FALLING: ICD-10-CM

## 2022-08-11 DIAGNOSIS — E55.9 VITAMIN D DEFICIENCY: ICD-10-CM

## 2022-08-11 DIAGNOSIS — Z01.812 PRE-PROCEDURE LAB EXAM: ICD-10-CM

## 2022-08-11 PROCEDURE — 71101 X-RAY EXAM UNILAT RIBS/CHEST: CPT

## 2022-08-15 DIAGNOSIS — M81.0 OSTEOPOROSIS, UNSPECIFIED OSTEOPOROSIS TYPE, UNSPECIFIED PATHOLOGICAL FRACTURE PRESENCE: ICD-10-CM

## 2022-08-26 ENCOUNTER — OFFICE VISIT (OUTPATIENT)
Dept: ENDOCRINOLOGY | Age: 82
End: 2022-08-26
Payer: MEDICARE

## 2022-08-26 VITALS
BODY MASS INDEX: 25.2 KG/M2 | HEART RATE: 65 BPM | OXYGEN SATURATION: 97 % | SYSTOLIC BLOOD PRESSURE: 136 MMHG | HEIGHT: 59 IN | DIASTOLIC BLOOD PRESSURE: 75 MMHG | WEIGHT: 125 LBS

## 2022-08-26 DIAGNOSIS — E55.9 VITAMIN D DEFICIENCY: ICD-10-CM

## 2022-08-26 DIAGNOSIS — M81.0 OSTEOPOROSIS, UNSPECIFIED OSTEOPOROSIS TYPE, UNSPECIFIED PATHOLOGICAL FRACTURE PRESENCE: Primary | ICD-10-CM

## 2022-08-26 DIAGNOSIS — Z91.81 AT RISK FOR FALLING: ICD-10-CM

## 2022-08-26 PROCEDURE — 99214 OFFICE O/P EST MOD 30 MIN: CPT | Performed by: INTERNAL MEDICINE

## 2022-08-26 PROCEDURE — 1123F ACP DISCUSS/DSCN MKR DOCD: CPT | Performed by: INTERNAL MEDICINE

## 2022-08-26 NOTE — PROGRESS NOTES
[address] [place] [address] Department of Endocrinology Diabetes and Metabolism   [address] [facility] [address]   Phone: [phone]  Fax: [phone]    Date of Service: 8/26/2022  Primary Care Physician: Mabel Victor MD.  Provider: Vilma Kurtz MD        Reason for the visit:  Osteoporosis, VitD deficiency     History of present illness: The history is provided by the patient. No  was used. Accuracy of the patient data is excellent  Darron Ortiz is a very pleasant 80 y.o. female with history of significant osteoporosis and recurrent compression fractures with multiple kyphoplasty surgeries (most recent Kyphoplasty T12-L1 in 6/2019) seen today for follow up visit     The patient was diagnosed with osteoporosis many years ago. She was treated with Fosamax and Relact for many years but unfortunately her bone density continue to decline while on Bipshosphonate     She was switched to Prolia in July/2019 and tolerated it very well   Received one dose pf Prolia in 8/2019     On Ca and vitD supplement (4000U/day)   Age of menopause early 46s  The patient diet does include dairy products, including milk and cheese     DEXA scan 6/30/2016  LS T score of -3.4, Left hip has a T score of -3.3.  The right hip has a T score of -3.4    DEXA SCAN 8/7/2018  LS T score - 4.3, Fem Neck T score - 3.3     Had DEXA 8/2022 --> still showing osteoporosis but BMD stable comparing with previous scans      No recent falls, lives is a condo no stairs     PAST MEDICAL HISTORY   Past Medical History:   Diagnosis Date    Atrial fibrillation ([address]     Hyperlipidemia     Osteoporosis     Shingles 02/2022     PAST SURGICAL HISTORY   Past Surgical History:   Procedure Laterality Date    APPENDECTOMY      FIXATION KYPHOPLASTY      FIXATION KYPHOPLASTY  1/5/13    t 5    HYSTERECTOMY (CERVIX STATUS UNKNOWN)      KYPHOSIS SURGERY N/A 6/17/2019    L1 KYPHOPLASTY AND THORACIC 12 performed by Jackie Parks Salo, DO at [facility]   Tobacco:   reports that she has never smoked. She has never used smokeless tobacco.  Alcol:   reports no history of alcohol use. Illicit Drugs:   reports no history of drug use. FAMILY HISTORY   No family history on file. ALLERGIES AND DRUG REACTIONS   Allergies   Allergen Reactions    Bacitracin      Eye ointment    Macrobid [Nitrofurantoin] Rash       CURRENT MEDICATIONS     Current Outpatient Medications   Medication Sig Dispense Refill    denosumab (PROLIA) 60 MG/ML SOSY SC injection Inject 1 ML into the skin for 1 dose 1 mL 1    vitamin B-12 (CYANOCOBALAMIN) 1000 MCG tablet Take 1,000 mcg by mouth daily      Calcium Carb-Cholecalciferol (CALCIUM 600+D) 600-800 MG-UNIT TABS Take 1 tablet by mouth every morning      latanoprost (XALATAN) 0.005 % ophthalmic solution Place 1 drop into both eyes nightly      Multiple Vitamins-Minerals (PRESERVISION AREDS 2) CAPS Take 1 capsule by mouth every morning      warfarin (COUMADIN) 3 MG tablet Take by mouth Daily with supper Alternate 3 with 3.5 daily      vitamin D (CHOLECALCIFEROL) 1000 UNIT TABS tablet Take 4,000 Units by mouth every morning       metoprolol (TOPROL-XL) 50 MG XL tablet Take 1 tablet by mouth daily. (Patient taking differently: Take 50 mg by mouth every morning ) 30 tablet 0    docusate sodium (COLACE) 100 MG capsule Take 100 mg by mouth 2 times daily       simvastatin (ZOCOR) 40 MG tablet Take 40 mg by mouth nightly. No current facility-administered medications for this visit. Review of Systems    Constitutional: No fever, no chills, no diaphoresis, no generalized weakness. HEENT: No blurred vision, No sore throat, no ear pain, no hair loss  Neck: denied any neck swelling, difficulty swallowing,   Cadrdiopulomary: No CP, SOB or palpitation, No orthopnea or PND. No cough or wheezing.   GI: No N/V/D, no constipation, No abdominal pain, no melena or hematochezia   : Denied any dysuria, hematuria, flank pain, discharge, or incontinence. Skin: denied any rash, ulcer, Hirsute, or hyperpigmentation. MSK: kyphosis   Neuro: no numbess, no tingling, no weakness  Psychiatric/Behavioral: Negative for sleep disturbance and dysphoric mood. The patient is not nervous/anxious. Objective:   /75   Pulse 65   Ht 4' 11\" (1.499 m)   Wt 125 lb (56.7 kg)   SpO2 97%   BMI 25.25 kg/m²   BP Readings from Last 4 Encounters:   08/26/22 136/75   03/24/22 129/67   09/23/21 136/63   08/24/21 138/74     Wt Readings from Last 6 Encounters:   08/26/22 125 lb (56.7 kg)   03/24/22 127 lb (57.6 kg)   09/23/21 129 lb (58.5 kg)   08/24/21 129 lb (58.5 kg)   03/23/21 128 lb (58.1 kg)   09/22/20 128 lb (58.1 kg)       Physical examination:  General: awake alert, no abnormal position or movements. HEENT: normocephalic non traumatic. Neck: supple, no LN enlargement, no thyromegaly, no thyroid tenderness, no JVD. Pulm: clear equal air entry no added sounds,  CVS: S1 + S2, no murmur, no heave. Abd: soft lax no tenderness, no organomegaly, audible bowel sounds. MSK: + back deformity  Skin: warm, no lesions, no rash.  No striae no Bruises   Neuro: CN intact, sensation notmal , muscle power normal  Psych: normal mood, and affect     Lab review   I personally reviewed the following labs:   Lab Results   Component Value Date/Time    WBC 6.3 06/18/2019 05:10 AM    RBC 4.02 06/18/2019 05:10 AM    HGB 11.4 (L) 06/18/2019 05:10 AM    HCT 34.7 06/18/2019 05:10 AM    MCV 86.3 06/18/2019 05:10 AM    MCH 28.4 06/18/2019 05:10 AM    MCHC 32.9 06/18/2019 05:10 AM    RDW 13.4 06/18/2019 05:10 AM     06/18/2019 05:10 AM    MPV 8.7 06/18/2019 05:10 AM      Lab Results   Component Value Date/Time     03/16/2022 09:35 AM    K 4.2 03/16/2022 09:35 AM    K 4.2 06/13/2019 06:25 AM    CO2 29 03/16/2022 09:35 AM    BUN 16 03/16/2022 09:35 AM    CALCIUM 9.5 03/16/2022 09:35 AM      Lab Results   Component Value Date/Time GLUCOSE 96 03/16/2022 09:35 AM    GLUCOSE 95 01/11/2011 10:51 AM     Lab Results   Component Value Date/Time    TSH 1.590 02/10/2015 08:46 AM    T4FREE 1.22 08/27/2014 09:45 AM     No results found for: PTH  Lab Results   Component Value Date/Time    MG 1.9 08/27/2014 09:45 AM     No results found for: PHOS  Lab Results   Component Value Date/Time    VITD25 75 03/16/2022 09:35 AM    VITD25 74 09/15/2021 10:39 AM    VITD25 65 09/11/2020 10:55 AM    VITD25 46 03/02/2020 09:53 AM     No results found for: CALCITONIN  No results found for: PTHRP  No results found for: Veronica Grady  No results found for: [employer] Records/Labs/Images Review:   I personally reviewed and summarized previous records   All labs and imaging were reviewed independently    Impression and plan:  Easton Santacruz who is 80 y.o. female in the clinic today management of the following issues     Sever Osteoporosis   Continue Prolia injections,  tolerating Prolai very well   Recent DEXA scan showed stable BMD   In the past BMD was declining while while on Boshosphonate (Fosamax and Reclast)   Discussed the importance of replacing vitD deficiency   Discussed the importance of muscle strengthening and weight bearing exercise. Gravitational forces on the skeleton will help limit calcium liberation from the bones. VitD deficiency   Continue vitD supplement   Continue current calcium dose     HLD   Continue statin     I personally reviewed external notes from PCP and other patient's care team providers, and personally interpreted labs associated with the above diagnosis. I also ordered labs to further assess and manage the above addressed medical conditions    Return in about 1 year (around 8/26/2023) for osteoporosis, VitD deficiency. The above issues were reviewed with the patient who understood and agreed with the plan. Thank you for allowing us to participate in the care of this patient.  Please do not hesitate to contact us with any additional questions. Diagnosis Orders   1. Osteoporosis, unspecified osteoporosis type, unspecified pathological fracture presence  Basic Metabolic Panel    Vitamin D 25 Hydroxy      2. Vitamin D deficiency  Basic Metabolic Panel    Vitamin D 25 Hydroxy      3. At risk for falling            Kamaljit Nieves MD  Endocrinologist, [facility] [address]    [address] [facility], [address] [facility],[address] [address]   Phone: [phone]  Fax: [phone]  -------------------------------------------------------------  An electronic signature was used to authenticate this note.  Kyra Yoder MD on 8/26/2022 at 9:29 AM

## 2022-09-19 ENCOUNTER — HOSPITAL ENCOUNTER (OUTPATIENT)
Age: 82
Discharge: HOME | End: 2022-09-19
Payer: MEDICARE

## 2022-09-19 DIAGNOSIS — M81.0 OSTEOPOROSIS, UNSPECIFIED OSTEOPOROSIS TYPE, UNSPECIFIED PATHOLOGICAL FRACTURE PRESENCE: ICD-10-CM

## 2022-09-19 DIAGNOSIS — E55.9 VITAMIN D DEFICIENCY: ICD-10-CM

## 2022-09-19 LAB
ANION GAP SERPL CALCULATED.3IONS-SCNC: 9 MMOL/L (ref 7–16)
BUN BLDV-MCNC: 20 MG/DL (ref 6–23)
CALCIUM SERPL-MCNC: 9.4 MG/DL (ref 8.6–10.2)
CHLORIDE BLD-SCNC: 99 MMOL/L (ref 98–107)
CO2: 31 MMOL/L (ref 22–29)
CREAT SERPL-MCNC: 0.7 MG/DL (ref 0.5–1)
GFR AFRICAN AMERICAN: >60
GFR NON-AFRICAN AMERICAN: >60 ML/MIN/1.73
GLUCOSE BLD-MCNC: 65 MG/DL (ref 74–99)
POTASSIUM SERPL-SCNC: 4.4 MMOL/L (ref 3.5–5)
SODIUM BLD-SCNC: 139 MMOL/L (ref 132–146)
VITAMIN D 25-HYDROXY: 82 NG/ML (ref 30–100)

## 2022-09-19 PROCEDURE — 82306 VITAMIN D 25 HYDROXY: CPT

## 2022-09-19 PROCEDURE — 36415 COLL VENOUS BLD VENIPUNCTURE: CPT

## 2022-09-19 PROCEDURE — 80048 BASIC METABOLIC PNL TOTAL CA: CPT

## 2022-09-23 ENCOUNTER — TELEPHONE (OUTPATIENT)
Dept: ENDOCRINOLOGY | Age: 82
End: 2022-09-23

## 2022-09-23 NOTE — TELEPHONE ENCOUNTER
Notify pt,  I have reviewed your recent results    Labs result very good ok  to proceed with prolia injection

## 2022-09-26 ENCOUNTER — HOSPITAL ENCOUNTER (OUTPATIENT)
Dept: INFUSION THERAPY | Age: 82
Setting detail: INFUSION SERIES
Discharge: HOME | End: 2022-09-26
Payer: MEDICARE

## 2022-09-26 VITALS
RESPIRATION RATE: 16 BRPM | TEMPERATURE: 97.2 F | HEIGHT: 59 IN | OXYGEN SATURATION: 100 % | BODY MASS INDEX: 24.8 KG/M2 | SYSTOLIC BLOOD PRESSURE: 106 MMHG | WEIGHT: 123 LBS | DIASTOLIC BLOOD PRESSURE: 66 MMHG | HEART RATE: 59 BPM

## 2022-09-26 DIAGNOSIS — M81.0 SENILE OSTEOPOROSIS: Primary | ICD-10-CM

## 2022-09-26 PROCEDURE — 6360000002 HC RX W HCPCS: Performed by: INTERNAL MEDICINE

## 2022-09-26 PROCEDURE — 96372 THER/PROPH/DIAG INJ SC/IM: CPT

## 2022-09-26 RX ADMIN — DENOSUMAB 60 MG: 60 INJECTION SUBCUTANEOUS at 08:56

## 2022-09-26 NOTE — PROGRESS NOTES
Tolerated prolia injection well. Reviewed therapy plan, offered education material and/or discharge material, reviewed medication information and signs and symptoms  and educated on possible side effects, verbalizes good knowledge of current plan patient verbalizes understanding, and has no signs or symptoms to report at this time. Patient discharged. Patient alert and oriented x3. No distress noted. Vital signs stable. Patient denies any new or worsening pain. Patient denies any needs. All questions answered. Next appointment scheduled. [employer] copy of AVS. Instructed on lab draw for next injection.

## 2022-10-10 ENCOUNTER — HOSPITAL ENCOUNTER (OUTPATIENT)
Age: 82
Discharge: HOME | End: 2022-10-12
Payer: MEDICARE

## 2022-10-10 ENCOUNTER — HOSPITAL ENCOUNTER (OUTPATIENT)
Dept: GENERAL RADIOLOGY | Age: 82
Discharge: HOME | End: 2022-10-12
Payer: MEDICARE

## 2022-10-10 DIAGNOSIS — M54.6 PAIN IN THORACIC SPINE: ICD-10-CM

## 2022-10-10 DIAGNOSIS — R07.89 OTHER CHEST PAIN: ICD-10-CM

## 2022-10-10 PROCEDURE — 71101 X-RAY EXAM UNILAT RIBS/CHEST: CPT

## 2022-10-10 PROCEDURE — 72072 X-RAY EXAM THORAC SPINE 3VWS: CPT

## 2023-03-20 ENCOUNTER — HOSPITAL ENCOUNTER (OUTPATIENT)
Age: 83
Discharge: HOME | End: 2023-03-20
Payer: MEDICARE

## 2023-03-20 DIAGNOSIS — Z91.81 AT RISK FOR FALLING: ICD-10-CM

## 2023-03-20 DIAGNOSIS — M81.0 OSTEOPOROSIS, UNSPECIFIED OSTEOPOROSIS TYPE, UNSPECIFIED PATHOLOGICAL FRACTURE PRESENCE: ICD-10-CM

## 2023-03-20 DIAGNOSIS — Z01.812 PRE-PROCEDURE LAB EXAM: ICD-10-CM

## 2023-03-20 DIAGNOSIS — E55.9 VITAMIN D DEFICIENCY: ICD-10-CM

## 2023-03-20 LAB
ANION GAP SERPL CALCULATED.3IONS-SCNC: 7 MMOL/L (ref 7–16)
BUN SERPL-MCNC: 18 MG/DL (ref 6–23)
CALCIUM SERPL-MCNC: 9.4 MG/DL (ref 8.6–10.2)
CHLORIDE SERPL-SCNC: 97 MMOL/L (ref 98–107)
CO2 SERPL-SCNC: 31 MMOL/L (ref 22–29)
CREAT SERPL-MCNC: 0.6 MG/DL (ref 0.5–1)
GLUCOSE SERPL-MCNC: 81 MG/DL (ref 74–99)
POTASSIUM SERPL-SCNC: 4.2 MMOL/L (ref 3.5–5)
SODIUM SERPL-SCNC: 135 MMOL/L (ref 132–146)
VITAMIN D 25-HYDROXY: 62 NG/ML (ref 30–100)

## 2023-03-20 PROCEDURE — 82306 VITAMIN D 25 HYDROXY: CPT

## 2023-03-20 PROCEDURE — 36415 COLL VENOUS BLD VENIPUNCTURE: CPT

## 2023-03-20 PROCEDURE — 80048 BASIC METABOLIC PNL TOTAL CA: CPT

## 2023-03-27 ENCOUNTER — HOSPITAL ENCOUNTER (OUTPATIENT)
Dept: INFUSION THERAPY | Age: 83
Setting detail: INFUSION SERIES
Discharge: HOME | End: 2023-03-27
Payer: MEDICARE

## 2023-03-27 VITALS
HEIGHT: 59 IN | DIASTOLIC BLOOD PRESSURE: 63 MMHG | OXYGEN SATURATION: 98 % | WEIGHT: 123 LBS | SYSTOLIC BLOOD PRESSURE: 137 MMHG | TEMPERATURE: 97.8 F | HEART RATE: 70 BPM | BODY MASS INDEX: 24.8 KG/M2 | RESPIRATION RATE: 16 BRPM

## 2023-03-27 DIAGNOSIS — M81.0 SENILE OSTEOPOROSIS: Primary | ICD-10-CM

## 2023-03-27 PROCEDURE — 96372 THER/PROPH/DIAG INJ SC/IM: CPT

## 2023-03-27 PROCEDURE — 6360000002 HC RX W HCPCS: Performed by: INTERNAL MEDICINE

## 2023-03-27 RX ADMIN — DENOSUMAB 60 MG: 60 INJECTION SUBCUTANEOUS at 09:46

## 2023-07-05 ENCOUNTER — APPOINTMENT (OUTPATIENT)
Dept: CT IMAGING | Age: 83
End: 2023-07-05
Payer: MEDICARE

## 2023-07-05 ENCOUNTER — HOSPITAL ENCOUNTER (INPATIENT)
Age: 83
LOS: 2 days | Discharge: HOME | End: 2023-07-07
Attending: EMERGENCY MEDICINE | Admitting: SURGERY
Payer: MEDICARE

## 2023-07-05 ENCOUNTER — HOSPITAL ENCOUNTER (EMERGENCY)
Age: 83
Discharge: ANOTHER HOSPITAL | End: 2023-07-05
Attending: EMERGENCY MEDICINE
Payer: MEDICARE

## 2023-07-05 VITALS
TEMPERATURE: 98.8 F | HEIGHT: 59 IN | RESPIRATION RATE: 15 BRPM | HEART RATE: 76 BPM | SYSTOLIC BLOOD PRESSURE: 140 MMHG | BODY MASS INDEX: 25.2 KG/M2 | OXYGEN SATURATION: 97 % | WEIGHT: 125 LBS | DIASTOLIC BLOOD PRESSURE: 71 MMHG

## 2023-07-05 DIAGNOSIS — S27.892A MEDIASTINAL HEMATOMA, INITIAL ENCOUNTER: ICD-10-CM

## 2023-07-05 DIAGNOSIS — S22.21XA CLOSED FRACTURE OF MANUBRIUM, INITIAL ENCOUNTER: Primary | ICD-10-CM

## 2023-07-05 DIAGNOSIS — S22.21XA FRACTURE OF MANUBRIUM, INITIAL ENCOUNTER FOR CLOSED FRACTURE: ICD-10-CM
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
## 2023-07-05 DIAGNOSIS — S22.21XA CLOSED FRACTURE OF MANUBRIUM, INITIAL ENCOUNTER: ICD-10-CM
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
## 2023-07-05 LAB
ALBUMIN SERPL-MCNC: 4.1 G/DL (ref 3.5–5.2)
ALP SERPL-CCNC: 57 U/L (ref 35–104)
ALT SERPL-CCNC: 12 U/L (ref 0–32)
ANION GAP SERPL CALCULATED.3IONS-SCNC: 11 MMOL/L (ref 7–16)
AST SERPL-CCNC: 18 U/L (ref 0–31)
BASOPHILS # BLD: 0.03 E9/L (ref 0–0.2)
BASOPHILS NFR BLD: 0.5 % (ref 0–2)
BILIRUB SERPL-MCNC: 0.3 MG/DL (ref 0–1.2)
BUN SERPL-MCNC: 16 MG/DL (ref 6–23)
CALCIUM SERPL-MCNC: 9.2 MG/DL (ref 8.6–10.2)
CHLORIDE SERPL-SCNC: 98 MMOL/L (ref 98–107)
CO2 SERPL-SCNC: 27 MMOL/L (ref 22–29)
CREAT SERPL-MCNC: 0.6 MG/DL (ref 0.5–1)
EKG ATRIAL RATE: 73 BPM
EKG P AXIS: 40 DEGREES
EKG P-R INTERVAL: 158 MS
EKG Q-T INTERVAL: 396 MS
EKG QRS DURATION: 86 MS
EKG QTC CALCULATION (BAZETT): 436 MS
EKG R AXIS: -12 DEGREES
EKG T AXIS: 10 DEGREES
EKG VENTRICULAR RATE: 73 BPM
EOSINOPHIL # BLD: 0.05 E9/L (ref 0.05–0.5)
EOSINOPHIL NFR BLD: 0.8 % (ref 0–6)
ERYTHROCYTE [DISTWIDTH] IN BLOOD BY AUTOMATED COUNT: 14.2 FL (ref 11.5–15)
GLUCOSE SERPL-MCNC: 105 MG/DL (ref 74–99)
HCT VFR BLD AUTO: 35.3 % (ref 34–48)
HCT VFR BLD AUTO: 38.3 % (ref 34–48)
HGB BLD-MCNC: 11.9 G/DL (ref 11.5–15.5)
HGB BLD-MCNC: 12.5 G/DL (ref 11.5–15.5)
IMM GRANULOCYTES # BLD: 0.03 E9/L
IMM GRANULOCYTES NFR BLD: 0.5 % (ref 0–5)
INR BLD: 2.3
INR BLD: 2.4
LIPASE: 36 U/L (ref 13–60)
LYMPHOCYTES # BLD: 1.2 E9/L (ref 1.5–4)
LYMPHOCYTES NFR BLD: 20.2 % (ref 20–42)
MCH RBC QN AUTO: 28.2 PG (ref 26–35)
MCHC RBC AUTO-ENTMCNC: 32.6 % (ref 32–34.5)
MCV RBC AUTO: 86.3 FL (ref 80–99.9)
MONOCYTES # BLD: 0.5 E9/L (ref 0.1–0.95)
MONOCYTES NFR BLD: 8.4 % (ref 2–12)
NEUTROPHILS # BLD: 4.12 E9/L (ref 1.8–7.3)
NEUTS SEG NFR BLD: 69.6 % (ref 43–80)
PLATELET # BLD AUTO: 192 E9/L (ref 130–450)
PMV BLD AUTO: 9.1 FL (ref 7–12)
POTASSIUM SERPL-SCNC: 4.2 MMOL/L (ref 3.5–5)
PROT SERPL-MCNC: 6.7 G/DL (ref 6.4–8.3)
PROTHROMBIN TIME: 24.4 SEC (ref 9.3–12.4)
PROTHROMBIN TIME: 26.5 SEC (ref 9.3–12.4)
RBC # BLD AUTO: 4.44 E12/L (ref 3.5–5.5)
SODIUM SERPL-SCNC: 136 MMOL/L (ref 132–146)
TROPONIN, HIGH SENSITIVITY: 12 NG/L (ref 0–9)
WBC # BLD: 5.9 E9/L (ref 4.5–11.5)

## 2023-07-05 PROCEDURE — 96375 TX/PRO/DX INJ NEW DRUG ADDON: CPT

## 2023-07-05 PROCEDURE — 85014 HEMATOCRIT: CPT

## 2023-07-05 PROCEDURE — 85610 PROTHROMBIN TIME: CPT

## 2023-07-05 PROCEDURE — 6370000000 HC RX 637 (ALT 250 FOR IP)

## 2023-07-05 PROCEDURE — 93010 ELECTROCARDIOGRAM REPORT: CPT | Performed by: INTERNAL MEDICINE

## 2023-07-05 PROCEDURE — 2580000003 HC RX 258

## 2023-07-05 PROCEDURE — 85025 COMPLETE CBC W/AUTO DIFF WBC: CPT

## 2023-07-05 PROCEDURE — 84484 ASSAY OF TROPONIN QUANT: CPT

## 2023-07-05 PROCEDURE — 6360000002 HC RX W HCPCS: Performed by: STUDENT IN AN ORGANIZED HEALTH CARE EDUCATION/TRAINING PROGRAM

## 2023-07-05 PROCEDURE — 99285 EMERGENCY DEPT VISIT HI MDM: CPT

## 2023-07-05 PROCEDURE — 93005 ELECTROCARDIOGRAM TRACING: CPT | Performed by: EMERGENCY MEDICINE

## 2023-07-05 PROCEDURE — 93005 ELECTROCARDIOGRAM TRACING: CPT

## 2023-07-05 PROCEDURE — 6360000004 HC RX CONTRAST MEDICATION: Performed by: RADIOLOGY

## 2023-07-05 PROCEDURE — 6370000000 HC RX 637 (ALT 250 FOR IP): Performed by: EMERGENCY MEDICINE

## 2023-07-05 PROCEDURE — 83690 ASSAY OF LIPASE: CPT

## 2023-07-05 PROCEDURE — 85018 HEMOGLOBIN: CPT

## 2023-07-05 PROCEDURE — 96374 THER/PROPH/DIAG INJ IV PUSH: CPT

## 2023-07-05 PROCEDURE — 72125 CT NECK SPINE W/O DYE: CPT

## 2023-07-05 PROCEDURE — 74177 CT ABD & PELVIS W/CONTRAST: CPT

## 2023-07-05 PROCEDURE — 2580000003 HC RX 258: Performed by: STUDENT IN AN ORGANIZED HEALTH CARE EDUCATION/TRAINING PROGRAM

## 2023-07-05 PROCEDURE — 30233N1 TRANSFUSION OF NONAUTOLOGOUS RED BLOOD CELLS INTO PERIPHERAL VEIN, PERCUTANEOUS APPROACH: ICD-10-PCS | Performed by: EMERGENCY MEDICINE

## 2023-07-05 PROCEDURE — 71260 CT THORAX DX C+: CPT

## 2023-07-05 PROCEDURE — 2060000000 HC ICU INTERMEDIATE R&B

## 2023-07-05 PROCEDURE — 36415 COLL VENOUS BLD VENIPUNCTURE: CPT

## 2023-07-05 PROCEDURE — 80053 COMPREHEN METABOLIC PANEL: CPT

## 2023-07-05 PROCEDURE — 70450 CT HEAD/BRAIN W/O DYE: CPT

## 2023-07-05 RX ORDER — METOPROLOL SUCCINATE 50 MG/1
50 TABLET, EXTENDED RELEASE ORAL EVERY MORNING
Status: DISCONTINUED | OUTPATIENT
Start: 2023-07-06 | End: 2023-07-07 | Stop reason: HOSPADM

## 2023-07-05 RX ORDER — ONDANSETRON 2 MG/ML
4 INJECTION INTRAMUSCULAR; INTRAVENOUS EVERY 6 HOURS PRN
Status: DISCONTINUED | OUTPATIENT
Start: 2023-07-05 | End: 2023-07-07 | Stop reason: HOSPADM

## 2023-07-05 RX ORDER — SODIUM CHLORIDE 9 MG/ML
50 INJECTION, SOLUTION INTRAVENOUS ONCE
Status: DISCONTINUED | OUTPATIENT
Start: 2023-07-05 | End: 2023-07-05

## 2023-07-05 RX ORDER — ACETAMINOPHEN 500 MG
1000 TABLET ORAL ONCE
Status: COMPLETED | OUTPATIENT
Start: 2023-07-05 | End: 2023-07-05

## 2023-07-05 RX ORDER — OXYCODONE HYDROCHLORIDE 5 MG/1
5 TABLET ORAL EVERY 4 HOURS PRN
Status: DISCONTINUED | OUTPATIENT
Start: 2023-07-05 | End: 2023-07-07 | Stop reason: HOSPADM

## 2023-07-05 RX ORDER — ACETAMINOPHEN 325 MG/1
650 TABLET ORAL EVERY 6 HOURS
Status: DISCONTINUED | OUTPATIENT
Start: 2023-07-05 | End: 2023-07-07 | Stop reason: HOSPADM

## 2023-07-05 RX ORDER — LATANOPROST 50 UG/ML
1 SOLUTION/ DROPS OPHTHALMIC NIGHTLY
Status: DISCONTINUED | OUTPATIENT
Start: 2023-07-05 | End: 2023-07-07 | Stop reason: HOSPADM

## 2023-07-05 RX ORDER — METHOCARBAMOL 500 MG/1
1000 TABLET, FILM COATED ORAL 4 TIMES DAILY
Status: DISCONTINUED | OUTPATIENT
Start: 2023-07-05 | End: 2023-07-07 | Stop reason: HOSPADM

## 2023-07-05 RX ORDER — SODIUM CHLORIDE 9 MG/ML
INJECTION, SOLUTION INTRAVENOUS PRN
Status: DISCONTINUED | OUTPATIENT
Start: 2023-07-05 | End: 2023-07-07 | Stop reason: HOSPADM

## 2023-07-05 RX ORDER — SENNA AND DOCUSATE SODIUM 50; 8.6 MG/1; MG/1
1 TABLET, FILM COATED ORAL 2 TIMES DAILY
Status: DISCONTINUED | OUTPATIENT
Start: 2023-07-05 | End: 2023-07-07 | Stop reason: HOSPADM

## 2023-07-05 RX ORDER — POLYETHYLENE GLYCOL 3350 17 G/17G
17 POWDER, FOR SOLUTION ORAL DAILY
Status: DISCONTINUED | OUTPATIENT
Start: 2023-07-06 | End: 2023-07-07 | Stop reason: HOSPADM

## 2023-07-05 RX ORDER — OXYCODONE HYDROCHLORIDE 5 MG/1
2.5 TABLET ORAL EVERY 4 HOURS PRN
Status: DISCONTINUED | OUTPATIENT
Start: 2023-07-05 | End: 2023-07-07 | Stop reason: HOSPADM

## 2023-07-05 RX ORDER — ATORVASTATIN CALCIUM 10 MG/1
20 TABLET, FILM COATED ORAL DAILY
Status: DISCONTINUED | OUTPATIENT
Start: 2023-07-06 | End: 2023-07-07 | Stop reason: HOSPADM

## 2023-07-05 RX ORDER — SODIUM CHLORIDE 0.9 % (FLUSH) 0.9 %
10 SYRINGE (ML) INJECTION EVERY 12 HOURS SCHEDULED
Status: DISCONTINUED | OUTPATIENT
Start: 2023-07-05 | End: 2023-07-07 | Stop reason: HOSPADM

## 2023-07-05 RX ORDER — SODIUM CHLORIDE 0.9 % (FLUSH) 0.9 %
10 SYRINGE (ML) INJECTION PRN
Status: DISCONTINUED | OUTPATIENT
Start: 2023-07-05 | End: 2023-07-07 | Stop reason: HOSPADM

## 2023-07-05 RX ORDER — FENTANYL CITRATE 50 UG/ML
25 INJECTION, SOLUTION INTRAMUSCULAR; INTRAVENOUS ONCE
Status: COMPLETED | OUTPATIENT
Start: 2023-07-05 | End: 2023-07-05

## 2023-07-05 RX ORDER — ONDANSETRON 4 MG/1
4 TABLET, ORALLY DISINTEGRATING ORAL EVERY 8 HOURS PRN
Status: DISCONTINUED | OUTPATIENT
Start: 2023-07-05 | End: 2023-07-07 | Stop reason: HOSPADM

## 2023-07-05 RX ORDER — HYDROCODONE BITARTRATE AND ACETAMINOPHEN 5; 325 MG/1; MG/1
1 TABLET ORAL ONCE
Status: COMPLETED | OUTPATIENT
Start: 2023-07-05 | End: 2023-07-05

## 2023-07-05 RX ADMIN — SODIUM CHLORIDE, PRESERVATIVE FREE 10 ML: 5 INJECTION INTRAVENOUS at 23:07

## 2023-07-05 RX ADMIN — ONDANSETRON 4 MG: 4 TABLET, ORALLY DISINTEGRATING ORAL at 23:26

## 2023-07-05 RX ADMIN — METHOCARBAMOL 1000 MG: 500 TABLET ORAL at 23:08

## 2023-07-05 RX ADMIN — FENTANYL CITRATE 25 MCG: 50 INJECTION INTRAMUSCULAR; INTRAVENOUS at 18:09

## 2023-07-05 RX ADMIN — OXYCODONE HYDROCHLORIDE 5 MG: 5 TABLET ORAL at 23:07

## 2023-07-05 RX ADMIN — ACETAMINOPHEN 1000 MG: 500 TABLET ORAL at 12:39

## 2023-07-05 RX ADMIN — PHYTONADIONE 10 MG: 10 INJECTION, EMULSION INTRAMUSCULAR; INTRAVENOUS; SUBCUTANEOUS at 17:33

## 2023-07-05 RX ADMIN — HYDROCODONE BITARTRATE AND ACETAMINOPHEN 1 TABLET: 5; 325 TABLET ORAL at 15:08

## 2023-07-05 RX ADMIN — IOPAMIDOL 75 ML: 755 INJECTION, SOLUTION INTRAVENOUS at 16:06

## 2023-07-05 RX ADMIN — SENNOSIDES AND DOCUSATE SODIUM 1 TABLET: 50; 8.6 TABLET ORAL at 23:08

## 2023-07-05 ASSESSMENT — PAIN SCALES - GENERAL
PAINLEVEL_OUTOF10: 5
PAINLEVEL_OUTOF10: 5
PAINLEVEL_OUTOF10: 10

## 2023-07-05 ASSESSMENT — PAIN DESCRIPTION - FREQUENCY: FREQUENCY: CONTINUOUS

## 2023-07-05 ASSESSMENT — PAIN DESCRIPTION - ORIENTATION
ORIENTATION: MID

## 2023-07-05 ASSESSMENT — PAIN - FUNCTIONAL ASSESSMENT
PAIN_FUNCTIONAL_ASSESSMENT: ACTIVITIES ARE NOT PREVENTED
PAIN_FUNCTIONAL_ASSESSMENT: 0-10
PAIN_FUNCTIONAL_ASSESSMENT: ACTIVITIES ARE NOT PREVENTED

## 2023-07-05 ASSESSMENT — LIFESTYLE VARIABLES
HOW OFTEN DO YOU HAVE A DRINK CONTAINING ALCOHOL: NEVER
HOW MANY STANDARD DRINKS CONTAINING ALCOHOL DO YOU HAVE ON A TYPICAL DAY: PATIENT DOES NOT DRINK

## 2023-07-05 ASSESSMENT — PAIN DESCRIPTION - DESCRIPTORS
DESCRIPTORS: PRESSURE;ACHING
DESCRIPTORS: ACHING;SORE
DESCRIPTORS: SORE;ACHING

## 2023-07-05 ASSESSMENT — PAIN DESCRIPTION - LOCATION
LOCATION: CHEST
LOCATION: CHEST
LOCATION: RIB CAGE
LOCATION: MEDIASTINUM;CHEST

## 2023-07-05 NOTE — ED NOTES
Report called to [facility] [place] ED     Noble Mckeon RN  07/05/23 9910
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
## 2023-07-06 LAB
ANION GAP SERPL CALCULATED.3IONS-SCNC: 11 MMOL/L (ref 7–16)
BASOPHILS # BLD: 0.04 E9/L (ref 0–0.2)
BASOPHILS NFR BLD: 0.6 % (ref 0–2)
BUN SERPL-MCNC: 11 MG/DL (ref 6–23)
CALCIUM SERPL-MCNC: 8.3 MG/DL (ref 8.6–10.2)
CHLORIDE SERPL-SCNC: 103 MMOL/L (ref 98–107)
CO2 SERPL-SCNC: 24 MMOL/L (ref 22–29)
CREAT SERPL-MCNC: 0.6 MG/DL (ref 0.5–1)
EKG ATRIAL RATE: 72 BPM
EKG P AXIS: 45 DEGREES
EKG P-R INTERVAL: 134 MS
EKG Q-T INTERVAL: 442 MS
EKG QRS DURATION: 100 MS
EKG QTC CALCULATION (BAZETT): 483 MS
EKG R AXIS: -7 DEGREES
EKG T AXIS: 20 DEGREES
EKG VENTRICULAR RATE: 72 BPM
EOSINOPHIL # BLD: 0.01 E9/L (ref 0.05–0.5)
EOSINOPHIL NFR BLD: 0.2 % (ref 0–6)
ERYTHROCYTE [DISTWIDTH] IN BLOOD BY AUTOMATED COUNT: 14.2 FL (ref 11.5–15)
GLUCOSE SERPL-MCNC: 95 MG/DL (ref 74–99)
HCT VFR BLD AUTO: 34.8 % (ref 34–48)
HGB BLD-MCNC: 11.3 G/DL (ref 11.5–15.5)
IMM GRANULOCYTES # BLD: 0.02 E9/L
IMM GRANULOCYTES NFR BLD: 0.3 % (ref 0–5)
INR BLD: 1.6
LYMPHOCYTES # BLD: 1.2 E9/L (ref 1.5–4)
LYMPHOCYTES NFR BLD: 19.4 % (ref 20–42)
MCH RBC QN AUTO: 28.2 PG (ref 26–35)
MCHC RBC AUTO-ENTMCNC: 32.5 % (ref 32–34.5)
MCV RBC AUTO: 86.8 FL (ref 80–99.9)
MONOCYTES # BLD: 0.54 E9/L (ref 0.1–0.95)
MONOCYTES NFR BLD: 8.7 % (ref 2–12)
NEUTROPHILS # BLD: 4.37 E9/L (ref 1.8–7.3)
NEUTS SEG NFR BLD: 70.8 % (ref 43–80)
PLATELET # BLD AUTO: 163 E9/L (ref 130–450)
PMV BLD AUTO: 9.3 FL (ref 7–12)
POTASSIUM SERPL-SCNC: 3.8 MMOL/L (ref 3.5–5)
PROTHROMBIN TIME: 17.3 SEC (ref 9.3–12.4)
RBC # BLD AUTO: 4.01 E12/L (ref 3.5–5.5)
SODIUM SERPL-SCNC: 138 MMOL/L (ref 132–146)
WBC # BLD: 6.2 E9/L (ref 4.5–11.5)

## 2023-07-06 PROCEDURE — 6370000000 HC RX 637 (ALT 250 FOR IP)

## 2023-07-06 PROCEDURE — 97535 SELF CARE MNGMENT TRAINING: CPT

## 2023-07-06 PROCEDURE — 36415 COLL VENOUS BLD VENIPUNCTURE: CPT

## 2023-07-06 PROCEDURE — 93010 ELECTROCARDIOGRAM REPORT: CPT | Performed by: INTERNAL MEDICINE

## 2023-07-06 PROCEDURE — 97165 OT EVAL LOW COMPLEX 30 MIN: CPT

## 2023-07-06 PROCEDURE — 6370000000 HC RX 637 (ALT 250 FOR IP): Performed by: SURGERY

## 2023-07-06 PROCEDURE — 97161 PT EVAL LOW COMPLEX 20 MIN: CPT

## 2023-07-06 PROCEDURE — 2580000003 HC RX 258

## 2023-07-06 PROCEDURE — 6360000002 HC RX W HCPCS

## 2023-07-06 PROCEDURE — 2060000000 HC ICU INTERMEDIATE R&B

## 2023-07-06 PROCEDURE — 80048 BASIC METABOLIC PNL TOTAL CA: CPT

## 2023-07-06 PROCEDURE — 97530 THERAPEUTIC ACTIVITIES: CPT

## 2023-07-06 PROCEDURE — 6360000002 HC RX W HCPCS: Performed by: STUDENT IN AN ORGANIZED HEALTH CARE EDUCATION/TRAINING PROGRAM

## 2023-07-06 PROCEDURE — 85025 COMPLETE CBC W/AUTO DIFF WBC: CPT

## 2023-07-06 PROCEDURE — 85610 PROTHROMBIN TIME: CPT

## 2023-07-06 RX ORDER — LIDOCAINE 4 G/G
1 PATCH TOPICAL DAILY
Status: DISCONTINUED | OUTPATIENT
Start: 2023-07-06 | End: 2023-07-07 | Stop reason: HOSPADM

## 2023-07-06 RX ORDER — HEPARIN SODIUM 10000 [USP'U]/ML
5000 INJECTION, SOLUTION INTRAVENOUS; SUBCUTANEOUS EVERY 8 HOURS SCHEDULED
Status: DISCONTINUED | OUTPATIENT
Start: 2023-07-06 | End: 2023-07-07 | Stop reason: HOSPADM

## 2023-07-06 RX ADMIN — METHOCARBAMOL 1000 MG: 500 TABLET ORAL at 08:40

## 2023-07-06 RX ADMIN — OXYCODONE HYDROCHLORIDE 5 MG: 5 TABLET ORAL at 08:40

## 2023-07-06 RX ADMIN — OXYCODONE HYDROCHLORIDE 5 MG: 5 TABLET ORAL at 04:36

## 2023-07-06 RX ADMIN — METOPROLOL SUCCINATE 50 MG: 50 TABLET, EXTENDED RELEASE ORAL at 08:40

## 2023-07-06 RX ADMIN — ACETAMINOPHEN 650 MG: 325 TABLET ORAL at 16:55

## 2023-07-06 RX ADMIN — ATORVASTATIN CALCIUM 20 MG: 10 TABLET, FILM COATED ORAL at 08:41

## 2023-07-06 RX ADMIN — METHOCARBAMOL 1000 MG: 500 TABLET ORAL at 21:49

## 2023-07-06 RX ADMIN — ACETAMINOPHEN 650 MG: 325 TABLET ORAL at 21:49

## 2023-07-06 RX ADMIN — SODIUM CHLORIDE, PRESERVATIVE FREE 10 ML: 5 INJECTION INTRAVENOUS at 21:50

## 2023-07-06 RX ADMIN — HEPARIN SODIUM 5000 UNITS: 10000 INJECTION INTRAVENOUS; SUBCUTANEOUS at 21:49

## 2023-07-06 RX ADMIN — SENNOSIDES AND DOCUSATE SODIUM 1 TABLET: 50; 8.6 TABLET ORAL at 21:50

## 2023-07-06 RX ADMIN — SENNOSIDES AND DOCUSATE SODIUM 1 TABLET: 50; 8.6 TABLET ORAL at 08:40

## 2023-07-06 RX ADMIN — OXYCODONE HYDROCHLORIDE 5 MG: 5 TABLET ORAL at 21:47

## 2023-07-06 RX ADMIN — ONDANSETRON 4 MG: 2 INJECTION INTRAMUSCULAR; INTRAVENOUS at 09:57

## 2023-07-06 RX ADMIN — ACETAMINOPHEN 650 MG: 325 TABLET ORAL at 03:20

## 2023-07-06 RX ADMIN — METHOCARBAMOL 1000 MG: 500 TABLET ORAL at 16:55

## 2023-07-06 RX ADMIN — METHOCARBAMOL 1000 MG: 500 TABLET ORAL at 13:12

## 2023-07-06 RX ADMIN — OXYCODONE HYDROCHLORIDE 5 MG: 5 TABLET ORAL at 14:13

## 2023-07-06 RX ADMIN — SODIUM CHLORIDE, PRESERVATIVE FREE 10 ML: 5 INJECTION INTRAVENOUS at 08:43

## 2023-07-06 RX ADMIN — POLYETHYLENE GLYCOL 3350 17 G: 17 POWDER, FOR SOLUTION ORAL at 08:41

## 2023-07-06 ASSESSMENT — PAIN DESCRIPTION - DESCRIPTORS
DESCRIPTORS: ACHING;SORE;DISCOMFORT
DESCRIPTORS: ACHING;DISCOMFORT;SORE
DESCRIPTORS: ACHING;SORE;DISCOMFORT
DESCRIPTORS: ACHING;DISCOMFORT;SORE
DESCRIPTORS: ACHING;DISCOMFORT;SORE
DESCRIPTORS: ACHING;SORE
DESCRIPTORS: ACHING

## 2023-07-06 ASSESSMENT — PAIN DESCRIPTION - LOCATION
LOCATION: CHEST;MEDIASTINUM
LOCATION: CHEST;MEDIASTINUM
LOCATION: CHEST
LOCATION: CHEST;STERNUM
LOCATION: CHEST

## 2023-07-06 ASSESSMENT — PAIN SCALES - GENERAL
PAINLEVEL_OUTOF10: 8
PAINLEVEL_OUTOF10: 4
PAINLEVEL_OUTOF10: 7
PAINLEVEL_OUTOF10: 6
PAINLEVEL_OUTOF10: 8
PAINLEVEL_OUTOF10: 2
PAINLEVEL_OUTOF10: 8
PAINLEVEL_OUTOF10: 0
PAINLEVEL_OUTOF10: 10
PAINLEVEL_OUTOF10: 8
PAINLEVEL_OUTOF10: 3

## 2023-07-06 ASSESSMENT — PAIN DESCRIPTION - ORIENTATION
ORIENTATION: MID
ORIENTATION: POSTERIOR

## 2023-07-06 ASSESSMENT — PAIN - FUNCTIONAL ASSESSMENT
PAIN_FUNCTIONAL_ASSESSMENT: ACTIVITIES ARE NOT PREVENTED
PAIN_FUNCTIONAL_ASSESSMENT: ACTIVITIES ARE NOT PREVENTED

## 2023-07-06 ASSESSMENT — PAIN SCALES - WONG BAKER: WONGBAKER_NUMERICALRESPONSE: 0

## 2023-07-06 NOTE — ACP (ADVANCE CARE PLANNING)
Advance Care Planning   Healthcare Decision Maker:    Primary Decision Maker: David Bryant Child - [phone]    Secondary Decision Maker: Freida Sandoval - Child - [phone]    Click here to complete Healthcare Decision Makers including selection of the Healthcare Decision Maker Relationship (ie \"Primary\").

## 2023-07-06 NOTE — DISCHARGE INSTRUCTIONS
and go to all appointments, and call your doctor or nurse call line if you are having problems. It's also a good idea to know your test results and keep a list of the medicines you take. How can you care for yourself at home? Taking care of yourself  You may get dizzy if you do not drink enough water. To prevent dehydration, drink plenty of fluids, enough so that your urine is light yellow or clear like water. Choose water and other caffeine-free clear liquids. If you have kidney, heart, or liver disease and have to limit fluids, talk with your doctor before you increase the amount of fluids you drink. Exercise regularly to improve your strength, muscle tone, and balance. Walk if you can. Swimming may be a good choice if you cannot walk easily. Have your vision and hearing checked each year or any time you notice a change. If you have trouble seeing and hearing, you might not be able to avoid objects and could lose your balance. Know the side effects of the medicines you take. Ask your doctor or pharmacist whether the medicines you take can affect your balance. Sleeping pills or sedatives can affect your balance. Limit the amount of alcohol you drink. Alcohol can impair your balance and other senses. Ask your doctor whether calluses or corns on your feet need to be removed. If you wear loose-fitting shoes because of calluses or corns, you can lose your balance and fall. Talk to your doctor if you have numbness in your feet. Preventing falls at home  Remove raised doorway thresholds, throw rugs, and clutter. Repair loose carpet or raised areas in the floor. Move furniture and electrical cords to keep them out of walking paths. Use non-skid floor wax, and wipe up spills right away, especially on ceramic tile floors. If you use a walker or cane, put rubber tips on it. If you use crutches, clean the bottoms of them regularly with an abrasive pad, such as steel wool.   Keep your house well lit, especially
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
## 2023-07-07 VITALS
BODY MASS INDEX: 26.37 KG/M2 | WEIGHT: 130.79 LBS | DIASTOLIC BLOOD PRESSURE: 60 MMHG | OXYGEN SATURATION: 93 % | SYSTOLIC BLOOD PRESSURE: 134 MMHG | TEMPERATURE: 98.2 F | RESPIRATION RATE: 18 BRPM | HEART RATE: 74 BPM | HEIGHT: 59 IN

## 2023-07-07 LAB
ANION GAP SERPL CALCULATED.3IONS-SCNC: 11 MMOL/L (ref 7–16)
BASOPHILS # BLD: 0.03 E9/L (ref 0–0.2)
BASOPHILS NFR BLD: 0.5 % (ref 0–2)
BUN SERPL-MCNC: 10 MG/DL (ref 6–23)
CALCIUM SERPL-MCNC: 8.5 MG/DL (ref 8.6–10.2)
CHLORIDE SERPL-SCNC: 102 MMOL/L (ref 98–107)
CO2 SERPL-SCNC: 25 MMOL/L (ref 22–29)
CREAT SERPL-MCNC: 0.5 MG/DL (ref 0.5–1)
EOSINOPHIL # BLD: 0.08 E9/L (ref 0.05–0.5)
EOSINOPHIL NFR BLD: 1.2 % (ref 0–6)
ERYTHROCYTE [DISTWIDTH] IN BLOOD BY AUTOMATED COUNT: 13.9 FL (ref 11.5–15)
GLUCOSE SERPL-MCNC: 101 MG/DL (ref 74–99)
HCT VFR BLD AUTO: 37.8 % (ref 34–48)
HGB BLD-MCNC: 12.1 G/DL (ref 11.5–15.5)
IMM GRANULOCYTES # BLD: 0.01 E9/L
IMM GRANULOCYTES NFR BLD: 0.2 % (ref 0–5)
INR BLD: 1.2
LYMPHOCYTES # BLD: 1.25 E9/L (ref 1.5–4)
LYMPHOCYTES NFR BLD: 19.1 % (ref 20–42)
MCH RBC QN AUTO: 28.1 PG (ref 26–35)
MCHC RBC AUTO-ENTMCNC: 32 % (ref 32–34.5)
MCV RBC AUTO: 87.7 FL (ref 80–99.9)
MONOCYTES # BLD: 0.57 E9/L (ref 0.1–0.95)
MONOCYTES NFR BLD: 8.7 % (ref 2–12)
NEUTROPHILS # BLD: 4.61 E9/L (ref 1.8–7.3)
NEUTS SEG NFR BLD: 70.3 % (ref 43–80)
PLATELET # BLD AUTO: 174 E9/L (ref 130–450)
PMV BLD AUTO: 9.2 FL (ref 7–12)
POTASSIUM SERPL-SCNC: 3.9 MMOL/L (ref 3.5–5)
PROTHROMBIN TIME: 12.7 SEC (ref 9.3–12.4)
RBC # BLD AUTO: 4.31 E12/L (ref 3.5–5.5)
SODIUM SERPL-SCNC: 138 MMOL/L (ref 132–146)
WBC # BLD: 6.6 E9/L (ref 4.5–11.5)

## 2023-07-07 PROCEDURE — 6370000000 HC RX 637 (ALT 250 FOR IP)

## 2023-07-07 PROCEDURE — 97530 THERAPEUTIC ACTIVITIES: CPT

## 2023-07-07 PROCEDURE — 97535 SELF CARE MNGMENT TRAINING: CPT

## 2023-07-07 PROCEDURE — 36415 COLL VENOUS BLD VENIPUNCTURE: CPT

## 2023-07-07 PROCEDURE — 6360000002 HC RX W HCPCS: Performed by: STUDENT IN AN ORGANIZED HEALTH CARE EDUCATION/TRAINING PROGRAM

## 2023-07-07 PROCEDURE — 85025 COMPLETE CBC W/AUTO DIFF WBC: CPT

## 2023-07-07 PROCEDURE — 6370000000 HC RX 637 (ALT 250 FOR IP): Performed by: SURGERY

## 2023-07-07 PROCEDURE — 80048 BASIC METABOLIC PNL TOTAL CA: CPT

## 2023-07-07 PROCEDURE — 85610 PROTHROMBIN TIME: CPT

## 2023-07-07 RX ORDER — WARFARIN SODIUM 4 MG/1
4 TABLET ORAL
Status: COMPLETED | OUTPATIENT
Start: 2023-07-07 | End: 2023-07-07

## 2023-07-07 RX ORDER — POLYETHYLENE GLYCOL 3350 17 G/17G
17 POWDER, FOR SOLUTION ORAL DAILY
Qty: 510 G | Refills: 0 | Status: SHIPPED | OUTPATIENT
Start: 2023-07-07 | End: 2023-08-06

## 2023-07-07 RX ORDER — OXYCODONE HYDROCHLORIDE 5 MG/1
5 TABLET ORAL EVERY 6 HOURS PRN
Qty: 20 TABLET | Refills: 0 | Status: SHIPPED | OUTPATIENT
Start: 2023-07-07 | End: 2023-07-12

## 2023-07-07 RX ADMIN — HEPARIN SODIUM 5000 UNITS: 10000 INJECTION INTRAVENOUS; SUBCUTANEOUS at 05:59

## 2023-07-07 RX ADMIN — SENNOSIDES AND DOCUSATE SODIUM 1 TABLET: 50; 8.6 TABLET ORAL at 08:00

## 2023-07-07 RX ADMIN — POLYETHYLENE GLYCOL 3350 17 G: 17 POWDER, FOR SOLUTION ORAL at 08:00

## 2023-07-07 RX ADMIN — METHOCARBAMOL 1000 MG: 500 TABLET ORAL at 07:59

## 2023-07-07 RX ADMIN — OXYCODONE HYDROCHLORIDE 5 MG: 5 TABLET ORAL at 10:23

## 2023-07-07 RX ADMIN — WARFARIN SODIUM 4 MG: 4 TABLET ORAL at 11:21

## 2023-07-07 RX ADMIN — METHOCARBAMOL 1000 MG: 500 TABLET ORAL at 13:36

## 2023-07-07 RX ADMIN — OXYCODONE HYDROCHLORIDE 5 MG: 5 TABLET ORAL at 05:47

## 2023-07-07 RX ADMIN — ATORVASTATIN CALCIUM 20 MG: 10 TABLET, FILM COATED ORAL at 07:57

## 2023-07-07 RX ADMIN — ACETAMINOPHEN 650 MG: 325 TABLET ORAL at 14:15

## 2023-07-07 RX ADMIN — METOPROLOL SUCCINATE 50 MG: 50 TABLET, EXTENDED RELEASE ORAL at 07:59

## 2023-07-07 RX ADMIN — HEPARIN SODIUM 5000 UNITS: 10000 INJECTION INTRAVENOUS; SUBCUTANEOUS at 13:37

## 2023-07-07 ASSESSMENT — PAIN SCALES - GENERAL
PAINLEVEL_OUTOF10: 7
PAINLEVEL_OUTOF10: 6
PAINLEVEL_OUTOF10: 8
PAINLEVEL_OUTOF10: 7
PAINLEVEL_OUTOF10: 6

## 2023-07-07 ASSESSMENT — PAIN DESCRIPTION - DESCRIPTORS
DESCRIPTORS: ACHING;DISCOMFORT
DESCRIPTORS: SORE
DESCRIPTORS: SORE;ACHING
DESCRIPTORS: SORE
DESCRIPTORS: ACHING;SORE

## 2023-07-07 ASSESSMENT — PAIN DESCRIPTION - LOCATION
LOCATION: STERNUM
LOCATION: MEDIASTINUM;CHEST

## 2023-07-07 ASSESSMENT — PAIN DESCRIPTION - ORIENTATION
ORIENTATION: MID

## 2023-07-07 NOTE — DISCHARGE SUMMARY
urine is light yellow or clear like water. Choose water and other caffeine-free clear liquids. If you have kidney, heart, or liver disease and have to limit fluids, talk with your doctor before you increase the amount of fluids you drink. Exercise regularly to improve your strength, muscle tone, and balance. Walk if you can. Swimming may be a good choice if you cannot walk easily. Have your vision and hearing checked each year or any time you notice a change. If you have trouble seeing and hearing, you might not be able to avoid objects and could lose your balance. Know the side effects of the medicines you take. Ask your doctor or pharmacist whether the medicines you take can affect your balance. Sleeping pills or sedatives can affect your balance. Limit the amount of alcohol you drink. Alcohol can impair your balance and other senses. Ask your doctor whether calluses or corns on your feet need to be removed. If you wear loose-fitting shoes because of calluses or corns, you can lose your balance and fall. Talk to your doctor if you have numbness in your feet. Preventing falls at home  Remove raised doorway thresholds, throw rugs, and clutter. Repair loose carpet or raised areas in the floor. Move furniture and electrical cords to keep them out of walking paths. Use non-skid floor wax, and wipe up spills right away, especially on ceramic tile floors. If you use a walker or cane, put rubber tips on it. If you use crutches, clean the bottoms of them regularly with an abrasive pad, such as steel wool. Keep your house well lit, especially stairways, porches, and outside walkways. Use night-lights in areas such as hallways and washrooms. Add extra light switches or use remote switches (such as switches that go on or off when you clap your hands) to make it easier to turn lights on if you have to get up during the night. Install sturdy handrails on stairways.   Move items in your cabinets so that the things

## 2023-07-07 NOTE — CARE COORDINATION
Chart reviewed and case reviewed in IDR. Discharge orders received and patient can transition to home this afternoon. Spoke with [employer] Nguy[employer]n Douglas with [facility] and they will follow-up with the patient to get her set up. Met with the patient at the bedside to discuss above. She is in agreement. Also discussed possible need for 3 in 1 commode to use arms over her toilet and patient would like this. Patient would like [facility] to be delivered to the room. Call collette to Willard Potts, liaison with [facility] and referral made. Orders received. Patient's son to transport her home. Will continue to follow. Justina Botello RN.  P:  [phone]      The Plan for Transition of Care is related to the following treatment goals: equipment for transition to home. The Patient and/or patient representative was provided with a choice of provider and agrees with the discharge plan. [x] Yes [] No    Freedom of choice list was provided with basic dialogue that supports the patient's individualized plan of care/goals, treatment preferences and shares the quality data associated with the providers.  [x] Yes [] No

## 2023-07-07 NOTE — PLAN OF CARE
Problem: Discharge Planning  Goal: Discharge to home or other facility with appropriate resources  7/7/2023 1427 by Stevie Yoder RN  Outcome: Completed     Problem: Pain  Goal: Verbalizes/displays adequate comfort level or baseline comfort level  7/7/2023 1427 by Stevie Yoder RN  Outcome: Completed     Problem: Safety - Adult  Goal: Free from fall injury  7/7/2023 1427 by Stevie Yoder RN  Outcome: Completed     Problem: ABCDS Injury Assessment  Goal: Absence of physical injury  7/7/2023 1427 by Stevie Yoder RN  Outcome: Completed

## 2023-07-21 ENCOUNTER — OFFICE VISIT (OUTPATIENT)
Dept: SURGERY | Age: 83
End: 2023-07-21
Payer: MEDICARE

## 2023-07-21 VITALS
DIASTOLIC BLOOD PRESSURE: 75 MMHG | TEMPERATURE: 97.9 F | HEART RATE: 75 BPM | WEIGHT: 126.6 LBS | BODY MASS INDEX: 25.57 KG/M2 | OXYGEN SATURATION: 95 % | SYSTOLIC BLOOD PRESSURE: 149 MMHG

## 2023-07-21 DIAGNOSIS — S22.23XD CLOSED STERNAL MANUBRIAL DISSOCIATION FRACTURE WITH ROUTINE HEALING, SUBSEQUENT ENCOUNTER: Primary | ICD-10-CM

## 2023-07-21 PROCEDURE — 1123F ACP DISCUSS/DSCN MKR DOCD: CPT | Performed by: NURSE PRACTITIONER

## 2023-07-21 PROCEDURE — 99213 OFFICE O/P EST LOW 20 MIN: CPT | Performed by: NURSE PRACTITIONER

## 2023-07-21 ASSESSMENT — ENCOUNTER SYMPTOMS
COUGH: 0
SHORTNESS OF BREATH: 0
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
## 2023-07-21 NOTE — PATIENT INSTRUCTIONS
[facility]  Trauma Services  Additional Discharge Instructions    Call [phone] for any questions/concerns. Please follow the instructions checked below:  Please follow up with your primary care provider. ACTIVITY INSTRUCTIONS  Increase activity as tolerated  No heavy lifting or strenuous activity  Take your incentive spirometer home and use 4-6 times/day    WOUND/DRESSING INSTRUCTIONS:  You may shower. No sitting in bath tub, hot tub or swimming until cleared by physician. Ice to areas of pain for first 24 hours. Heat to areas of pain after that. MEDICATION INSTRUCTIONS  Take medication as prescribed. When taking pain medications, you may experience dizziness or drowsiness. Do not drink alcohol or drive when taking these medications. You may experience constipation while taking pain medication. You may take over the counter stool softeners such as docusate (Colace), sennosides S (Senokot-S), or Miralax. [x]  You may take Ibuprofen (over the counter) as directed for mild pain. You may take up to 800 mg every 8 hours for pain, please take with food or milk. [x]  You may take acetaminophen (Tylenol) products. Do NOT take more than 4000mg of Tylenol in 24h. CALL 911 OR YOUR LOCAL EMERGENCY SERVICE:  --If you take too much medication  --If you have trouble breathing or shortness of breath  --A child has taken this medication. RESOURCES:     [facility]:   https://aging. [place].gov/    For local services and supports for older adults and caregivers, call 2-986.382.6039 to be connected to the [facility] agency on aging serving your community. Monday through Friday 8 a.m. to 5 p.m    Call the trauma clinic for any of the following or for questions/concerns;  --fever over 101F  --redness, swelling, hardness or warmth at the wound site(s).   --Unrelieved nausea/vomiting  --Foul smelling or cloudy drainage at the wound site(s)  --Unrelieved pain

## 2023-08-30 DIAGNOSIS — M81.0 OSTEOPOROSIS, UNSPECIFIED OSTEOPOROSIS TYPE, UNSPECIFIED PATHOLOGICAL FRACTURE PRESENCE: ICD-10-CM

## 2023-09-11 ENCOUNTER — OFFICE VISIT (OUTPATIENT)
Dept: ENDOCRINOLOGY | Age: 83
End: 2023-09-11
Payer: MEDICARE

## 2023-09-11 VITALS
DIASTOLIC BLOOD PRESSURE: 76 MMHG | BODY MASS INDEX: 25.85 KG/M2 | HEART RATE: 62 BPM | SYSTOLIC BLOOD PRESSURE: 133 MMHG | WEIGHT: 128 LBS | OXYGEN SATURATION: 97 %

## 2023-09-11 DIAGNOSIS — M81.0 OSTEOPOROSIS, UNSPECIFIED OSTEOPOROSIS TYPE, UNSPECIFIED PATHOLOGICAL FRACTURE PRESENCE: Primary | ICD-10-CM

## 2023-09-11 PROCEDURE — 1123F ACP DISCUSS/DSCN MKR DOCD: CPT | Performed by: INTERNAL MEDICINE

## 2023-09-11 PROCEDURE — 99214 OFFICE O/P EST MOD 30 MIN: CPT | Performed by: INTERNAL MEDICINE
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
## 2023-09-19 ENCOUNTER — HOSPITAL ENCOUNTER (OUTPATIENT)
Age: 83
Discharge: HOME | End: 2023-09-19
Payer: MEDICARE

## 2023-09-19 DIAGNOSIS — M81.0 OSTEOPOROSIS, UNSPECIFIED OSTEOPOROSIS TYPE, UNSPECIFIED PATHOLOGICAL FRACTURE PRESENCE: ICD-10-CM

## 2023-09-19 LAB
25(OH)D3 SERPL-MCNC: 62.8 NG/ML (ref 30–100)
ANION GAP SERPL CALCULATED.3IONS-SCNC: 10 MMOL/L (ref 7–16)
BUN SERPL-MCNC: 18 MG/DL (ref 6–23)
CALCIUM SERPL-MCNC: 9.6 MG/DL (ref 8.6–10.2)
CHLORIDE SERPL-SCNC: 96 MMOL/L (ref 98–107)
CO2 SERPL-SCNC: 30 MMOL/L (ref 22–29)
CREAT SERPL-MCNC: 1 MG/DL (ref 0.5–1)
GFR SERPL CREATININE-BSD FRML MDRD: 54 ML/MIN/1.73M2
GLUCOSE SERPL-MCNC: 108 MG/DL (ref 74–99)
POTASSIUM SERPL-SCNC: 3.9 MMOL/L (ref 3.5–5)
SODIUM SERPL-SCNC: 136 MMOL/L (ref 132–146)

## 2023-09-19 PROCEDURE — 80048 BASIC METABOLIC PNL TOTAL CA: CPT

## 2023-09-19 PROCEDURE — 82306 VITAMIN D 25 HYDROXY: CPT

## 2023-09-27 ENCOUNTER — APPOINTMENT (OUTPATIENT)
Dept: CT IMAGING | Age: 83
End: 2023-09-27
Payer: MEDICARE

## 2023-09-27 ENCOUNTER — HOSPITAL ENCOUNTER (OUTPATIENT)
Dept: INFUSION THERAPY | Age: 83
Setting detail: INFUSION SERIES
End: 2023-09-27

## 2023-09-27 ENCOUNTER — APPOINTMENT (OUTPATIENT)
Dept: GENERAL RADIOLOGY | Age: 83
End: 2023-09-27
Payer: MEDICARE

## 2023-09-27 ENCOUNTER — HOSPITAL ENCOUNTER (EMERGENCY)
Age: 83
Discharge: HOME | End: 2023-09-27
Attending: EMERGENCY MEDICINE
Payer: MEDICARE

## 2023-09-27 VITALS
OXYGEN SATURATION: 97 % | HEIGHT: 59 IN | SYSTOLIC BLOOD PRESSURE: 119 MMHG | TEMPERATURE: 98.6 F | WEIGHT: 128 LBS | DIASTOLIC BLOOD PRESSURE: 51 MMHG | RESPIRATION RATE: 12 BRPM | BODY MASS INDEX: 25.8 KG/M2 | HEART RATE: 81 BPM

## 2023-09-27 DIAGNOSIS — R59.9 ADENOPATHY: ICD-10-CM

## 2023-09-27 DIAGNOSIS — R42 VERTIGO: Primary | ICD-10-CM

## 2023-09-27 LAB
ALBUMIN SERPL-MCNC: 3.8 G/DL (ref 3.5–5.2)
ALP SERPL-CCNC: 60 U/L (ref 35–104)
ALT SERPL-CCNC: 11 U/L (ref 0–32)
ANION GAP SERPL CALCULATED.3IONS-SCNC: 11 MMOL/L (ref 7–16)
AST SERPL-CCNC: 19 U/L (ref 0–31)
BASOPHILS # BLD: 0.02 K/UL (ref 0–0.2)
BASOPHILS NFR BLD: 0 % (ref 0–2)
BILIRUB SERPL-MCNC: 0.5 MG/DL (ref 0–1.2)
BUN SERPL-MCNC: 16 MG/DL (ref 6–23)
CALCIUM SERPL-MCNC: 8.7 MG/DL (ref 8.6–10.2)
CHLORIDE SERPL-SCNC: 99 MMOL/L (ref 98–107)
CHP ED QC CHECK: NORMAL
CO2 SERPL-SCNC: 26 MMOL/L (ref 22–29)
CREAT SERPL-MCNC: 0.6 MG/DL (ref 0.5–1)
EKG ATRIAL RATE: 95 BPM
EKG P AXIS: 39 DEGREES
EKG P-R INTERVAL: 154 MS
EKG Q-T INTERVAL: 366 MS
EKG QRS DURATION: 84 MS
EKG QTC CALCULATION (BAZETT): 459 MS
EKG T AXIS: 136 DEGREES
EKG VENTRICULAR RATE: 95 BPM
EOSINOPHIL # BLD: 0.05 K/UL (ref 0.05–0.5)
EOSINOPHILS RELATIVE PERCENT: 1 % (ref 0–6)
ERYTHROCYTE [DISTWIDTH] IN BLOOD BY AUTOMATED COUNT: 14.4 % (ref 11.5–15)
GFR SERPL CREATININE-BSD FRML MDRD: >60 ML/MIN/1.73M2
GLUCOSE BLD-MCNC: 133 MG/DL
GLUCOSE SERPL-MCNC: 133 MG/DL (ref 74–99)
HCT VFR BLD AUTO: 36.9 % (ref 34–48)
HGB BLD-MCNC: 12.2 G/DL (ref 11.5–15.5)
IMM GRANULOCYTES # BLD AUTO: <0.03 K/UL (ref 0–0.58)
IMM GRANULOCYTES NFR BLD: 0 % (ref 0–5)
LACTATE BLDV-SCNC: 1.6 MMOL/L (ref 0.5–2.2)
LYMPHOCYTES NFR BLD: 1.11 K/UL (ref 1.5–4)
LYMPHOCYTES RELATIVE PERCENT: 21 % (ref 20–42)
MCH RBC QN AUTO: 28.4 PG (ref 26–35)
MCHC RBC AUTO-ENTMCNC: 33.1 G/DL (ref 32–34.5)
MCV RBC AUTO: 85.8 FL (ref 80–99.9)
MONOCYTES NFR BLD: 0.35 K/UL (ref 0.1–0.95)
MONOCYTES NFR BLD: 7 % (ref 2–12)
NEUTROPHILS NFR BLD: 70 % (ref 43–80)
NEUTS SEG NFR BLD: 3.66 K/UL (ref 1.8–7.3)
PLATELET # BLD AUTO: 190 K/UL (ref 130–450)
PMV BLD AUTO: 9.3 FL (ref 7–12)
POTASSIUM SERPL-SCNC: 3.7 MMOL/L (ref 3.5–5)
PROT SERPL-MCNC: 6.8 G/DL (ref 6.4–8.3)
RBC # BLD AUTO: 4.3 M/UL (ref 3.5–5.5)
SODIUM SERPL-SCNC: 136 MMOL/L (ref 132–146)
WBC OTHER # BLD: 5.2 K/UL (ref 4.5–11.5)

## 2023-09-27 PROCEDURE — 6370000000 HC RX 637 (ALT 250 FOR IP)

## 2023-09-27 PROCEDURE — 96374 THER/PROPH/DIAG INJ IV PUSH: CPT

## 2023-09-27 PROCEDURE — 80053 COMPREHEN METABOLIC PANEL: CPT

## 2023-09-27 PROCEDURE — 93010 ELECTROCARDIOGRAM REPORT: CPT | Performed by: INTERNAL MEDICINE

## 2023-09-27 PROCEDURE — 83605 ASSAY OF LACTIC ACID: CPT

## 2023-09-27 PROCEDURE — 99285 EMERGENCY DEPT VISIT HI MDM: CPT

## 2023-09-27 PROCEDURE — 93005 ELECTROCARDIOGRAM TRACING: CPT

## 2023-09-27 PROCEDURE — 2580000003 HC RX 258

## 2023-09-27 PROCEDURE — 6360000002 HC RX W HCPCS

## 2023-09-27 PROCEDURE — 85025 COMPLETE CBC W/AUTO DIFF WBC: CPT

## 2023-09-27 PROCEDURE — 70450 CT HEAD/BRAIN W/O DYE: CPT

## 2023-09-27 PROCEDURE — 71045 X-RAY EXAM CHEST 1 VIEW: CPT

## 2023-09-27 RX ORDER — MECLIZINE HCL 12.5 MG/1
12.5 TABLET ORAL ONCE
Status: COMPLETED | OUTPATIENT
Start: 2023-09-27 | End: 2023-09-27

## 2023-09-27 RX ORDER — MECLIZINE HCL 12.5 MG/1
12.5 TABLET ORAL 3 TIMES DAILY PRN
Qty: 15 TABLET | Refills: 0 | Status: SHIPPED | OUTPATIENT
Start: 2023-09-27 | End: 2023-10-07

## 2023-09-27 RX ORDER — 0.9 % SODIUM CHLORIDE 0.9 %
1000 INTRAVENOUS SOLUTION INTRAVENOUS ONCE
Status: COMPLETED | OUTPATIENT
Start: 2023-09-27 | End: 2023-09-27

## 2023-09-27 RX ORDER — ONDANSETRON 2 MG/ML
4 INJECTION INTRAMUSCULAR; INTRAVENOUS ONCE
Status: COMPLETED | OUTPATIENT
Start: 2023-09-27 | End: 2023-09-27

## 2023-09-27 RX ADMIN — ONDANSETRON 4 MG: 2 INJECTION INTRAMUSCULAR; INTRAVENOUS at 07:11

## 2023-09-27 RX ADMIN — SODIUM CHLORIDE 1000 ML: 9 INJECTION, SOLUTION INTRAVENOUS at 07:12

## 2023-09-27 RX ADMIN — MECLIZINE 12.5 MG: 12.5 TABLET ORAL at 09:38

## 2023-09-29 ENCOUNTER — HOSPITAL ENCOUNTER (OUTPATIENT)
Dept: INFUSION THERAPY | Age: 83
Setting detail: INFUSION SERIES
Discharge: HOME | End: 2023-09-29
Payer: MEDICARE

## 2023-09-29 VITALS
HEART RATE: 60 BPM | WEIGHT: 128 LBS | BODY MASS INDEX: 25.8 KG/M2 | RESPIRATION RATE: 16 BRPM | HEIGHT: 59 IN | SYSTOLIC BLOOD PRESSURE: 140 MMHG | DIASTOLIC BLOOD PRESSURE: 60 MMHG | TEMPERATURE: 97.6 F

## 2023-09-29 DIAGNOSIS — M81.0 SENILE OSTEOPOROSIS: Primary | ICD-10-CM

## 2023-09-29 PROCEDURE — 96372 THER/PROPH/DIAG INJ SC/IM: CPT

## 2023-09-29 PROCEDURE — 6360000002 HC RX W HCPCS: Performed by: INTERNAL MEDICINE

## 2023-09-29 RX ADMIN — DENOSUMAB 60 MG: 60 INJECTION SUBCUTANEOUS at 08:45

## 2023-10-09 ENCOUNTER — HOSPITAL ENCOUNTER (OUTPATIENT)
Dept: CT IMAGING | Age: 83
Discharge: HOME | End: 2023-10-11
Payer: MEDICARE

## 2023-10-09 DIAGNOSIS — R59.0 LOCALIZED ENLARGED LYMPH NODES: ICD-10-CM

## 2023-10-09 PROCEDURE — 71260 CT THORAX DX C+: CPT

## 2023-10-09 RX ORDER — SODIUM CHLORIDE 0.9 % (FLUSH) 0.9 %
10 SYRINGE (ML) INJECTION PRN
Status: DISCONTINUED | OUTPATIENT
Start: 2023-10-09 | End: 2023-10-12 | Stop reason: HOSPADM

## 2024-03-15 ENCOUNTER — APPOINTMENT (OUTPATIENT)
Dept: CT IMAGING | Age: 84
End: 2024-03-15
Payer: MEDICARE

## 2024-03-15 ENCOUNTER — APPOINTMENT (OUTPATIENT)
Dept: GENERAL RADIOLOGY | Age: 84
End: 2024-03-15
Payer: MEDICARE

## 2024-03-15 ENCOUNTER — HOSPITAL ENCOUNTER (EMERGENCY)
Age: 84
Discharge: HOME | End: 2024-03-15
Payer: MEDICARE

## 2024-03-15 VITALS
OXYGEN SATURATION: 100 % | WEIGHT: 128 LBS | TEMPERATURE: 98.2 F | RESPIRATION RATE: 16 BRPM | DIASTOLIC BLOOD PRESSURE: 88 MMHG | SYSTOLIC BLOOD PRESSURE: 138 MMHG | HEART RATE: 68 BPM | BODY MASS INDEX: 25.85 KG/M2
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
## 2024-03-15 DIAGNOSIS — S01.01XA LACERATION OF SCALP, INITIAL ENCOUNTER: ICD-10-CM

## 2024-03-15 DIAGNOSIS — S09.90XA INJURY OF HEAD, INITIAL ENCOUNTER: Primary | ICD-10-CM

## 2024-03-15 LAB
ANION GAP SERPL CALCULATED.3IONS-SCNC: 10 MMOL/L (ref 7–16)
BUN SERPL-MCNC: 15 MG/DL (ref 6–23)
CALCIUM SERPL-MCNC: 9 MG/DL (ref 8.6–10.2)
CHLORIDE SERPL-SCNC: 99 MMOL/L (ref 98–107)
CO2 SERPL-SCNC: 27 MMOL/L (ref 22–29)
CREAT SERPL-MCNC: 0.6 MG/DL (ref 0.5–1)
ERYTHROCYTE [DISTWIDTH] IN BLOOD BY AUTOMATED COUNT: 14.4 % (ref 11.5–15)
GFR SERPL CREATININE-BSD FRML MDRD: >60 ML/MIN/1.73M2
GLUCOSE SERPL-MCNC: 105 MG/DL (ref 74–99)
HCT VFR BLD AUTO: 43.2 % (ref 34–48)
HGB BLD-MCNC: 13.8 G/DL (ref 11.5–15.5)
INR PPP: 3
MCH RBC QN AUTO: 27.9 PG (ref 26–35)
MCHC RBC AUTO-ENTMCNC: 31.9 G/DL (ref 32–34.5)
MCV RBC AUTO: 87.4 FL (ref 80–99.9)
PLATELET # BLD AUTO: 229 K/UL (ref 130–450)
PMV BLD AUTO: 8.9 FL (ref 7–12)
POTASSIUM SERPL-SCNC: 3.8 MMOL/L (ref 3.5–5)
PROTHROMBIN TIME: 33.8 SEC (ref 9.3–12.4)
RBC # BLD AUTO: 4.94 M/UL (ref 3.5–5.5)
SODIUM SERPL-SCNC: 136 MMOL/L (ref 132–146)
WBC OTHER # BLD: 8.5 K/UL (ref 4.5–11.5)

## 2024-03-15 PROCEDURE — 85610 PROTHROMBIN TIME: CPT

## 2024-03-15 PROCEDURE — 90714 TD VACC NO PRESV 7 YRS+ IM: CPT

## 2024-03-15 PROCEDURE — 12011 RPR F/E/E/N/L/M 2.5 CM/<: CPT

## 2024-03-15 PROCEDURE — 70450 CT HEAD/BRAIN W/O DYE: CPT

## 2024-03-15 PROCEDURE — 80048 BASIC METABOLIC PNL TOTAL CA: CPT

## 2024-03-15 PROCEDURE — 85027 COMPLETE CBC AUTOMATED: CPT

## 2024-03-15 PROCEDURE — 70486 CT MAXILLOFACIAL W/O DYE: CPT

## 2024-03-15 PROCEDURE — 6360000002 HC RX W HCPCS

## 2024-03-15 PROCEDURE — 73562 X-RAY EXAM OF KNEE 3: CPT

## 2024-03-15 PROCEDURE — 99284 EMERGENCY DEPT VISIT MOD MDM: CPT

## 2024-03-15 PROCEDURE — 90471 IMMUNIZATION ADMIN: CPT

## 2024-03-15 PROCEDURE — 72125 CT NECK SPINE W/O DYE: CPT

## 2024-03-15 RX ORDER — LIDOCAINE HYDROCHLORIDE AND EPINEPHRINE 10; 10 MG/ML; UG/ML
20 INJECTION, SOLUTION INFILTRATION; PERINEURAL ONCE
Status: DISCONTINUED | OUTPATIENT
Start: 2024-03-15 | End: 2024-03-15 | Stop reason: HOSPADM

## 2024-03-15 RX ADMIN — CLOSTRIDIUM TETANI TOXOID ANTIGEN (FORMALDEHYDE INACTIVATED) AND CORYNEBACTERIUM DIPHTHERIAE TOXOID ANTIGEN (FORMALDEHYDE INACTIVATED) 0.5 ML: 5; 2 INJECTION, SUSPENSION INTRAMUSCULAR at 19:57

## 2024-03-15 ASSESSMENT — PAIN - FUNCTIONAL ASSESSMENT: PAIN_FUNCTIONAL_ASSESSMENT: 0-10

## 2024-03-15 ASSESSMENT — PAIN SCALES - GENERAL: PAINLEVEL_OUTOF10: 5
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
## 2024-03-16 NOTE — ED PROVIDER NOTES
ALEXUS Hong NP reviewed today's visit with the patient and son in addition to providing specific details for the plan of care and counseling regarding the diagnosis and prognosis.  Questions are answered at this time and are agreeable with the plan.    Assessment      1. Injury of head, initial encounter    2. Laceration of scalp, initial encounter    3. Fall, initial encounter      Plan   Discharged home.  Patient condition is good    New Medications     Discharge Medication List as of 3/15/2024  8:32 PM        Electronically signed by ALEXUS Caruso NP   DD: 3/16/24  **This report was transcribed using voice recognition software. Every effort was made to ensure accuracy; however, inadvertent computerized transcription errors may be present.  END OF ED PROVIDER NOTE      Roque Phelan APRN - NP  03/16/24 0036

## 2024-03-16 NOTE — DISCHARGE INSTRUCTIONS
CT HEAD WO CONTRAST   Final Result   No acute intracranial abnormality.         CT CERVICAL SPINE WO CONTRAST   Final Result   No acute abnormality of the cervical spine.         CT FACIAL BONES WO CONTRAST   Final Result   No acute facial bone trauma.         XR KNEE RIGHT (3 VIEWS)   Final Result   No acute abnormality of the knee.      Early osteoarthritis.         Call Dr. Long on Monday to schedule a follow-up appointment.  You have 2 sutures on forehead above the right eyebrow that will need removed in about a week.  Please keep this area clean and dry while it is healing.  If you notice any surrounding redness, warmth, drainage, fever return to the emergency department for reevaluation for infection.  You can apply antibiotic ointment to sutures twice daily for the next week.  Your INR was within normal range at 3.0.  All of your other blood work was normal.  In case Dr. Long wants to know, we checked your PT/INR, CBC, BMP.

## 2024-03-27 ENCOUNTER — HOSPITAL ENCOUNTER (OUTPATIENT)
Age: 84
Discharge: HOME | End: 2024-03-27
Payer: MEDICARE

## 2024-03-27 ENCOUNTER — TELEPHONE (OUTPATIENT)
Dept: ADMINISTRATIVE | Age: 84
End: 2024-03-27

## 2024-03-27 DIAGNOSIS — M81.0 OSTEOPOROSIS, UNSPECIFIED OSTEOPOROSIS TYPE, UNSPECIFIED PATHOLOGICAL FRACTURE PRESENCE: Primary | ICD-10-CM

## 2024-03-27 DIAGNOSIS — M81.0 OSTEOPOROSIS, UNSPECIFIED OSTEOPOROSIS TYPE, UNSPECIFIED PATHOLOGICAL FRACTURE PRESENCE: ICD-10-CM

## 2024-03-27 LAB — 25(OH)D3 SERPL-MCNC: 58.9 NG/ML (ref 30–100)

## 2024-03-27 PROCEDURE — 82306 VITAMIN D 25 HYDROXY: CPT

## 2024-03-27 NOTE — TELEPHONE ENCOUNTER
[facility] called and said pt needs an order for blood work.  She is scheduled for her Prolia injection on 4/4 and needs blood work done 1 week prior.  She did have a metabolic panel done in ER on 3/15 but will need vitamin D level ordered.  Staff unavailable due to pt care.  Please add order in epic.  If any questions, please contact Benjy at [phone].  Also, the fax number is [phone].

## 2024-04-04 ENCOUNTER — HOSPITAL ENCOUNTER (OUTPATIENT)
Dept: INFUSION THERAPY | Age: 84
Setting detail: INFUSION SERIES
Discharge: HOME | End: 2024-04-04
Payer: MEDICARE

## 2024-04-04 VITALS
BODY MASS INDEX: 25.85 KG/M2 | TEMPERATURE: 97.5 F | OXYGEN SATURATION: 98 % | WEIGHT: 128 LBS | RESPIRATION RATE: 16 BRPM | HEART RATE: 65 BPM

## 2024-04-04 DIAGNOSIS — M81.0 SENILE OSTEOPOROSIS: Primary | ICD-10-CM

## 2024-04-04 PROCEDURE — 96372 THER/PROPH/DIAG INJ SC/IM: CPT

## 2024-04-04 PROCEDURE — 6360000002 HC RX W HCPCS: Performed by: INTERNAL MEDICINE

## 2024-04-04 RX ADMIN — DENOSUMAB 60 MG: 60 INJECTION SUBCUTANEOUS at 09:32

## 2024-04-04 ASSESSMENT — PAIN DESCRIPTION - FREQUENCY
FREQUENCY: INTERMITTENT
FREQUENCY: INTERMITTENT

## 2024-04-04 ASSESSMENT — PAIN DESCRIPTION - PAIN TYPE
TYPE: ACUTE PAIN
TYPE: ACUTE PAIN

## 2024-04-04 ASSESSMENT — PAIN DESCRIPTION - LOCATION: LOCATION: LEG;OTHER (COMMENT)

## 2024-04-04 ASSESSMENT — PAIN DESCRIPTION - DESCRIPTORS: DESCRIPTORS: ACHING

## 2024-04-04 ASSESSMENT — PAIN DESCRIPTION - ONSET
ONSET: ON-GOING
ONSET: ON-GOING

## 2024-04-04 ASSESSMENT — PAIN DESCRIPTION - ORIENTATION: ORIENTATION: RIGHT

## 2024-04-08 DIAGNOSIS — M81.0 OSTEOPOROSIS, UNSPECIFIED OSTEOPOROSIS TYPE, UNSPECIFIED PATHOLOGICAL FRACTURE PRESENCE: ICD-10-CM

## 2024-04-11 ENCOUNTER — HOSPITAL ENCOUNTER (OUTPATIENT)
Age: 84
Discharge: HOME | End: 2024-04-13
Payer: MEDICARE

## 2024-04-11 ENCOUNTER — HOSPITAL ENCOUNTER (OUTPATIENT)
Dept: GENERAL RADIOLOGY | Age: 84
Discharge: HOME | End: 2024-04-13
Payer: MEDICARE

## 2024-04-11 DIAGNOSIS — M25.571 PAIN IN RIGHT ANKLE AND JOINTS OF RIGHT FOOT: ICD-10-CM

## 2024-04-11 PROCEDURE — 73630 X-RAY EXAM OF FOOT: CPT

## 2024-04-11 PROCEDURE — 73610 X-RAY EXAM OF ANKLE: CPT

## 2024-09-12 ENCOUNTER — OFFICE VISIT (OUTPATIENT)
Dept: ENDOCRINOLOGY | Age: 84
End: 2024-09-12
Payer: MEDICARE

## 2024-09-12 VITALS
HEIGHT: 59 IN | OXYGEN SATURATION: 98 % | DIASTOLIC BLOOD PRESSURE: 76 MMHG | WEIGHT: 127 LBS | BODY MASS INDEX: 25.6 KG/M2 | HEART RATE: 68 BPM | SYSTOLIC BLOOD PRESSURE: 125 MMHG

## 2024-09-12 DIAGNOSIS — E55.9 VITAMIN D DEFICIENCY: ICD-10-CM

## 2024-09-12 DIAGNOSIS — M81.0 OSTEOPOROSIS, UNSPECIFIED OSTEOPOROSIS TYPE, UNSPECIFIED PATHOLOGICAL FRACTURE PRESENCE: Primary | ICD-10-CM

## 2024-09-12 PROCEDURE — 99214 OFFICE O/P EST MOD 30 MIN: CPT | Performed by: INTERNAL MEDICINE

## 2024-09-12 PROCEDURE — 1123F ACP DISCUSS/DSCN MKR DOCD: CPT | Performed by: INTERNAL MEDICINE

## 2024-10-01 ENCOUNTER — HOSPITAL ENCOUNTER (OUTPATIENT)
Age: 84
Discharge: HOME | End: 2024-10-01
Payer: MEDICARE

## 2024-10-01 DIAGNOSIS — M81.0 OSTEOPOROSIS, UNSPECIFIED OSTEOPOROSIS TYPE, UNSPECIFIED PATHOLOGICAL FRACTURE PRESENCE: ICD-10-CM

## 2024-10-01 DIAGNOSIS — E55.9 VITAMIN D DEFICIENCY: ICD-10-CM

## 2024-10-01 LAB
25(OH)D3 SERPL-MCNC: 61.1 NG/ML (ref 30–100)
ANION GAP SERPL CALCULATED.3IONS-SCNC: 8 MMOL/L (ref 7–16)
BUN SERPL-MCNC: 14 MG/DL (ref 6–23)
CALCIUM SERPL-MCNC: 9.4 MG/DL (ref 8.6–10.2)
CHLORIDE SERPL-SCNC: 100 MMOL/L (ref 98–107)
CO2 SERPL-SCNC: 29 MMOL/L (ref 22–29)
CREAT SERPL-MCNC: 0.6 MG/DL (ref 0.5–1)
GFR, ESTIMATED: 88 ML/MIN/1.73M2
GLUCOSE SERPL-MCNC: 121 MG/DL (ref 74–99)
POTASSIUM SERPL-SCNC: 4 MMOL/L (ref 3.5–5)
SODIUM SERPL-SCNC: 137 MMOL/L (ref 132–146)

## 2024-10-01 PROCEDURE — 36415 COLL VENOUS BLD VENIPUNCTURE: CPT

## 2024-10-01 PROCEDURE — 82306 VITAMIN D 25 HYDROXY: CPT

## 2024-10-01 PROCEDURE — 80048 BASIC METABOLIC PNL TOTAL CA: CPT

## 2024-10-01 NOTE — PROGRESS NOTES
Called and spoke with patient and instructed to have labs drawn before her appointment she said going today

## 2024-10-02 ENCOUNTER — TELEPHONE (OUTPATIENT)
Dept: ENDOCRINOLOGY | Age: 84
End: 2024-10-02

## 2024-10-02 NOTE — TELEPHONE ENCOUNTER
Notify patient  Calcium and vitamin D level are very good.  We will proceed with the Prolia injection as scheduled

## 2024-10-08 ENCOUNTER — HOSPITAL ENCOUNTER (OUTPATIENT)
Dept: INFUSION THERAPY | Age: 84
Setting detail: INFUSION SERIES
Discharge: HOME | End: 2024-10-08
Payer: MEDICARE

## 2024-10-08 VITALS
WEIGHT: 125 LBS | TEMPERATURE: 98 F | HEART RATE: 57 BPM | BODY MASS INDEX: 25.2 KG/M2 | SYSTOLIC BLOOD PRESSURE: 132 MMHG | OXYGEN SATURATION: 95 % | RESPIRATION RATE: 16 BRPM | DIASTOLIC BLOOD PRESSURE: 64 MMHG | HEIGHT: 59 IN

## 2024-10-08 DIAGNOSIS — M81.0 SENILE OSTEOPOROSIS: Primary | ICD-10-CM

## 2024-10-08 PROCEDURE — 6360000002 HC RX W HCPCS: Performed by: INTERNAL MEDICINE

## 2024-10-08 PROCEDURE — 96372 THER/PROPH/DIAG INJ SC/IM: CPT

## 2024-10-08 RX ADMIN — DENOSUMAB 60 MG: 60 INJECTION SUBCUTANEOUS at 09:37

## 2024-10-08 NOTE — PROGRESS NOTES
Tolerated injection well.  Reviewed therapy plan, offered education material and/or discharge material, reviewed medication information and signs and symptoms  and educated on possible side effects, verbalizes good knowledge of current plan patient verbalizes understanding, and has no signs or symptoms to report at this time.   Patient discharged. Patient alert and oriented x3.   No distress noted.   Vital signs stable.   Patient denies any new or worsening pain.  Patient denies any needs.  All questions answered.  Next appointment scheduled.declines copy of AVS. Instructed on lab draw for next injection.

## 2024-11-20 ENCOUNTER — HOSPITAL ENCOUNTER (OUTPATIENT)
Age: 84
Setting detail: OBSERVATION
Discharge: HOME | End: 2024-11-22
Attending: EMERGENCY MEDICINE | Admitting: INTERNAL MEDICINE
Payer: MEDICARE

## 2024-11-20 ENCOUNTER — APPOINTMENT (OUTPATIENT)
Dept: CT IMAGING | Age: 84
End: 2024-11-20
Attending: EMERGENCY MEDICINE
Payer: MEDICARE

## 2024-11-20 ENCOUNTER — APPOINTMENT (OUTPATIENT)
Dept: ULTRASOUND IMAGING | Age: 84
End: 2024-11-20
Payer: MEDICARE

## 2024-11-20 ENCOUNTER — APPOINTMENT (OUTPATIENT)
Dept: GENERAL RADIOLOGY | Age: 84
End: 2024-11-20
Payer: MEDICARE

## 2024-11-20 DIAGNOSIS — G45.9 TIA (TRANSIENT ISCHEMIC ATTACK): Primary | ICD-10-CM

## 2024-11-20 LAB
ALBUMIN SERPL-MCNC: 4.3 G/DL (ref 3.5–5.2)
ALP SERPL-CCNC: 64 U/L (ref 35–104)
ALT SERPL-CCNC: 10 U/L (ref 0–32)
ANION GAP SERPL CALCULATED.3IONS-SCNC: 7 MMOL/L (ref 7–16)
AST SERPL-CCNC: 17 U/L (ref 0–31)
BASOPHILS # BLD: 0.03 K/UL (ref 0–0.2)
BASOPHILS NFR BLD: 1 % (ref 0–2)
BILIRUB SERPL-MCNC: 0.3 MG/DL (ref 0–1.2)
BUN SERPL-MCNC: 14 MG/DL (ref 6–23)
CALCIUM SERPL-MCNC: 9.2 MG/DL (ref 8.6–10.2)
CHLORIDE SERPL-SCNC: 101 MMOL/L (ref 98–107)
CO2 SERPL-SCNC: 31 MMOL/L (ref 22–29)
CREAT SERPL-MCNC: 0.7 MG/DL (ref 0.5–1)
EOSINOPHIL # BLD: 0.05 K/UL (ref 0.05–0.5)
EOSINOPHILS RELATIVE PERCENT: 1 % (ref 0–6)
ERYTHROCYTE [DISTWIDTH] IN BLOOD BY AUTOMATED COUNT: 14.5 % (ref 11.5–15)
ERYTHROCYTE [SEDIMENTATION RATE] IN BLOOD BY WESTERGREN METHOD: 20 MM/HR (ref 0–20)
GFR, ESTIMATED: 87 ML/MIN/1.73M2
GLUCOSE SERPL-MCNC: 113 MG/DL (ref 74–99)
HCT VFR BLD AUTO: 41.4 % (ref 34–48)
HGB BLD-MCNC: 13.2 G/DL (ref 11.5–15.5)
IMM GRANULOCYTES # BLD AUTO: <0.03 K/UL (ref 0–0.58)
IMM GRANULOCYTES NFR BLD: 0 % (ref 0–5)
INR PPP: 2
LYMPHOCYTES NFR BLD: 1.43 K/UL (ref 1.5–4)
LYMPHOCYTES RELATIVE PERCENT: 28 % (ref 20–42)
MAGNESIUM SERPL-MCNC: 2.2 MG/DL (ref 1.6–2.6)
MCH RBC QN AUTO: 28 PG (ref 26–35)
MCHC RBC AUTO-ENTMCNC: 31.9 G/DL (ref 32–34.5)
MCV RBC AUTO: 87.7 FL (ref 80–99.9)
MONOCYTES NFR BLD: 0.45 K/UL (ref 0.1–0.95)
MONOCYTES NFR BLD: 9 % (ref 2–12)
NEUTROPHILS NFR BLD: 62 % (ref 43–80)
NEUTS SEG NFR BLD: 3.21 K/UL (ref 1.8–7.3)
PLATELET # BLD AUTO: 213 K/UL (ref 130–450)
PMV BLD AUTO: 9.2 FL (ref 7–12)
POTASSIUM SERPL-SCNC: 4.4 MMOL/L (ref 3.5–5)
PROT SERPL-MCNC: 7.5 G/DL (ref 6.4–8.3)
PROTHROMBIN TIME: 22.2 SEC (ref 9.3–12.4)
RBC # BLD AUTO: 4.72 M/UL (ref 3.5–5.5)
SODIUM SERPL-SCNC: 139 MMOL/L (ref 132–146)
WBC OTHER # BLD: 5.2 K/UL (ref 4.5–11.5)

## 2024-11-20 PROCEDURE — 93880 EXTRACRANIAL BILAT STUDY: CPT

## 2024-11-20 PROCEDURE — 6370000000 HC RX 637 (ALT 250 FOR IP): Performed by: INTERNAL MEDICINE

## 2024-11-20 PROCEDURE — 85610 PROTHROMBIN TIME: CPT

## 2024-11-20 PROCEDURE — G0378 HOSPITAL OBSERVATION PER HR: HCPCS

## 2024-11-20 PROCEDURE — 85025 COMPLETE CBC W/AUTO DIFF WBC: CPT

## 2024-11-20 PROCEDURE — 83735 ASSAY OF MAGNESIUM: CPT

## 2024-11-20 PROCEDURE — 85652 RBC SED RATE AUTOMATED: CPT

## 2024-11-20 PROCEDURE — 80053 COMPREHEN METABOLIC PANEL: CPT

## 2024-11-20 PROCEDURE — 93005 ELECTROCARDIOGRAM TRACING: CPT | Performed by: STUDENT IN AN ORGANIZED HEALTH CARE EDUCATION/TRAINING PROGRAM

## 2024-11-20 PROCEDURE — 2580000003 HC RX 258: Performed by: INTERNAL MEDICINE

## 2024-11-20 PROCEDURE — 71046 X-RAY EXAM CHEST 2 VIEWS: CPT

## 2024-11-20 PROCEDURE — 99285 EMERGENCY DEPT VISIT HI MDM: CPT

## 2024-11-20 PROCEDURE — 6370000000 HC RX 637 (ALT 250 FOR IP): Performed by: STUDENT IN AN ORGANIZED HEALTH CARE EDUCATION/TRAINING PROGRAM

## 2024-11-20 PROCEDURE — 70450 CT HEAD/BRAIN W/O DYE: CPT

## 2024-11-20 RX ORDER — LATANOPROST 50 UG/ML
1 SOLUTION/ DROPS OPHTHALMIC NIGHTLY
Status: DISCONTINUED | OUTPATIENT
Start: 2024-11-20 | End: 2024-11-22 | Stop reason: HOSPADM

## 2024-11-20 RX ORDER — MAGNESIUM SULFATE IN WATER 40 MG/ML
2000 INJECTION, SOLUTION INTRAVENOUS PRN
Status: DISCONTINUED | OUTPATIENT
Start: 2024-11-20 | End: 2024-11-22 | Stop reason: HOSPADM

## 2024-11-20 RX ORDER — POTASSIUM CHLORIDE 1500 MG/1
40 TABLET, EXTENDED RELEASE ORAL PRN
Status: DISCONTINUED | OUTPATIENT
Start: 2024-11-20 | End: 2024-11-22 | Stop reason: HOSPADM

## 2024-11-20 RX ORDER — ONDANSETRON 2 MG/ML
4 INJECTION INTRAMUSCULAR; INTRAVENOUS EVERY 6 HOURS PRN
Status: DISCONTINUED | OUTPATIENT
Start: 2024-11-20 | End: 2024-11-22 | Stop reason: HOSPADM

## 2024-11-20 RX ORDER — SODIUM CHLORIDE 0.9 % (FLUSH) 0.9 %
5-40 SYRINGE (ML) INJECTION EVERY 12 HOURS SCHEDULED
Status: DISCONTINUED | OUTPATIENT
Start: 2024-11-20 | End: 2024-11-22 | Stop reason: HOSPADM

## 2024-11-20 RX ORDER — ACETAMINOPHEN 325 MG/1
650 TABLET ORAL EVERY 6 HOURS PRN
Status: DISCONTINUED | OUTPATIENT
Start: 2024-11-20 | End: 2024-11-22 | Stop reason: HOSPADM

## 2024-11-20 RX ORDER — POTASSIUM CHLORIDE 7.45 MG/ML
10 INJECTION INTRAVENOUS PRN
Status: DISCONTINUED | OUTPATIENT
Start: 2024-11-20 | End: 2024-11-22 | Stop reason: HOSPADM

## 2024-11-20 RX ORDER — SODIUM CHLORIDE 9 MG/ML
INJECTION, SOLUTION INTRAVENOUS PRN
Status: DISCONTINUED | OUTPATIENT
Start: 2024-11-20 | End: 2024-11-22 | Stop reason: HOSPADM

## 2024-11-20 RX ORDER — SODIUM CHLORIDE 0.9 % (FLUSH) 0.9 %
5-40 SYRINGE (ML) INJECTION PRN
Status: DISCONTINUED | OUTPATIENT
Start: 2024-11-20 | End: 2024-11-22 | Stop reason: HOSPADM

## 2024-11-20 RX ORDER — ASPIRIN 81 MG/1
81 TABLET, CHEWABLE ORAL ONCE
Status: COMPLETED | OUTPATIENT
Start: 2024-11-20 | End: 2024-11-20

## 2024-11-20 RX ORDER — WARFARIN SODIUM 3 MG/1
3 TABLET ORAL
Status: COMPLETED | OUTPATIENT
Start: 2024-11-20 | End: 2024-11-20

## 2024-11-20 RX ORDER — ATORVASTATIN CALCIUM 20 MG/1
20 TABLET, FILM COATED ORAL NIGHTLY
Status: DISCONTINUED | OUTPATIENT
Start: 2024-11-20 | End: 2024-11-22 | Stop reason: HOSPADM

## 2024-11-20 RX ORDER — ACETAMINOPHEN 650 MG/1
650 SUPPOSITORY RECTAL EVERY 6 HOURS PRN
Status: DISCONTINUED | OUTPATIENT
Start: 2024-11-20 | End: 2024-11-22 | Stop reason: HOSPADM

## 2024-11-20 RX ORDER — DOCUSATE SODIUM 100 MG/1
100 CAPSULE, LIQUID FILLED ORAL 2 TIMES DAILY
Status: DISCONTINUED | OUTPATIENT
Start: 2024-11-20 | End: 2024-11-22 | Stop reason: HOSPADM

## 2024-11-20 RX ORDER — POLYETHYLENE GLYCOL 3350 17 G/17G
17 POWDER, FOR SOLUTION ORAL DAILY PRN
Status: DISCONTINUED | OUTPATIENT
Start: 2024-11-20 | End: 2024-11-22 | Stop reason: HOSPADM

## 2024-11-20 RX ORDER — METOPROLOL SUCCINATE 50 MG/1
50 TABLET, EXTENDED RELEASE ORAL EVERY MORNING
Status: DISCONTINUED | OUTPATIENT
Start: 2024-11-21 | End: 2024-11-22 | Stop reason: HOSPADM

## 2024-11-20 RX ORDER — ONDANSETRON 4 MG/1
4 TABLET, ORALLY DISINTEGRATING ORAL EVERY 8 HOURS PRN
Status: DISCONTINUED | OUTPATIENT
Start: 2024-11-20 | End: 2024-11-22 | Stop reason: HOSPADM

## 2024-11-20 RX ADMIN — SODIUM CHLORIDE, PRESERVATIVE FREE 10 ML: 5 INJECTION INTRAVENOUS at 20:02

## 2024-11-20 RX ADMIN — LATANOPROST 1 DROP: 50 SOLUTION OPHTHALMIC at 23:40

## 2024-11-20 RX ADMIN — DOCUSATE SODIUM 100 MG: 100 CAPSULE, LIQUID FILLED ORAL at 20:00

## 2024-11-20 RX ADMIN — ATORVASTATIN CALCIUM 20 MG: 20 TABLET, FILM COATED ORAL at 20:00

## 2024-11-20 RX ADMIN — ASPIRIN 81 MG CHEWABLE TABLET 81 MG: 81 TABLET CHEWABLE at 17:23

## 2024-11-20 RX ADMIN — WARFARIN SODIUM 3 MG: 3 TABLET ORAL at 23:40

## 2024-11-20 ASSESSMENT — ENCOUNTER SYMPTOMS
PHOTOPHOBIA: 0
NAUSEA: 0
DIARRHEA: 0
COUGH: 0
VOMITING: 0
ABDOMINAL DISTENTION: 0
SHORTNESS OF BREATH: 0
ABDOMINAL PAIN: 0
CHEST TIGHTNESS: 0

## 2024-11-20 NOTE — ED NOTES
Department of Emergency Medicine  FIRST PROVIDER TRIAGE NOTE             Independent MLP           11/20/24  1:37 PM EST    Date of Encounter: 11/20/24   MRN: [id number]      HPI: Ksenia Spear is a 83 y.o. female who presents to the ED for No chief complaint on file.  Patient had a few episodes of blurry vision since Sunday.  She states that she did lose complete vision in right eye for 5 to 10 minutes on Sunday.  Currently just blurry vision.  She states that she was seen prior to arrival by ophthalmologist and was told to come in for evaluation.    ROS: Negative for cp or sob.    PE: Gen Appearance/Constitutional: alert  Musculoskeletal: moves all extremities x 4     Initial Plan of Care: All treatment areas with department are currently occupied. Plan to order/Initiate the following while awaiting opening in ED:  Initiate Treatment-Testing, Proceed toTreatment Area When Bed Available for ED Attending/MLP to Continue Care    Electronically signed by ALEXUS Caruso NP   DD: 11/20/24       Roque Phelan APRN - NP  11/20/24 2895

## 2024-11-20 NOTE — ED PROVIDER NOTES
Ksenia Spear is an 83 year old female who presented to ED with concern for vision loss.  Patient had reported that she completely lost her vision on Sunday.  Patient stated that symptoms lasted for about 10 or 15 minutes and then returned.  Patient does have some blurry vision patient went to her ophthalmologist office today because she does have a history of recent cataract surgery.  Patient was sent in for evaluation for possible TIA patient denies weakness, numbness, tingling, nausea, vomiting    The history is provided by the patient, medical records and a relative.        Review of Systems   Constitutional:  Negative for chills, diaphoresis, fatigue and fever.   Eyes:  Negative for photophobia and visual disturbance.   Respiratory:  Negative for cough, chest tightness and shortness of breath.    Cardiovascular:  Negative for chest pain, palpitations and leg swelling.   Gastrointestinal:  Negative for abdominal distention, abdominal pain, diarrhea, nausea and vomiting.   Genitourinary:  Negative for dysuria.   Musculoskeletal:  Negative for neck pain and neck stiffness.   Skin:  Negative for pallor and rash.   Neurological:  Negative for dizziness, syncope, speech difficulty, weakness, light-headedness and headaches.   Psychiatric/Behavioral:  Negative for confusion.         Physical Exam  Vitals and nursing note reviewed.   Constitutional:       General: She is not in acute distress.     Appearance: Normal appearance. She is not ill-appearing.   HENT:      Head: Normocephalic and atraumatic.   Eyes:      General: No scleral icterus.     Conjunctiva/sclera: Conjunctivae normal.      Pupils: Pupils are equal, round, and reactive to light.   Cardiovascular:      Rate and Rhythm: Normal rate and regular rhythm.   Pulmonary:      Effort: Pulmonary effort is normal.      Breath sounds: Normal breath sounds.   Abdominal:      General: Bowel sounds are normal. There is no distension.      Palpations: Abdomen is soft.  Kyphoplasty; Fixation Kyphoplasty (1/5/13); and Kyphosis surgery (N/A, 6/17/2019).    Social History:  reports that she has never smoked. She has never used smokeless tobacco. She reports that she does not drink alcohol and does not use drugs.    Family History: family history is not on file.     The patient’s home medications have been reviewed.    Allergies: Bacitracin and Macrobid [nitrofurantoin]    -------------------------------------------------- RESULTS -------------------------------------------------    LABS:  Results for orders placed or performed during the hospital encounter of 11/20/24   CBC with Auto Differential   Result Value Ref Range    WBC 5.2 4.5 - 11.5 k/uL    RBC 4.72 3.50 - 5.50 m/uL    Hemoglobin 13.2 11.5 - 15.5 g/dL    Hematocrit 41.4 34.0 - 48.0 %    MCV 87.7 80.0 - 99.9 fL    MCH 28.0 26.0 - 35.0 pg    MCHC 31.9 (L) 32.0 - 34.5 g/dL    RDW 14.5 11.5 - 15.0 %    Platelets 213 130 - 450 k/uL    MPV 9.2 7.0 - 12.0 fL    Neutrophils % 62 43.0 - 80.0 %    Lymphocytes % 28 20.0 - 42.0 %    Monocytes % 9 2.0 - 12.0 %    Eosinophils % 1 0 - 6 %    Basophils % 1 0.0 - 2.0 %    Immature Granulocytes % 0 0.0 - 5.0 %    Neutrophils Absolute 3.21 1.80 - 7.30 k/uL    Lymphocytes Absolute 1.43 (L) 1.50 - 4.00 k/uL    Monocytes Absolute 0.45 0.10 - 0.95 k/uL    Eosinophils Absolute 0.05 0.05 - 0.50 k/uL    Basophils Absolute 0.03 0.00 - 0.20 k/uL    Immature Granulocytes Absolute <0.03 0.00 - 0.58 k/uL   CMP   Result Value Ref Range    Sodium 139 132 - 146 mmol/L    Potassium 4.4 3.5 - 5.0 mmol/L    Chloride 101 98 - 107 mmol/L    CO2 31 (H) 22 - 29 mmol/L    Anion Gap 7 7 - 16 mmol/L    Glucose 113 (H) 74 - 99 mg/dL    BUN 14 6 - 23 mg/dL    Creatinine 0.7 0.50 - 1.00 mg/dL    Est, Glom Filt Rate 87 >60 mL/min/1.73m2    Calcium 9.2 8.6 - 10.2 mg/dL    Total Protein 7.5 6.4 - 8.3 g/dL    Albumin 4.3 3.5 - 5.2 g/dL    Total Bilirubin 0.3 0.0 - 1.2 mg/dL    Alkaline Phosphatase 64 35 - 104 U/L    ALT 10 0

## 2024-11-20 NOTE — PROGRESS NOTES
Patient's jewelry removed and placed in an envelope. Envelope given to patient. Patient left CT department with jewelry. Earrings and medic alert

## 2024-11-20 NOTE — ED NOTES
[facility]d[place] in [place] asked by this RN to place pt in transport from  to [facility]. Pt transport request from ED 31 to  in progress.

## 2024-11-21 ENCOUNTER — APPOINTMENT (OUTPATIENT)
Age: 84
End: 2024-11-21
Attending: INTERNAL MEDICINE
Payer: MEDICARE

## 2024-11-21 LAB
CHOLEST SERPL-MCNC: 161 MG/DL
ECHO AO ASC DIAM: 3.7 CM
ECHO AO ASCENDING AORTA INDEX: 2.48 CM/M2
ECHO AR MAX VEL PISA: 4.5 M/S
ECHO AV AREA PEAK VELOCITY: 1.4 CM2
ECHO AV AREA VTI: 1.7 CM2
ECHO AV AREA/BSA PEAK VELOCITY: 0.9 CM2/M2
ECHO AV AREA/BSA VTI: 1.1 CM2/M2
ECHO AV CUSP MM: 1.6 CM
ECHO AV MEAN GRADIENT: 11 MMHG
ECHO AV MEAN VELOCITY: 1.6 M/S
ECHO AV PEAK GRADIENT: 22 MMHG
ECHO AV PEAK VELOCITY: 2.4 M/S
ECHO AV REGURGITANT PHT: 563.5 MILLISECOND
ECHO AV VELOCITY RATIO: 0.42
ECHO AV VTI: 50.6 CM
ECHO BSA: 1.53 M2
ECHO EST RA PRESSURE: 8 MMHG
ECHO LA DIAMETER INDEX: 2.21 CM/M2
ECHO LA DIAMETER: 3.3 CM
ECHO LA VOL A-L A2C: 38 ML (ref 22–52)
ECHO LA VOL A-L A4C: 53 ML (ref 22–52)
ECHO LA VOL MOD A2C: 36 ML (ref 22–52)
ECHO LA VOL MOD A4C: 47 ML (ref 22–52)
ECHO LA VOLUME AREA LENGTH: 46 ML
ECHO LA VOLUME INDEX A-L A2C: 26 ML/M2 (ref 16–34)
ECHO LA VOLUME INDEX A-L A4C: 36 ML/M2 (ref 16–34)
ECHO LA VOLUME INDEX AREA LENGTH: 31 ML/M2 (ref 16–34)
ECHO LA VOLUME INDEX MOD A2C: 24 ML/M2 (ref 16–34)
ECHO LA VOLUME INDEX MOD A4C: 32 ML/M2 (ref 16–34)
ECHO LV EF PHYSICIAN: 60 %
ECHO LV FRACTIONAL SHORTENING: 32 % (ref 28–44)
ECHO LV INTERNAL DIMENSION DIASTOLE INDEX: 2.75 CM/M2
ECHO LV INTERNAL DIMENSION DIASTOLIC: 4.1 CM (ref 3.9–5.3)
ECHO LV INTERNAL DIMENSION SYSTOLIC INDEX: 1.88 CM/M2
ECHO LV INTERNAL DIMENSION SYSTOLIC: 2.8 CM
ECHO LV IVSD: 1.5 CM (ref 0.6–0.9)
ECHO LV IVSS: 1.4 CM
ECHO LV MASS 2D: 216.6 G (ref 67–162)
ECHO LV MASS INDEX 2D: 145.4 G/M2 (ref 43–95)
ECHO LV POSTERIOR WALL DIASTOLIC: 1.3 CM (ref 0.6–0.9)
ECHO LV POSTERIOR WALL SYSTOLIC: 1.3 CM
ECHO LV RELATIVE WALL THICKNESS RATIO: 0.63
ECHO LVOT AREA: 3.1 CM2
ECHO LVOT AV VTI INDEX: 0.52
ECHO LVOT DIAM: 2 CM
ECHO LVOT MEAN GRADIENT: 3 MMHG
ECHO LVOT PEAK GRADIENT: 4 MMHG
ECHO LVOT PEAK VELOCITY: 1 M/S
ECHO LVOT STROKE VOLUME INDEX: 55.6 ML/M2
ECHO LVOT SV: 82.9 ML
ECHO LVOT VTI: 26.4 CM
ECHO MV A VELOCITY: 0.74 M/S
ECHO MV AREA PHT: 4.5 CM2
ECHO MV AREA VTI: 3.2 CM2
ECHO MV E DECELERATION TIME (DT): 255.3 MS
ECHO MV E VELOCITY: 0.77 M/S
ECHO MV E/A RATIO: 1.04
ECHO MV LVOT VTI INDEX: 0.97
ECHO MV MAX VELOCITY: 0.8 M/S
ECHO MV MEAN GRADIENT: 1 MMHG
ECHO MV MEAN VELOCITY: 0.5 M/S
ECHO MV PEAK GRADIENT: 3 MMHG
ECHO MV PRESSURE HALF TIME (PHT): 49 MS
ECHO MV VTI: 25.6 CM
ECHO PULMONARY ARTERY END DIASTOLIC PRESSURE: 5 MMHG
ECHO PV MAX VELOCITY: 0.6 M/S
ECHO PV MEAN GRADIENT: 1 MMHG
ECHO PV MEAN VELOCITY: 0.4 M/S
ECHO PV PEAK GRADIENT: 1 MMHG
ECHO PV REGURGITANT MAX VELOCITY: 1.1 M/S
ECHO PV VTI: 12.6 CM
ECHO RIGHT VENTRICULAR SYSTOLIC PRESSURE (RVSP): 36 MMHG
ECHO RV INTERNAL DIMENSION: 2.9 CM
ECHO RV TAPSE: 1.7 CM (ref 1.7–?)
ECHO TV REGURGITANT MAX VELOCITY: 2.63 M/S
ECHO TV REGURGITANT PEAK GRADIENT: 28 MMHG
EKG ATRIAL RATE: 67 BPM
EKG P AXIS: 58 DEGREES
EKG P-R INTERVAL: 144 MS
EKG Q-T INTERVAL: 394 MS
EKG QRS DURATION: 72 MS
EKG QTC CALCULATION (BAZETT): 416 MS
EKG R AXIS: 18 DEGREES
EKG T AXIS: 34 DEGREES
EKG VENTRICULAR RATE: 67 BPM
HBA1C MFR BLD: 5.9 % (ref 4–5.6)
HDLC SERPL-MCNC: 71 MG/DL
INR PPP: 2.3
LDLC SERPL CALC-MCNC: 80 MG/DL
PROTHROMBIN TIME: 24.7 SEC (ref 9.3–12.4)
TRIGL SERPL-MCNC: 48 MG/DL
VLDLC SERPL CALC-MCNC: 10 MG/DL

## 2024-11-21 PROCEDURE — 93010 ELECTROCARDIOGRAM REPORT: CPT | Performed by: INTERNAL MEDICINE

## 2024-11-21 PROCEDURE — G0378 HOSPITAL OBSERVATION PER HR: HCPCS

## 2024-11-21 PROCEDURE — 83036 HEMOGLOBIN GLYCOSYLATED A1C: CPT

## 2024-11-21 PROCEDURE — 85610 PROTHROMBIN TIME: CPT

## 2024-11-21 PROCEDURE — 97165 OT EVAL LOW COMPLEX 30 MIN: CPT

## 2024-11-21 PROCEDURE — 6370000000 HC RX 637 (ALT 250 FOR IP): Performed by: INTERNAL MEDICINE

## 2024-11-21 PROCEDURE — 2580000003 HC RX 258: Performed by: INTERNAL MEDICINE

## 2024-11-21 PROCEDURE — 93306 TTE W/DOPPLER COMPLETE: CPT | Performed by: INTERNAL MEDICINE

## 2024-11-21 PROCEDURE — 80061 LIPID PANEL: CPT

## 2024-11-21 PROCEDURE — 36415 COLL VENOUS BLD VENIPUNCTURE: CPT

## 2024-11-21 PROCEDURE — 93306 TTE W/DOPPLER COMPLETE: CPT

## 2024-11-21 RX ORDER — ASPIRIN 81 MG/1
81 TABLET ORAL DAILY
Status: DISCONTINUED | OUTPATIENT
Start: 2024-11-21 | End: 2024-11-22 | Stop reason: HOSPADM

## 2024-11-21 RX ORDER — WARFARIN SODIUM 3 MG/1
3 TABLET ORAL
Status: COMPLETED | OUTPATIENT
Start: 2024-11-21 | End: 2024-11-21

## 2024-11-21 RX ADMIN — LATANOPROST 1 DROP: 50 SOLUTION OPHTHALMIC at 20:56

## 2024-11-21 RX ADMIN — SODIUM CHLORIDE, PRESERVATIVE FREE 10 ML: 5 INJECTION INTRAVENOUS at 11:23

## 2024-11-21 RX ADMIN — WARFARIN SODIUM 3 MG: 3 TABLET ORAL at 20:56

## 2024-11-21 RX ADMIN — METOPROLOL SUCCINATE 50 MG: 50 TABLET, EXTENDED RELEASE ORAL at 09:25

## 2024-11-21 RX ADMIN — DOCUSATE SODIUM 100 MG: 100 CAPSULE, LIQUID FILLED ORAL at 20:56

## 2024-11-21 RX ADMIN — DOCUSATE SODIUM 100 MG: 100 CAPSULE, LIQUID FILLED ORAL at 09:25

## 2024-11-21 RX ADMIN — SODIUM CHLORIDE, PRESERVATIVE FREE 10 ML: 5 INJECTION INTRAVENOUS at 20:56

## 2024-11-21 RX ADMIN — ASPIRIN 81 MG: 81 TABLET, COATED ORAL at 09:25

## 2024-11-21 RX ADMIN — ATORVASTATIN CALCIUM 20 MG: 20 TABLET, FILM COATED ORAL at 20:55

## 2024-11-21 NOTE — PROGRESS NOTES
Pharmacy Consultation Note  (Warfarin Dosing and Monitoring)    Initial consult date: 11/20/24  Consulting Provider: Denny Lopes DO Martha C Rainer is a 83 y.o. female for whom pharmacy has been asked to manage warfarin therapy.     Weight:   Wt Readings from Last 1 Encounters:   10/08/24 56.7 kg (125 lb)       TSH:    Lab Results   Component Value Date/Time    TSH 1.590 02/10/2015 08:46 AM       Hepatic Function Panel:                            Lab Results   Component Value Date/Time    ALKPHOS 64 11/20/2024 01:53 PM    ALT 10 11/20/2024 01:53 PM    AST 17 11/20/2024 01:53 PM    BILITOT 0.3 11/20/2024 01:53 PM    BILIDIR 0.2 06/07/2011 08:33 AM       Current significant warfarin drug-drug interactions include: No significant interactions    Recent Labs     11/20/24  1353   HGB 13.2        Date Warfarin Dose INR Heparin or LMWH Comment   11/19 3 mg N/A --    11/20 < 3.5 mg > @ 2100 2 --                           Assessment:  Patient is a 83 y.o. female on warfarin for Atrial Fibrillation.  Patient's home warfarin dosing regimen is 3 mg every other day alternating 3.5 mg every other day.   Goal INR 2 - 3  INR 2 today    Plan:  Warfarin 3.5 mg tonight  Daily PT/INR until the INR is stable within the therapeutic range  Pharmacist will follow and monitor/adjust dosing as necessary    Thank you for this consult,    Danilo Araiza, PharmD  PGY-1 Pharmacy Practice Resident   11/20/2024 8:14 PM        wrist

## 2024-11-21 NOTE — PLAN OF CARE
Problem: ABCDS Injury Assessment  Goal: Absence of physical injury  Outcome: Progressing     Problem: Safety - Adult  Goal: Free from fall injury  Outcome: Progressing     Problem: Chronic Conditions and Co-morbidities  Goal: Patient's chronic conditions and co-morbidity symptoms are monitored and maintained or improved  Outcome: Progressing     Problem: Discharge Planning  Goal: Discharge to home or other facility with appropriate resources  Outcome: Progressing

## 2024-11-21 NOTE — PROGRESS NOTES
Pharmacy Consultation Note  (Warfarin Dosing and Monitoring)    Initial consult date: 11/20/24  Consulting Provider: Denny Lopes DO Martha C Rainer is a 83 y.o. female for whom pharmacy has been asked to manage warfarin therapy.     Weight:   Wt Readings from Last 1 Encounters:   11/20/24 56.7 kg (125 lb)       TSH:    Lab Results   Component Value Date/Time    TSH 1.590 02/10/2015 08:46 AM       Hepatic Function Panel:                            Lab Results   Component Value Date/Time    ALKPHOS 64 11/20/2024 01:53 PM    ALT 10 11/20/2024 01:53 PM    AST 17 11/20/2024 01:53 PM    BILITOT 0.3 11/20/2024 01:53 PM    BILIDIR 0.2 06/07/2011 08:33 AM       Current significant warfarin drug-drug interactions include: No significant interactions    Recent Labs     11/20/24  1353   HGB 13.2        Date Warfarin Dose INR Heparin or LMWH Comment   11/19 3 mg N/A --    11/20 3 mg   2 --    11/21 3 mg 2.3 --                    Assessment:  Patient is a 83 y.o. female on warfarin for Atrial Fibrillation.  Patient's home warfarin dosing regimen is 3 mg daily.   Goal INR 2 - 3  INR 2.3 today    Plan:  Warfarin 3 mg tonight  Daily PT/INR until the INR is stable within the therapeutic range  Pharmacist will follow and monitor/adjust dosing as necessary    Thank you for this consult,    Mitra Schneider, PharmD  PGY1 Pharmacy Practice Resident [phone]  11/21/2024 7:32 AM

## 2024-11-21 NOTE — PROGRESS NOTES
Physical Therapy    Date: 2024       Patient Name: Ksenia Spear  : 1940      MRN: [id number]    PT order received. Chart has been reviewed. PT evaluation attempted and will be on hold at this time d/t pt receiving an echo at time of evaluation. Will continue to follow and complete evaluation at later time.     Brigid Barrios, PT

## 2024-11-21 NOTE — PROGRESS NOTES
4 Eyes Skin Assessment     NAME:  Ksenia Spear  YOB: 1940  MEDICAL RECORD NUMBER:  [id number]    The patient is being assessed for  Admission    I agree that at least one RN has performed a thorough Head to Toe Skin Assessment on the patient. ALL assessment sites listed below have been assessed.      Areas assessed by both nurses:    Head, Face, Ears, Shoulders, Back, Chest, Arms, Elbows, Hands, Sacrum. Buttock, Coccyx, Ischium, and Legs. Feet and Heels        Does the Patient have a Wound? No noted wound(s)       Leobardo Prevention initiated by RN: Yes  Wound Care Orders initiated by RN: No    Pressure Injury (Stage 3,4, Unstageable, DTI, NWPT, and Complex wounds) if present, place Wound referral order by RN under : No    New Ostomies, if present place, Ostomy referral order under : No     Nurse 1 eSignature: Electronically signed by Lupe Chan RN on 11/20/24 at 10:03 PM EST    **SHARE this note so that the co-signing nurse can place an eSignature**    Nurse 2 eSignature: Electronically signed by Jasmyne Hathaway RN on 11/20/24 at 10:04 PM EST

## 2024-11-21 NOTE — CARE COORDINATION
11/21/24 Transition of care:  Pt admitted OBS from ED for TIA Neurology consulted CT head negative Met with pt to discuss transition of care and discharge preferences Pt lives by herself in a one story condo with one stair to enter Pt is independent with ADLs and uses no AD for ambulation Pt has no HHC or PATRICIA history PCP is Ethan [facility] is [facility] in [place] Plan is home with no needs expressed for discharge Son will transport CM/SW to follow Electronically signed by Gilbert Peters RN on 11/21/2024 at 9:30 AM     Case Management Assessment  Initial Evaluation    Date/Time of Evaluation: 11/21/2024 9:31 AM  Assessment Completed by: Gilbert Peters RN    If patient is discharged prior to next notation, then this note serves as note for discharge by case management.    Patient Name: Ksenia Spear                   YOB: 1940  Diagnosis: TIA (transient ischemic attack) [G45.9]                   Date / Time: 11/20/2024  1:44 PM    Patient Admission Status: Observation   Readmission Risk (Low < 19, Mod (19-27), High > 27): No data recorded  Current PCP: Shailesh Long MD  PCP verified by CM? Yes    Chart Reviewed: Yes      History Provided by: Patient  Patient Orientation: Alert and Oriented    Patient Cognition: Alert    Hospitalization in the last 30 days (Readmission):  No    If yes, Readmission Assessment in  Navigator will be completed.    Advance Directives:      Code Status: Full Code   Patient's Primary Decision Maker is: Legal Next of Kin    Primary Decision Maker: Bhavik Spear \"Jermaine\" - Child - [phone]    Secondary Decision Maker: Scott Spear - Child - [phone]    Discharge Planning:    Patient lives with: Alone Type of Home: Apartment  Primary Care Giver: Self  Patient Support Systems include: Children, Family Members   Current Financial resources:    Current community resources:    Current services prior to admission: None            Current DME:              Type of Home Care

## 2024-11-21 NOTE — ACP (ADVANCE CARE PLANNING)
Advance Care Planning   Healthcare Decision Maker:    Primary Decision Maker: Bhavik Spear \"Jermaine\" - Child - [phone]    Secondary Decision Maker: Scott Spear - Child - [phone]    Click here to complete Healthcare Decision Makers including selection of the Healthcare Decision Maker Relationship (ie \"Primary\").       Electronically signed by Gilbert Peters RN on 11/21/2024 at 9:27 AM

## 2024-11-21 NOTE — H&P
[facility]              [address] [place] [address]                           HISTORY & PHYSICAL      PATIENT NAME: SILVERIO TRAN               : 1940  MED REC NO: [id number]                        ROOM: 8203  ACCOUNT NO: [id number]                       ADMIT DATE: 2024  PROVIDER: Denny Lopes DO      PRIMARY CARE PHYSICIAN:  Ethan    CHIEF COMPLAINT:  Transient right eye vision loss.    HISTORY OF PRESENT ILLNESS:  The patient is an 83-year-old  female who presented to the emergency room for evaluation.  She states a few days ago, she woke up with right eye vision loss, lasted about 5 minutes.  Then, she was seen by her ophthalmologist, Dr. Murillo.  She was sent to the emergency room for further evaluation.  The patient with past medical history of paroxysmal atrial fibrillation, on chronic anticoagulation.  INR in the emergency room was therapeutic.    PAST MEDICAL HISTORY:  Proximal paroxysmal atrial fibrillation, chronic anticoagulation, elevated cholesterol, glaucoma, osteoporosis.    PAST SURGICAL HISTORY:  Kyphoplasty, appendectomy, tonsillectomy, bilateral eye cataract surgery, hysterectomy.    MEDICATIONS:  Prior to admission, calcium, Prolia, Colace, Xalatan eye drops, Toprol-XL, Zocor, vitamin B12, vitamin D, warfarin.    REVIEW OF SYSTEMS:  Remarkable for above-stated chief complaint.    ALLERGIES:  TO MACROBID AND BACITRACIN.      SOCIAL HISTORY:  No tobacco.  No alcohol.    PHYSICAL EXAMINATION:  GENERAL APPEARANCE:  Reveals an 83-year-old  female who is alert and oriented x3, cooperative, and a good historian.  VITAL SIGNS:  On admission, temperature 98 degrees, pulse 82, respirations 16, blood pressure 176/79.  HEAD:  Normocephalic, atraumatic.  EYES:  Pupils equal and reactive to light.  Extraocular muscles intact.  Fundi not well visualized.  NOSE:  No obstruction, polyp, or discharge noted.  MOUTH:

## 2024-11-21 NOTE — PROGRESS NOTES
Occupational Therapy  OCCUPATIONAL THERAPY INITIAL EVALUATION    [facility]  [address] [place], [place]         Date:2024                                                  Patient Name: Ksenia Spear    MRN: [id number]    : 1940    Room: [address] [place] [address]      Evaluating OT: Sara Hernandez OTR/BARBI [id number]       Referring Provider:Denny Lopes DO     Specific Provider Orders/Date: OT eval and treat 24      Diagnosis: TIA     Surgery: none      Pertinent Medical History:       Past Medical History:   Diagnosis Date    Atrial fibrillation (HCC)     Hyperlipidemia     Osteoporosis     Shingles 2022         Past Surgical History:   Procedure Laterality Date    APPENDECTOMY      FIXATION KYPHOPLASTY      FIXATION KYPHOPLASTY  13    t 5    HYSTERECTOMY (CERVIX STATUS UNKNOWN)      KYPHOSIS SURGERY N/A 2019    L1 KYPHOPLASTY AND THORACIC 12 performed by Favian Leong DO at [facility] OR      Precautions:  low fall risk     Assessment of current deficits    [] Functional mobility  []ADLs  [] Strength               []Cognition    [] Functional transfers   [] IADLs         [] Safety Awareness   []Endurance    [] Fine Coordination              [] Balance      [] Vision/perception   []Sensation     []Gross Motor Coordination  [] ROM  [] Delirium                   [] Motor Control     OT PLAN OF CARE   OT POC based on physician orders, patient diagnosis and results of clinical assessment    Frequency/Duration: N/A OT eval only   Specific OT Treatment Interventions to include:   Recommended Adaptive Equipment:  none      Home Living: Pt lives alone in a 1 story condo with 1 step in    Bathroom setup: walk in shower    Equipment owned: none     Prior Level of Function: pt was independent  with ADLs , independent  with IADLs; ambulated with no device   Driving: pt does drive   Occupation: pt  is retired     Pain Level: none   Cognition: A&O:  [id number]       ADL/Self Care 41410       Orthotic Management 71544       Manual [id number]     Neuro Re-Ed [id number]       Non-Billable Time          Evaluation Time additionally includes thorough review of current medical information, gathering information on past medical history/social history and prior level of function, interpretation of standardized testing/informal observation of tasks, assessment of data and development of plan of care and goals.        Sara Hernandez OTR/L [id number]

## 2024-11-22 VITALS
BODY MASS INDEX: 26.38 KG/M2 | WEIGHT: 125.66 LBS | DIASTOLIC BLOOD PRESSURE: 60 MMHG | HEIGHT: 58 IN | SYSTOLIC BLOOD PRESSURE: 123 MMHG | RESPIRATION RATE: 18 BRPM | HEART RATE: 71 BPM | TEMPERATURE: 98.7 F | OXYGEN SATURATION: 97 %

## 2024-11-22 LAB
INR PPP: 2.7
PROTHROMBIN TIME: 29.5 SEC (ref 9.3–12.4)

## 2024-11-22 PROCEDURE — 6370000000 HC RX 637 (ALT 250 FOR IP): Performed by: INTERNAL MEDICINE

## 2024-11-22 PROCEDURE — 85610 PROTHROMBIN TIME: CPT

## 2024-11-22 PROCEDURE — 36415 COLL VENOUS BLD VENIPUNCTURE: CPT

## 2024-11-22 PROCEDURE — G0378 HOSPITAL OBSERVATION PER HR: HCPCS

## 2024-11-22 RX ORDER — ASPIRIN 81 MG/1
81 TABLET ORAL DAILY
COMMUNITY
Start: 2024-11-22

## 2024-11-22 RX ORDER — WARFARIN SODIUM 2 MG/1
2 TABLET ORAL
Status: DISCONTINUED | OUTPATIENT
Start: 2024-11-22 | End: 2024-11-22 | Stop reason: HOSPADM

## 2024-11-22 RX ADMIN — DOCUSATE SODIUM 100 MG: 100 CAPSULE, LIQUID FILLED ORAL at 09:09

## 2024-11-22 RX ADMIN — METOPROLOL SUCCINATE 50 MG: 50 TABLET, EXTENDED RELEASE ORAL at 09:09

## 2024-11-22 NOTE — CONSULTS
NEUROLOGY CONSULT NOTE      Requesting Physician: Denny Lopes DO    Reason for Consult:  Evaluate for transient vision loss     History of Present Illness:  Ksenia Spear is a 83 y.o. female  with h/o atrial fibrillation on Coumadin, HLD, shingles, and osteoporosis.  Who was admitted to [facility] on 11/20/2024 with presentation of vision loss in the right eye.  Patient has a history of cataract surgery and was being followed by ophthalmology.  She was at home when she noted complete loss of vision on 11/17/2024.  This apparently lasted about 10 to 15 minutes before resolving spontaneously.  She continues to have some blurring of her vision and subsequently saw her ophthalmologist on 11/20/2024.  She was subsequently referred to the ED for further evaluation of possible CVA versus TIA.  Labs showed electrolytes otherwise are within normal limits with exception of slightly decreased glucose at 113.  CBC also within normal limits.  INR was 2.0.  CT scan of the head she was negative for any acute intracranial abnormalities.  MRI brain was not requested.      Past Medical History:        Diagnosis Date    Atrial fibrillation (HCC)     Hyperlipidemia     Osteoporosis     Shingles 02/2022           Procedure Laterality Date    APPENDECTOMY      FIXATION KYPHOPLASTY      FIXATION KYPHOPLASTY  1/5/13    t 5    HYSTERECTOMY (CERVIX STATUS UNKNOWN)      KYPHOSIS SURGERY N/A 6/17/2019    L1 KYPHOPLASTY AND THORACIC 12 performed by Favian Leong DO at [facility] OR       Social History:  Social History     Tobacco Use   Smoking Status Never   Smokeless Tobacco Never     Social History     Substance and Sexual Activity   Alcohol Use No     Social History     Substance and Sexual Activity   Drug Use No         Family History:   History reviewed. No pertinent family history.    Review of Systems:  Constitutional:  Negative for chills, diaphoresis, fatigue and fever.   Eyes:  Negative for photophobia and  artery demonstrates the end-diastolic velocities of 6.6, 10.1 and 8.2 cm/sec in the proximal, mid and distal segments respectively. The external carotid artery appears patent.  The vertebral artery demonstrates normal antegrade flow. No evidence of focal atherosclerotic plaque. ICA/CCA ratio of 1.1     The right internal carotid artery demonstrates 0-50% stenosis. The left internal carotid artery demonstrates 0-50% stenosis. Bilateral vertebral arteries are patent with flow in the normal direction.     CT Head W/O Contrast    Result Date: 11/20/2024  EXAMINATION: CT OF THE HEAD WITHOUT CONTRAST  11/20/2024 2:45 pm TECHNIQUE: CT of the head was performed without the administration of intravenous contrast. Automated exposure control, iterative reconstruction, and/or weight based adjustment of the mA/kV was utilized to reduce the radiation dose to as low as reasonably achievable. COMPARISON: 03/15/2024 HISTORY: ORDERING SYSTEM PROVIDED HISTORY: tia TECHNOLOGIST PROVIDED HISTORY: Has a \"code stroke\" or \"stroke alert\" been called?->No Reason for exam:->tia Decision Support Exception - unselect if not a suspected or confirmed emergency medical condition->Emergency Medical Condition (MA) What reading provider will be dictating this exam?->CRC FINDINGS: BRAIN/VENTRICLES: There is no acute intracranial hemorrhage, mass effect or midline shift. No abnormal extra-axial fluid collection.  The gray-white differentiation is maintained without evidence of an acute infarct. There is prominence of the ventricles and sulci due to global parenchymal volume loss. There are nonspecific areas of hypoattenuation within the periventricular and subcortical white matter, which likely represent chronic microvascular ischemic change. There is mild cerebral atrophy with periventricular white matter changes. ORBITS: The visualized portion of the orbits demonstrate no acute abnormality. SINUSES: The visualized paranasal sinuses and mastoid air

## 2024-11-22 NOTE — PLAN OF CARE
Problem: ABCDS Injury Assessment  Goal: Absence of physical injury  11/22/2024 0009 by Viki Tabares RN  Outcome: Progressing  11/21/2024 1454 by Ofelia Hook RN  Outcome: Progressing     Problem: Safety - Adult  Goal: Free from fall injury  11/22/2024 0009 by Viki Tabares RN  Outcome: Progressing  11/21/2024 1454 by Ofelia Hook RN  Outcome: Progressing     Problem: Chronic Conditions and Co-morbidities  Goal: Patient's chronic conditions and co-morbidity symptoms are monitored and maintained or improved  11/22/2024 0009 by Viki Tabares RN  Outcome: Progressing  11/21/2024 1454 by Ofelia Hook RN  Outcome: Progressing     Problem: Discharge Planning  Goal: Discharge to home or other facility with appropriate resources  11/22/2024 0009 by Viki Tabares RN  Outcome: Progressing  11/21/2024 1454 by Ofelia Hook RN  Outcome: Progressing

## 2024-11-22 NOTE — PROGRESS NOTES
Pharmacy Consultation Note  (Warfarin Dosing and Monitoring)    Initial consult date: 11/20/24  Consulting Provider: Denny Lopes, DO Ksenia WESTBROOK Rainer is a 83 y.o. female for whom pharmacy has been asked to manage warfarin therapy.     Weight:   Wt Readings from Last 1 Encounters:   11/21/24 57 kg (125 lb 10.6 oz)       TSH:    Lab Results   Component Value Date/Time    TSH 1.590 02/10/2015 08:46 AM       Hepatic Function Panel:                            Lab Results   Component Value Date/Time    ALKPHOS 64 11/20/2024 01:53 PM    ALT 10 11/20/2024 01:53 PM    AST 17 11/20/2024 01:53 PM    BILITOT 0.3 11/20/2024 01:53 PM    BILIDIR 0.2 06/07/2011 08:33 AM       Current significant warfarin drug-drug interactions include: No significant interactions    Recent Labs     11/20/24  1353   HGB 13.2        Date Warfarin Dose INR Heparin or LMWH Comment   11/19 3 mg N/A --    11/20 3 mg   2 --    11/21 3 mg 2.3 --    11/22 2 mg 2.7 --             Assessment:  Patient is a 83 y.o. female on warfarin for Atrial Fibrillation.  Patient's home warfarin dosing regimen is 3 mg daily.   Goal INR 2 - 3  INR 2.7 today    Plan:  Warfarin 2 mg tonight  Daily PT/INR until the INR is stable within the therapeutic range  Pharmacist will follow and monitor/adjust dosing as necessary    Thank you for this consult,    Mitra Schneider, PharmD  PGY1 Pharmacy Practice Resident [phone]  11/22/2024 7:42 AM

## 2024-11-22 NOTE — CONSULTS
Symptoms started Saturday night with sudden onset loss of vision in right eye lasting about 5-10 mins with no associated symptoms. She has history of bilateral cataracts with surgery done. No history of similar symptoms in the past. She went to her ophthalmologist 4 days later and then sent to hospital for further evaluation. Workup so far has been negative including relatively normal echocardiogram, less than 50% stenosis in bilateral ICA on carotid ultrasound with patent vertebral arteries, and normal CT scan of the head. Patient does have a history A-fib and is on Coumadin with INR of 2.3. No focal abnormalities found on examination. There was significant concern about possible transient retinal artery occlusion which could not be verified based on studies. No clear indication for MRI at this point. She is on a therapeutic dose of Coumadin and as a caution can add baby aspirin daily to this regimen. There is always a risk of a bleeding complication but she is on the lower to mid range on her INR.    Recommendations:  1. Add aspirin 81 mg daily to the medication management.  2. Continue on other medications which are  3. Ophthalmology follow-up for monitoring of her high status  4. Outpatient PCP follow-up for continuation of care.

## 2024-11-22 NOTE — CARE COORDINATION
11/22/24 Discharge order noted:  Pt is independent from home and has expressed no needs for discharge Son will transport CM/SW to follow Electronically signed by Gilbert Peters RN on 11/22/2024 at 8:29 AM

## 2025-01-09 ENCOUNTER — HOSPITAL ENCOUNTER (OUTPATIENT)
Dept: MRI IMAGING | Age: 85
Discharge: HOME | End: 2025-01-11
Payer: MEDICARE

## 2025-01-09 DIAGNOSIS — G45.9 TRANSIENT ISCHEMIC ATTACK: ICD-10-CM

## 2025-01-09 PROCEDURE — 2500000003 HC RX 250 WO HCPCS: Performed by: RADIOLOGY

## 2025-01-09 PROCEDURE — 6360000004 HC RX CONTRAST MEDICATION: Performed by: RADIOLOGY

## 2025-01-09 PROCEDURE — A9579 GAD-BASE MR CONTRAST NOS,1ML: HCPCS | Performed by: RADIOLOGY

## 2025-01-09 PROCEDURE — 70553 MRI BRAIN STEM W/O & W/DYE: CPT

## 2025-01-09 RX ORDER — SODIUM CHLORIDE 0.9 % (FLUSH) 0.9 %
5-40 SYRINGE (ML) INJECTION 2 TIMES DAILY
Status: DISCONTINUED | OUTPATIENT
Start: 2025-01-09 | End: 2025-01-12 | Stop reason: HOSPADM

## 2025-01-09 RX ADMIN — Medication 10 ML: at 12:14

## 2025-01-09 RX ADMIN — GADOTERIDOL 11 ML: 279.3 INJECTION, SOLUTION INTRAVENOUS at 12:15

## 2025-03-04 DIAGNOSIS — M81.0 OSTEOPOROSIS, UNSPECIFIED OSTEOPOROSIS TYPE, UNSPECIFIED PATHOLOGICAL FRACTURE PRESENCE: ICD-10-CM

## 2025-03-31 NOTE — PROGRESS NOTES
Called to Dr. Cast's office they will get lab order put in and called  spoke with patient reminded her to go for her labs this week she verbalizes understanding. Per patient current height is 4'11\" and weight 125 lb

## 2025-03-31 NOTE — PROGRESS NOTES
Patient has appointment 4/8/25 for Prolia shot will need order for labs that include a calcium level.

## 2025-04-01 ENCOUNTER — TELEPHONE (OUTPATIENT)
Dept: ENDOCRINOLOGY | Age: 85
End: 2025-04-01

## 2025-04-01 DIAGNOSIS — M81.0 OSTEOPOROSIS, UNSPECIFIED OSTEOPOROSIS TYPE, UNSPECIFIED PATHOLOGICAL FRACTURE PRESENCE: Primary | ICD-10-CM

## 2025-04-01 DIAGNOSIS — E55.9 VITAMIN D DEFICIENCY: ICD-10-CM

## 2025-04-01 DIAGNOSIS — M81.0 OSTEOPOROSIS, UNSPECIFIED OSTEOPOROSIS TYPE, UNSPECIFIED PATHOLOGICAL FRACTURE PRESENCE: ICD-10-CM

## 2025-04-01 LAB
ANION GAP SERPL CALCULATED.3IONS-SCNC: 18 MMOL/L (ref 7–16)
BUN BLDV-MCNC: 21 MG/DL (ref 6–23)
CALCIUM SERPL-MCNC: 9.6 MG/DL (ref 8.6–10.2)
CHLORIDE BLD-SCNC: 100 MMOL/L (ref 98–107)
CO2: 22 MMOL/L (ref 22–29)
CREAT SERPL-MCNC: 0.8 MG/DL (ref 0.5–1)
GFR, ESTIMATED: 76 ML/MIN/1.73M2
GLUCOSE BLD-MCNC: 91 MG/DL (ref 74–99)
POTASSIUM SERPL-SCNC: 4.6 MMOL/L (ref 3.5–5)
SODIUM BLD-SCNC: 140 MMOL/L (ref 132–146)
VITAMIN D 25-HYDROXY: 54.3 NG/ML (ref 30–100)

## 2025-04-02 ENCOUNTER — RESULTS FOLLOW-UP (OUTPATIENT)
Dept: ENDOCRINOLOGY | Age: 85
End: 2025-04-02

## 2025-04-02 NOTE — TELEPHONE ENCOUNTER
Endocrine staff  The patient is scheduled for Prolia injection very soon.  Please make sure we get calcium and vitamin D level.  The order is in   
Got labs done    
This document is complete and the patient is ready for discharge.

## 2025-04-07 NOTE — PROGRESS NOTES
Received a referral for Reclast along with labs. Called to Pharmacist Lara to have creatinine clearance calculated. Patient is 84 years old, height 4'11\", weight 125 lb , gender female, creatinine level 0.8 mg/dL. Pharmacist did calculation and I was told creatinine clearance is 41 ml/min.

## 2025-04-08 ENCOUNTER — HOSPITAL ENCOUNTER (OUTPATIENT)
Dept: INFUSION THERAPY | Age: 85
Setting detail: INFUSION SERIES
Discharge: HOME | End: 2025-04-08
Payer: MEDICARE

## 2025-04-08 VITALS
HEART RATE: 51 BPM | OXYGEN SATURATION: 100 % | DIASTOLIC BLOOD PRESSURE: 65 MMHG | SYSTOLIC BLOOD PRESSURE: 138 MMHG | BODY MASS INDEX: 25.8 KG/M2 | WEIGHT: 128 LBS | HEIGHT: 59 IN | TEMPERATURE: 97.2 F | RESPIRATION RATE: 16 BRPM

## 2025-04-08 DIAGNOSIS — M81.0 SENILE OSTEOPOROSIS: Primary | ICD-10-CM

## 2025-04-08 PROCEDURE — 6360000002 HC RX W HCPCS: Performed by: INTERNAL MEDICINE

## 2025-04-08 PROCEDURE — 96372 THER/PROPH/DIAG INJ SC/IM: CPT

## 2025-04-08 RX ADMIN — DENOSUMAB 60 MG: 60 INJECTION SUBCUTANEOUS at 09:37

## 2025-07-30 ENCOUNTER — APPOINTMENT (OUTPATIENT)
Dept: CT IMAGING | Age: 85
DRG: 536 | End: 2025-07-30
Payer: MEDICARE

## 2025-07-30 ENCOUNTER — APPOINTMENT (OUTPATIENT)
Dept: GENERAL RADIOLOGY | Age: 85
DRG: 536 | End: 2025-07-30
Payer: MEDICARE

## 2025-07-30 ENCOUNTER — HOSPITAL ENCOUNTER (INPATIENT)
Age: 85
LOS: 5 days | Discharge: NURSING, REHAB OR LONG-TERM CARE | DRG: 536 | End: 2025-08-04
Admitting: INTERNAL MEDICINE
Payer: MEDICARE

## 2025-07-30 DIAGNOSIS — S32.591A INFERIOR PUBIC RAMUS FRACTURE, RIGHT, CLOSED, INITIAL ENCOUNTER (HCC): ICD-10-CM

## 2025-07-30 DIAGNOSIS — S22.21XA FRACTURE OF MANUBRIUM, INITIAL ENCOUNTER FOR CLOSED FRACTURE: ICD-10-CM
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
## 2025-07-30 DIAGNOSIS — S32.511A CLOSED FRACTURE OF SUPERIOR RAMUS OF RIGHT PUBIS, INITIAL ENCOUNTER (HCC): Primary | ICD-10-CM
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
## 2025-07-30 PROBLEM — Z79.01 CHRONIC ANTICOAGULATION: Status: ACTIVE | Noted: 2025-07-30

## 2025-07-30 PROBLEM — S32.599A CLOSED FRACTURE OF MULTIPLE PUBIC RAMI (HCC): Status: ACTIVE | Noted: 2025-07-30

## 2025-07-30 LAB
ABO + RH BLD: NORMAL
ALBUMIN SERPL-MCNC: 3.7 G/DL (ref 3.5–5.2)
ALP SERPL-CCNC: 50 U/L (ref 35–104)
ALT SERPL-CCNC: 13 U/L (ref 0–35)
ANION GAP SERPL CALCULATED.3IONS-SCNC: 12 MMOL/L (ref 7–16)
ARM BAND NUMBER: NORMAL
AST SERPL-CCNC: 32 U/L (ref 0–35)
BASOPHILS # BLD: 0.02 K/UL (ref 0–0.2)
BASOPHILS NFR BLD: 0 % (ref 0–2)
BILIRUB SERPL-MCNC: 0.4 MG/DL (ref 0–1.2)
BLOOD BANK SAMPLE EXPIRATION: NORMAL
BLOOD GROUP ANTIBODIES SERPL: NEGATIVE
BUN SERPL-MCNC: 17 MG/DL (ref 8–23)
CALCIUM SERPL-MCNC: 8.6 MG/DL (ref 8.8–10.2)
CHLORIDE SERPL-SCNC: 100 MMOL/L (ref 98–107)
CO2 SERPL-SCNC: 24 MMOL/L (ref 22–29)
CREAT SERPL-MCNC: 0.6 MG/DL (ref 0.5–1)
EOSINOPHIL # BLD: 0 K/UL (ref 0.05–0.5)
EOSINOPHILS RELATIVE PERCENT: 0 % (ref 0–6)
ERYTHROCYTE [DISTWIDTH] IN BLOOD BY AUTOMATED COUNT: 14.3 % (ref 11.5–15)
GFR, ESTIMATED: >90 ML/MIN/1.73M2
GLUCOSE SERPL-MCNC: 127 MG/DL (ref 74–99)
HCT VFR BLD AUTO: 35.3 % (ref 34–48)
HGB BLD-MCNC: 11.9 G/DL (ref 11.5–15.5)
IMM GRANULOCYTES # BLD AUTO: 0.06 K/UL (ref 0–0.58)
IMM GRANULOCYTES NFR BLD: 1 % (ref 0–5)
INR PPP: 4.2
LYMPHOCYTES NFR BLD: 0.64 K/UL (ref 1.5–4)
LYMPHOCYTES RELATIVE PERCENT: 5 % (ref 20–42)
MCH RBC QN AUTO: 28.4 PG (ref 26–35)
MCHC RBC AUTO-ENTMCNC: 33.7 G/DL (ref 32–34.5)
MCV RBC AUTO: 84.2 FL (ref 80–99.9)
MONOCYTES NFR BLD: 0.53 K/UL (ref 0.1–0.95)
MONOCYTES NFR BLD: 4 % (ref 2–12)
NEUTROPHILS NFR BLD: 90 % (ref 43–80)
NEUTS SEG NFR BLD: 11.63 K/UL (ref 1.8–7.3)
PLATELET # BLD AUTO: 171 K/UL (ref 130–450)
PMV BLD AUTO: 10.1 FL (ref 7–12)
POTASSIUM SERPL-SCNC: 4.4 MMOL/L (ref 3.5–5.1)
PROT SERPL-MCNC: 6.6 G/DL (ref 6.4–8.3)
PROTHROMBIN TIME: 45.1 SEC (ref 9.3–12.4)
RBC # BLD AUTO: 4.19 M/UL (ref 3.5–5.5)
SODIUM SERPL-SCNC: 136 MMOL/L (ref 136–145)
WBC OTHER # BLD: 12.9 K/UL (ref 4.5–11.5)

## 2025-07-30 PROCEDURE — 6370000000 HC RX 637 (ALT 250 FOR IP)

## 2025-07-30 PROCEDURE — 6370000000 HC RX 637 (ALT 250 FOR IP): Performed by: NURSE PRACTITIONER

## 2025-07-30 PROCEDURE — 99223 1ST HOSP IP/OBS HIGH 75: CPT | Performed by: STUDENT IN AN ORGANIZED HEALTH CARE EDUCATION/TRAINING PROGRAM

## 2025-07-30 PROCEDURE — 86850 RBC ANTIBODY SCREEN: CPT

## 2025-07-30 PROCEDURE — 80053 COMPREHEN METABOLIC PANEL: CPT

## 2025-07-30 PROCEDURE — 1200000000 HC SEMI PRIVATE

## 2025-07-30 PROCEDURE — 85610 PROTHROMBIN TIME: CPT

## 2025-07-30 PROCEDURE — 99285 EMERGENCY DEPT VISIT HI MDM: CPT

## 2025-07-30 PROCEDURE — 73502 X-RAY EXAM HIP UNI 2-3 VIEWS: CPT

## 2025-07-30 PROCEDURE — 72192 CT PELVIS W/O DYE: CPT

## 2025-07-30 PROCEDURE — 71045 X-RAY EXAM CHEST 1 VIEW: CPT

## 2025-07-30 PROCEDURE — 2500000003 HC RX 250 WO HCPCS: Performed by: NURSE PRACTITIONER

## 2025-07-30 PROCEDURE — 6360000002 HC RX W HCPCS: Performed by: NURSE PRACTITIONER

## 2025-07-30 PROCEDURE — 85025 COMPLETE CBC W/AUTO DIFF WBC: CPT

## 2025-07-30 PROCEDURE — 86900 BLOOD TYPING SEROLOGIC ABO: CPT

## 2025-07-30 PROCEDURE — 72131 CT LUMBAR SPINE W/O DYE: CPT

## 2025-07-30 PROCEDURE — 6360000002 HC RX W HCPCS

## 2025-07-30 PROCEDURE — 86901 BLOOD TYPING SEROLOGIC RH(D): CPT

## 2025-07-30 RX ORDER — SODIUM CHLORIDE 0.9 % (FLUSH) 0.9 %
5-40 SYRINGE (ML) INJECTION EVERY 12 HOURS SCHEDULED
Status: DISCONTINUED | OUTPATIENT
Start: 2025-07-30 | End: 2025-08-04 | Stop reason: HOSPADM

## 2025-07-30 RX ORDER — SODIUM CHLORIDE 9 MG/ML
INJECTION, SOLUTION INTRAVENOUS PRN
Status: DISCONTINUED | OUTPATIENT
Start: 2025-07-30 | End: 2025-08-04 | Stop reason: HOSPADM

## 2025-07-30 RX ORDER — HYDROCODONE BITARTRATE AND ACETAMINOPHEN 5; 325 MG/1; MG/1
1 TABLET ORAL EVERY 6 HOURS PRN
Status: DISCONTINUED | OUTPATIENT
Start: 2025-07-30 | End: 2025-07-31

## 2025-07-30 RX ORDER — MAGNESIUM SULFATE IN WATER 40 MG/ML
2000 INJECTION, SOLUTION INTRAVENOUS PRN
Status: DISCONTINUED | OUTPATIENT
Start: 2025-07-30 | End: 2025-08-04 | Stop reason: HOSPADM

## 2025-07-30 RX ORDER — ATORVASTATIN CALCIUM 40 MG/1
40 TABLET, FILM COATED ORAL DAILY
Status: DISCONTINUED | OUTPATIENT
Start: 2025-07-30 | End: 2025-08-04 | Stop reason: HOSPADM

## 2025-07-30 RX ORDER — ACETAMINOPHEN 650 MG/1
650 SUPPOSITORY RECTAL EVERY 6 HOURS PRN
Status: DISCONTINUED | OUTPATIENT
Start: 2025-07-30 | End: 2025-08-04 | Stop reason: HOSPADM

## 2025-07-30 RX ORDER — MORPHINE SULFATE 2 MG/ML
INJECTION, SOLUTION INTRAMUSCULAR; INTRAVENOUS
Status: COMPLETED
Start: 2025-07-30 | End: 2025-07-30

## 2025-07-30 RX ORDER — ONDANSETRON 2 MG/ML
4 INJECTION INTRAMUSCULAR; INTRAVENOUS EVERY 6 HOURS PRN
Status: DISCONTINUED | OUTPATIENT
Start: 2025-07-30 | End: 2025-08-04 | Stop reason: HOSPADM

## 2025-07-30 RX ORDER — POTASSIUM CHLORIDE 7.45 MG/ML
10 INJECTION INTRAVENOUS PRN
Status: DISCONTINUED | OUTPATIENT
Start: 2025-07-30 | End: 2025-08-04 | Stop reason: HOSPADM

## 2025-07-30 RX ORDER — DOCUSATE SODIUM 100 MG/1
100 CAPSULE, LIQUID FILLED ORAL 2 TIMES DAILY PRN
Status: DISCONTINUED | OUTPATIENT
Start: 2025-07-30 | End: 2025-07-31

## 2025-07-30 RX ORDER — ANTIOX #8/OM3/DHA/EPA/LUT/ZEAX 250-2.5 MG
1 CAPSULE ORAL EVERY MORNING
COMMUNITY

## 2025-07-30 RX ORDER — METOPROLOL SUCCINATE 50 MG/1
50 TABLET, EXTENDED RELEASE ORAL EVERY MORNING
COMMUNITY

## 2025-07-30 RX ORDER — SODIUM CHLORIDE 0.9 % (FLUSH) 0.9 %
10 SYRINGE (ML) INJECTION PRN
Status: DISCONTINUED | OUTPATIENT
Start: 2025-07-30 | End: 2025-08-04 | Stop reason: HOSPADM

## 2025-07-30 RX ORDER — MORPHINE SULFATE 2 MG/ML
2 INJECTION, SOLUTION INTRAMUSCULAR; INTRAVENOUS EVERY 4 HOURS PRN
Refills: 0 | Status: DISCONTINUED | OUTPATIENT
Start: 2025-07-30 | End: 2025-08-04 | Stop reason: HOSPADM

## 2025-07-30 RX ORDER — ANTIOX #8/OM3/DHA/EPA/LUT/ZEAX 250-2.5 MG
1 CAPSULE ORAL EVERY MORNING
Status: DISCONTINUED | OUTPATIENT
Start: 2025-07-31 | End: 2025-08-04 | Stop reason: HOSPADM

## 2025-07-30 RX ORDER — CHOLECALCIFEROL (VITAMIN D3) 50 MCG
2000 TABLET ORAL EVERY MORNING
Status: DISCONTINUED | OUTPATIENT
Start: 2025-07-31 | End: 2025-08-04 | Stop reason: HOSPADM

## 2025-07-30 RX ORDER — POLYETHYLENE GLYCOL 3350 17 G/17G
17 POWDER, FOR SOLUTION ORAL DAILY PRN
Status: DISCONTINUED | OUTPATIENT
Start: 2025-07-30 | End: 2025-07-31

## 2025-07-30 RX ORDER — ONDANSETRON 4 MG/1
4 TABLET, ORALLY DISINTEGRATING ORAL EVERY 8 HOURS PRN
Status: DISCONTINUED | OUTPATIENT
Start: 2025-07-30 | End: 2025-08-04 | Stop reason: HOSPADM

## 2025-07-30 RX ORDER — ACETAMINOPHEN 325 MG/1
650 TABLET ORAL EVERY 6 HOURS PRN
Status: DISCONTINUED | OUTPATIENT
Start: 2025-07-30 | End: 2025-08-04 | Stop reason: HOSPADM

## 2025-07-30 RX ORDER — METOPROLOL SUCCINATE 50 MG/1
50 TABLET, EXTENDED RELEASE ORAL EVERY MORNING
Status: DISCONTINUED | OUTPATIENT
Start: 2025-07-31 | End: 2025-08-04 | Stop reason: HOSPADM

## 2025-07-30 RX ORDER — POTASSIUM CHLORIDE 1500 MG/1
40 TABLET, EXTENDED RELEASE ORAL PRN
Status: DISCONTINUED | OUTPATIENT
Start: 2025-07-30 | End: 2025-08-04 | Stop reason: HOSPADM

## 2025-07-30 RX ADMIN — ATORVASTATIN CALCIUM 40 MG: 40 TABLET, FILM COATED ORAL at 17:35

## 2025-07-30 RX ADMIN — MORPHINE SULFATE 2 MG: 2 INJECTION, SOLUTION INTRAMUSCULAR; INTRAVENOUS at 16:10

## 2025-07-30 RX ADMIN — HYDROCODONE BITARTRATE AND ACETAMINOPHEN 1 TABLET: 5; 325 TABLET ORAL at 13:11

## 2025-07-30 RX ADMIN — HYDROCODONE BITARTRATE AND ACETAMINOPHEN 1 TABLET: 5; 325 TABLET ORAL at 20:51

## 2025-07-30 RX ADMIN — SODIUM CHLORIDE, PRESERVATIVE FREE 10 ML: 5 INJECTION INTRAVENOUS at 20:50

## 2025-07-30 RX ADMIN — ONDANSETRON 4 MG: 2 INJECTION, SOLUTION INTRAMUSCULAR; INTRAVENOUS at 16:16

## 2025-07-30 ASSESSMENT — PAIN DESCRIPTION - LOCATION
LOCATION: PELVIS
LOCATION: HIP

## 2025-07-30 ASSESSMENT — PAIN DESCRIPTION - ORIENTATION
ORIENTATION: RIGHT

## 2025-07-30 ASSESSMENT — PAIN SCALES - GENERAL
PAINLEVEL_OUTOF10: 5
PAINLEVEL_OUTOF10: 8
PAINLEVEL_OUTOF10: 10
PAINLEVEL_OUTOF10: 8

## 2025-07-30 ASSESSMENT — PAIN - FUNCTIONAL ASSESSMENT
PAIN_FUNCTIONAL_ASSESSMENT: 0-10
PAIN_FUNCTIONAL_ASSESSMENT: 0-10

## 2025-07-30 ASSESSMENT — PAIN DESCRIPTION - DESCRIPTORS: DESCRIPTORS: ACHING;DISCOMFORT

## 2025-07-31 PROBLEM — R79.1 ELEVATED INR: Status: ACTIVE | Noted: 2025-07-31

## 2025-07-31 LAB
ANION GAP SERPL CALCULATED.3IONS-SCNC: 14 MMOL/L (ref 7–16)
BASOPHILS # BLD: 0.02 K/UL (ref 0–0.2)
BASOPHILS NFR BLD: 0 % (ref 0–2)
BUN SERPL-MCNC: 21 MG/DL (ref 8–23)
CALCIUM SERPL-MCNC: 8.5 MG/DL (ref 8.8–10.2)
CHLORIDE SERPL-SCNC: 100 MMOL/L (ref 98–107)
CO2 SERPL-SCNC: 22 MMOL/L (ref 22–29)
CREAT SERPL-MCNC: 0.6 MG/DL (ref 0.5–1)
EOSINOPHIL # BLD: 0.01 K/UL (ref 0.05–0.5)
EOSINOPHILS RELATIVE PERCENT: 0 % (ref 0–6)
ERYTHROCYTE [DISTWIDTH] IN BLOOD BY AUTOMATED COUNT: 14.4 % (ref 11.5–15)
GFR, ESTIMATED: 90 ML/MIN/1.73M2
GLUCOSE SERPL-MCNC: 122 MG/DL (ref 74–99)
HCT VFR BLD AUTO: 34.1 % (ref 34–48)
HGB BLD-MCNC: 11.7 G/DL (ref 11.5–15.5)
IMM GRANULOCYTES # BLD AUTO: <0.03 K/UL (ref 0–0.58)
IMM GRANULOCYTES NFR BLD: 0 % (ref 0–5)
INR PPP: 5.6
LYMPHOCYTES NFR BLD: 1.27 K/UL (ref 1.5–4)
LYMPHOCYTES RELATIVE PERCENT: 14 % (ref 20–42)
MCH RBC QN AUTO: 28.6 PG (ref 26–35)
MCHC RBC AUTO-ENTMCNC: 34.3 G/DL (ref 32–34.5)
MCV RBC AUTO: 83.4 FL (ref 80–99.9)
MONOCYTES NFR BLD: 0.65 K/UL (ref 0.1–0.95)
MONOCYTES NFR BLD: 7 % (ref 2–12)
NEUTROPHILS NFR BLD: 79 % (ref 43–80)
NEUTS SEG NFR BLD: 7.29 K/UL (ref 1.8–7.3)
PLATELET # BLD AUTO: 188 K/UL (ref 130–450)
PMV BLD AUTO: 10.4 FL (ref 7–12)
POTASSIUM SERPL-SCNC: 3.9 MMOL/L (ref 3.5–5.1)
PROTHROMBIN TIME: 60 SEC (ref 9.3–12.4)
RBC # BLD AUTO: 4.09 M/UL (ref 3.5–5.5)
SODIUM SERPL-SCNC: 136 MMOL/L (ref 136–145)
WBC OTHER # BLD: 9.3 K/UL (ref 4.5–11.5)

## 2025-07-31 PROCEDURE — 97530 THERAPEUTIC ACTIVITIES: CPT

## 2025-07-31 PROCEDURE — 2500000003 HC RX 250 WO HCPCS: Performed by: NURSE PRACTITIONER

## 2025-07-31 PROCEDURE — 6370000000 HC RX 637 (ALT 250 FOR IP)

## 2025-07-31 PROCEDURE — 80048 BASIC METABOLIC PNL TOTAL CA: CPT

## 2025-07-31 PROCEDURE — 6370000000 HC RX 637 (ALT 250 FOR IP): Performed by: NURSE PRACTITIONER

## 2025-07-31 PROCEDURE — 1200000000 HC SEMI PRIVATE

## 2025-07-31 PROCEDURE — 85025 COMPLETE CBC W/AUTO DIFF WBC: CPT

## 2025-07-31 PROCEDURE — 6360000002 HC RX W HCPCS: Performed by: NURSE PRACTITIONER

## 2025-07-31 PROCEDURE — 97161 PT EVAL LOW COMPLEX 20 MIN: CPT

## 2025-07-31 PROCEDURE — 97165 OT EVAL LOW COMPLEX 30 MIN: CPT

## 2025-07-31 PROCEDURE — 85610 PROTHROMBIN TIME: CPT

## 2025-07-31 PROCEDURE — 6370000000 HC RX 637 (ALT 250 FOR IP): Performed by: INTERNAL MEDICINE

## 2025-07-31 PROCEDURE — 97535 SELF CARE MNGMENT TRAINING: CPT

## 2025-07-31 RX ORDER — HYDROCODONE BITARTRATE AND ACETAMINOPHEN 5; 325 MG/1; MG/1
1 TABLET ORAL EVERY 4 HOURS PRN
Status: DISCONTINUED | OUTPATIENT
Start: 2025-07-31 | End: 2025-08-01

## 2025-07-31 RX ORDER — DOCUSATE SODIUM 100 MG/1
100 CAPSULE, LIQUID FILLED ORAL DAILY
Status: DISCONTINUED | OUTPATIENT
Start: 2025-07-31 | End: 2025-08-04 | Stop reason: HOSPADM

## 2025-07-31 RX ORDER — SENNOSIDES 8.6 MG/1
1 TABLET ORAL EVERY 12 HOURS PRN
Status: DISCONTINUED | OUTPATIENT
Start: 2025-07-31 | End: 2025-08-04 | Stop reason: HOSPADM

## 2025-07-31 RX ORDER — POLYETHYLENE GLYCOL 3350 17 G/17G
17 POWDER, FOR SOLUTION ORAL DAILY
Status: DISCONTINUED | OUTPATIENT
Start: 2025-07-31 | End: 2025-08-04 | Stop reason: HOSPADM

## 2025-07-31 RX ADMIN — HYDROCODONE BITARTRATE AND ACETAMINOPHEN 1 TABLET: 5; 325 TABLET ORAL at 14:04

## 2025-07-31 RX ADMIN — Medication 2000 UNITS: at 09:11

## 2025-07-31 RX ADMIN — HYDROCODONE BITARTRATE AND ACETAMINOPHEN 1 TABLET: 5; 325 TABLET ORAL at 20:18

## 2025-07-31 RX ADMIN — DOCUSATE SODIUM 100 MG: 100 CAPSULE, LIQUID FILLED ORAL at 16:09

## 2025-07-31 RX ADMIN — ONDANSETRON 4 MG: 2 INJECTION, SOLUTION INTRAMUSCULAR; INTRAVENOUS at 02:38

## 2025-07-31 RX ADMIN — SODIUM CHLORIDE, PRESERVATIVE FREE 10 ML: 5 INJECTION INTRAVENOUS at 09:12

## 2025-07-31 RX ADMIN — HYDROCODONE BITARTRATE AND ACETAMINOPHEN 1 TABLET: 5; 325 TABLET ORAL at 09:11

## 2025-07-31 RX ADMIN — ATORVASTATIN CALCIUM 40 MG: 40 TABLET, FILM COATED ORAL at 09:11

## 2025-07-31 RX ADMIN — MORPHINE SULFATE 2 MG: 2 INJECTION, SOLUTION INTRAMUSCULAR; INTRAVENOUS at 02:30

## 2025-07-31 RX ADMIN — CALCIUM CARBONATE-VITAMIN D TAB 500 MG-200 UNIT 1 TABLET: 500-200 TAB at 09:11

## 2025-07-31 RX ADMIN — SODIUM CHLORIDE, PRESERVATIVE FREE 10 ML: 5 INJECTION INTRAVENOUS at 20:19

## 2025-07-31 RX ADMIN — METOPROLOL SUCCINATE 50 MG: 50 TABLET, EXTENDED RELEASE ORAL at 09:11

## 2025-07-31 RX ADMIN — POLYETHYLENE GLYCOL 3350 17 G: 17 POWDER, FOR SOLUTION ORAL at 16:09

## 2025-07-31 ASSESSMENT — PAIN DESCRIPTION - DESCRIPTORS
DESCRIPTORS: ACHING
DESCRIPTORS: SORE;DISCOMFORT

## 2025-07-31 ASSESSMENT — PAIN SCALES - GENERAL
PAINLEVEL_OUTOF10: 8
PAINLEVEL_OUTOF10: 8
PAINLEVEL_OUTOF10: 10
PAINLEVEL_OUTOF10: 8

## 2025-07-31 ASSESSMENT — PAIN DESCRIPTION - ORIENTATION
ORIENTATION: RIGHT

## 2025-07-31 ASSESSMENT — PAIN DESCRIPTION - LOCATION
LOCATION: HIP
LOCATION: LEG;FOOT
LOCATION: HIP
LOCATION: HIP;LEG

## 2025-07-31 ASSESSMENT — PAIN - FUNCTIONAL ASSESSMENT: PAIN_FUNCTIONAL_ASSESSMENT: PREVENTS OR INTERFERES WITH MANY ACTIVE NOT PASSIVE ACTIVITIES

## 2025-08-01 PROBLEM — Z86.79 HISTORY OF ATRIAL FIBRILLATION: Status: ACTIVE | Noted: 2025-08-01

## 2025-08-01 LAB
HCT VFR BLD AUTO: 29.6 % (ref 34–48)
HGB BLD-MCNC: 10 G/DL (ref 11.5–15.5)
INR PPP: 7.7
PROTHROMBIN TIME: 85.3 SEC (ref 9.3–12.4)

## 2025-08-01 PROCEDURE — 85014 HEMATOCRIT: CPT

## 2025-08-01 PROCEDURE — 6360000002 HC RX W HCPCS: Performed by: NURSE PRACTITIONER

## 2025-08-01 PROCEDURE — 6370000000 HC RX 637 (ALT 250 FOR IP): Performed by: INTERNAL MEDICINE

## 2025-08-01 PROCEDURE — 85018 HEMOGLOBIN: CPT

## 2025-08-01 PROCEDURE — 85610 PROTHROMBIN TIME: CPT

## 2025-08-01 PROCEDURE — 1200000000 HC SEMI PRIVATE

## 2025-08-01 PROCEDURE — 2500000003 HC RX 250 WO HCPCS: Performed by: NURSE PRACTITIONER

## 2025-08-01 PROCEDURE — 6370000000 HC RX 637 (ALT 250 FOR IP): Performed by: NURSE PRACTITIONER

## 2025-08-01 RX ORDER — HYDROCODONE BITARTRATE AND ACETAMINOPHEN 10; 325 MG/1; MG/1
1 TABLET ORAL EVERY 4 HOURS PRN
Refills: 0 | Status: DISCONTINUED | OUTPATIENT
Start: 2025-08-01 | End: 2025-08-04 | Stop reason: HOSPADM

## 2025-08-01 RX ORDER — PHYTONADIONE 5 MG/1
5 TABLET ORAL ONCE
Status: COMPLETED | OUTPATIENT
Start: 2025-08-01 | End: 2025-08-01

## 2025-08-01 RX ADMIN — HYDROCODONE BITARTRATE AND ACETAMINOPHEN 1 TABLET: 10; 325 TABLET ORAL at 22:46

## 2025-08-01 RX ADMIN — HYDROCODONE BITARTRATE AND ACETAMINOPHEN 1 TABLET: 5; 325 TABLET ORAL at 08:56

## 2025-08-01 RX ADMIN — ATORVASTATIN CALCIUM 40 MG: 40 TABLET, FILM COATED ORAL at 08:56

## 2025-08-01 RX ADMIN — DOCUSATE SODIUM 100 MG: 100 CAPSULE, LIQUID FILLED ORAL at 08:56

## 2025-08-01 RX ADMIN — MORPHINE SULFATE 2 MG: 2 INJECTION, SOLUTION INTRAMUSCULAR; INTRAVENOUS at 03:10

## 2025-08-01 RX ADMIN — SODIUM CHLORIDE, PRESERVATIVE FREE 10 ML: 5 INJECTION INTRAVENOUS at 08:56

## 2025-08-01 RX ADMIN — POLYETHYLENE GLYCOL 3350 17 G: 17 POWDER, FOR SOLUTION ORAL at 08:56

## 2025-08-01 RX ADMIN — CALCIUM CARBONATE-VITAMIN D TAB 500 MG-200 UNIT 1 TABLET: 500-200 TAB at 08:56

## 2025-08-01 RX ADMIN — HYDROCODONE BITARTRATE AND ACETAMINOPHEN 1 TABLET: 10; 325 TABLET ORAL at 17:21

## 2025-08-01 RX ADMIN — SODIUM CHLORIDE, PRESERVATIVE FREE 10 ML: 5 INJECTION INTRAVENOUS at 20:16

## 2025-08-01 RX ADMIN — MORPHINE SULFATE 2 MG: 2 INJECTION, SOLUTION INTRAMUSCULAR; INTRAVENOUS at 20:16

## 2025-08-01 RX ADMIN — PHYTONADIONE 5 MG: 5 TABLET ORAL at 05:13

## 2025-08-01 RX ADMIN — METOPROLOL SUCCINATE 50 MG: 50 TABLET, EXTENDED RELEASE ORAL at 08:56

## 2025-08-01 RX ADMIN — Medication 2000 UNITS: at 08:56

## 2025-08-01 RX ADMIN — HYDROCODONE BITARTRATE AND ACETAMINOPHEN 1 TABLET: 10; 325 TABLET ORAL at 13:04

## 2025-08-01 ASSESSMENT — PAIN DESCRIPTION - LOCATION
LOCATION: BACK
LOCATION: HIP
LOCATION: HIP
LOCATION: LEG
LOCATION: HIP;PELVIS
LOCATION: HIP;PELVIS

## 2025-08-01 ASSESSMENT — PAIN SCALES - WONG BAKER: WONGBAKER_NUMERICALRESPONSE: NO HURT

## 2025-08-01 ASSESSMENT — PAIN SCALES - GENERAL
PAINLEVEL_OUTOF10: 10
PAINLEVEL_OUTOF10: 6
PAINLEVEL_OUTOF10: 8
PAINLEVEL_OUTOF10: 3
PAINLEVEL_OUTOF10: 8
PAINLEVEL_OUTOF10: 5
PAINLEVEL_OUTOF10: 8

## 2025-08-01 ASSESSMENT — PAIN DESCRIPTION - ORIENTATION
ORIENTATION: LOWER
ORIENTATION: RIGHT

## 2025-08-01 ASSESSMENT — PAIN DESCRIPTION - DESCRIPTORS
DESCRIPTORS: ACHING
DESCRIPTORS: ACHING;DISCOMFORT;THROBBING
DESCRIPTORS: DISCOMFORT;TENDER;THROBBING
DESCRIPTORS: SPASM;SHARP
DESCRIPTORS: THROBBING

## 2025-08-02 LAB
HCT VFR BLD AUTO: 27 % (ref 34–48)
HGB BLD-MCNC: 9.2 G/DL (ref 11.5–15.5)
INR PPP: 1.6
PROTHROMBIN TIME: 16.3 SEC (ref 9.3–12.4)

## 2025-08-02 PROCEDURE — 85014 HEMATOCRIT: CPT

## 2025-08-02 PROCEDURE — 6370000000 HC RX 637 (ALT 250 FOR IP): Performed by: INTERNAL MEDICINE

## 2025-08-02 PROCEDURE — 85018 HEMOGLOBIN: CPT

## 2025-08-02 PROCEDURE — 97530 THERAPEUTIC ACTIVITIES: CPT

## 2025-08-02 PROCEDURE — 85610 PROTHROMBIN TIME: CPT

## 2025-08-02 PROCEDURE — 1200000000 HC SEMI PRIVATE

## 2025-08-02 PROCEDURE — 2500000003 HC RX 250 WO HCPCS: Performed by: NURSE PRACTITIONER

## 2025-08-02 PROCEDURE — 6370000000 HC RX 637 (ALT 250 FOR IP): Performed by: FAMILY MEDICINE

## 2025-08-02 PROCEDURE — 6370000000 HC RX 637 (ALT 250 FOR IP): Performed by: NURSE PRACTITIONER

## 2025-08-02 RX ORDER — WARFARIN SODIUM 2.5 MG/1
2.5 TABLET ORAL
Status: COMPLETED | OUTPATIENT
Start: 2025-08-02 | End: 2025-08-02

## 2025-08-02 RX ADMIN — SODIUM CHLORIDE, PRESERVATIVE FREE 10 ML: 5 INJECTION INTRAVENOUS at 20:02

## 2025-08-02 RX ADMIN — HYDROCODONE BITARTRATE AND ACETAMINOPHEN 1 TABLET: 10; 325 TABLET ORAL at 14:12

## 2025-08-02 RX ADMIN — Medication 2000 UNITS: at 08:22

## 2025-08-02 RX ADMIN — CALCIUM CARBONATE-VITAMIN D TAB 500 MG-200 UNIT 1 TABLET: 500-200 TAB at 08:22

## 2025-08-02 RX ADMIN — WARFARIN SODIUM 2.5 MG: 2.5 TABLET ORAL at 17:15

## 2025-08-02 RX ADMIN — HYDROCODONE BITARTRATE AND ACETAMINOPHEN 1 TABLET: 10; 325 TABLET ORAL at 20:50

## 2025-08-02 RX ADMIN — METOPROLOL SUCCINATE 50 MG: 50 TABLET, EXTENDED RELEASE ORAL at 08:22

## 2025-08-02 RX ADMIN — HYDROCODONE BITARTRATE AND ACETAMINOPHEN 1 TABLET: 10; 325 TABLET ORAL at 06:53

## 2025-08-02 RX ADMIN — ATORVASTATIN CALCIUM 40 MG: 40 TABLET, FILM COATED ORAL at 08:22

## 2025-08-02 RX ADMIN — DOCUSATE SODIUM 100 MG: 100 CAPSULE, LIQUID FILLED ORAL at 08:23

## 2025-08-02 RX ADMIN — POLYETHYLENE GLYCOL 3350 17 G: 17 POWDER, FOR SOLUTION ORAL at 08:22

## 2025-08-02 RX ADMIN — SODIUM CHLORIDE, PRESERVATIVE FREE 10 ML: 5 INJECTION INTRAVENOUS at 08:23

## 2025-08-02 ASSESSMENT — PAIN DESCRIPTION - DESCRIPTORS
DESCRIPTORS: ACHING;SORE
DESCRIPTORS: ACHING;TENDER;THROBBING
DESCRIPTORS: ACHING;DISCOMFORT;STABBING

## 2025-08-02 ASSESSMENT — PAIN SCALES - GENERAL
PAINLEVEL_OUTOF10: 7
PAINLEVEL_OUTOF10: 6
PAINLEVEL_OUTOF10: 8
PAINLEVEL_OUTOF10: 8

## 2025-08-02 ASSESSMENT — PAIN DESCRIPTION - LOCATION
LOCATION: HIP
LOCATION: HIP

## 2025-08-02 ASSESSMENT — PAIN DESCRIPTION - ORIENTATION
ORIENTATION: RIGHT;LEFT
ORIENTATION: RIGHT
ORIENTATION: RIGHT

## 2025-08-03 LAB
INR PPP: 1.3
PROTHROMBIN TIME: 13.7 SEC (ref 9.3–12.4)

## 2025-08-03 PROCEDURE — 6370000000 HC RX 637 (ALT 250 FOR IP): Performed by: INTERNAL MEDICINE

## 2025-08-03 PROCEDURE — 6370000000 HC RX 637 (ALT 250 FOR IP): Performed by: NURSE PRACTITIONER

## 2025-08-03 PROCEDURE — 6370000000 HC RX 637 (ALT 250 FOR IP): Performed by: FAMILY MEDICINE

## 2025-08-03 PROCEDURE — 85610 PROTHROMBIN TIME: CPT

## 2025-08-03 PROCEDURE — 2500000003 HC RX 250 WO HCPCS: Performed by: NURSE PRACTITIONER

## 2025-08-03 PROCEDURE — 1200000000 HC SEMI PRIVATE

## 2025-08-03 PROCEDURE — 6360000002 HC RX W HCPCS: Performed by: FAMILY MEDICINE

## 2025-08-03 RX ORDER — WARFARIN SODIUM 5 MG/1
5 TABLET ORAL
Status: COMPLETED | OUTPATIENT
Start: 2025-08-03 | End: 2025-08-03

## 2025-08-03 RX ORDER — ENOXAPARIN SODIUM 100 MG/ML
1 INJECTION SUBCUTANEOUS 2 TIMES DAILY
Status: DISCONTINUED | OUTPATIENT
Start: 2025-08-03 | End: 2025-08-04 | Stop reason: HOSPADM

## 2025-08-03 RX ADMIN — HYDROCODONE BITARTRATE AND ACETAMINOPHEN 1 TABLET: 10; 325 TABLET ORAL at 11:01

## 2025-08-03 RX ADMIN — ENOXAPARIN SODIUM 60 MG: 100 INJECTION SUBCUTANEOUS at 14:10

## 2025-08-03 RX ADMIN — HYDROCODONE BITARTRATE AND ACETAMINOPHEN 1 TABLET: 10; 325 TABLET ORAL at 02:40

## 2025-08-03 RX ADMIN — HYDROCODONE BITARTRATE AND ACETAMINOPHEN 1 TABLET: 10; 325 TABLET ORAL at 17:56

## 2025-08-03 RX ADMIN — POLYETHYLENE GLYCOL 3350 17 G: 17 POWDER, FOR SOLUTION ORAL at 09:43

## 2025-08-03 RX ADMIN — PETROLATUM: 420 OINTMENT TOPICAL at 21:35

## 2025-08-03 RX ADMIN — Medication 2000 UNITS: at 09:43

## 2025-08-03 RX ADMIN — DOCUSATE SODIUM 100 MG: 100 CAPSULE, LIQUID FILLED ORAL at 09:43

## 2025-08-03 RX ADMIN — WARFARIN SODIUM 5 MG: 5 TABLET ORAL at 17:56

## 2025-08-03 RX ADMIN — SODIUM CHLORIDE, PRESERVATIVE FREE 10 ML: 5 INJECTION INTRAVENOUS at 09:43

## 2025-08-03 RX ADMIN — METOPROLOL SUCCINATE 50 MG: 50 TABLET, EXTENDED RELEASE ORAL at 09:43

## 2025-08-03 RX ADMIN — HYDROCODONE BITARTRATE AND ACETAMINOPHEN 1 TABLET: 10; 325 TABLET ORAL at 22:19

## 2025-08-03 RX ADMIN — ENOXAPARIN SODIUM 60 MG: 100 INJECTION SUBCUTANEOUS at 22:19

## 2025-08-03 RX ADMIN — CALCIUM CARBONATE-VITAMIN D TAB 500 MG-200 UNIT 1 TABLET: 500-200 TAB at 09:43

## 2025-08-03 RX ADMIN — SODIUM CHLORIDE, PRESERVATIVE FREE 10 ML: 5 INJECTION INTRAVENOUS at 21:35

## 2025-08-03 RX ADMIN — ATORVASTATIN CALCIUM 40 MG: 40 TABLET, FILM COATED ORAL at 09:43

## 2025-08-03 ASSESSMENT — PAIN SCALES - GENERAL
PAINLEVEL_OUTOF10: 8
PAINLEVEL_OUTOF10: 6
PAINLEVEL_OUTOF10: 7
PAINLEVEL_OUTOF10: 7
PAINLEVEL_OUTOF10: 0
PAINLEVEL_OUTOF10: 7

## 2025-08-03 ASSESSMENT — PAIN - FUNCTIONAL ASSESSMENT: PAIN_FUNCTIONAL_ASSESSMENT: PREVENTS OR INTERFERES SOME ACTIVE ACTIVITIES AND ADLS

## 2025-08-03 ASSESSMENT — PAIN DESCRIPTION - DESCRIPTORS
DESCRIPTORS: ACHING;DISCOMFORT
DESCRIPTORS: ACHING;THROBBING;SORE
DESCRIPTORS: ACHING;TENDER
DESCRIPTORS: ACHING;SORE;THROBBING

## 2025-08-03 ASSESSMENT — PAIN DESCRIPTION - ORIENTATION
ORIENTATION: OTHER (COMMENT)
ORIENTATION: RIGHT;LEFT

## 2025-08-03 ASSESSMENT — PAIN DESCRIPTION - LOCATION
LOCATION: PELVIS
LOCATION: PELVIS
LOCATION: GENERALIZED
LOCATION: PELVIS

## 2025-08-04 VITALS
OXYGEN SATURATION: 95 % | HEIGHT: 59 IN | HEART RATE: 99 BPM | RESPIRATION RATE: 16 BRPM | SYSTOLIC BLOOD PRESSURE: 127 MMHG | BODY MASS INDEX: 27.29 KG/M2 | TEMPERATURE: 99 F | DIASTOLIC BLOOD PRESSURE: 71 MMHG | WEIGHT: 135.36 LBS

## 2025-08-04 LAB
INR PPP: 1.7
PROTHROMBIN TIME: 18.6 SEC (ref 9.3–12.4)

## 2025-08-04 PROCEDURE — 6370000000 HC RX 637 (ALT 250 FOR IP): Performed by: FAMILY MEDICINE

## 2025-08-04 PROCEDURE — 6370000000 HC RX 637 (ALT 250 FOR IP): Performed by: INTERNAL MEDICINE

## 2025-08-04 PROCEDURE — 2500000003 HC RX 250 WO HCPCS: Performed by: NURSE PRACTITIONER

## 2025-08-04 PROCEDURE — 6370000000 HC RX 637 (ALT 250 FOR IP): Performed by: NURSE PRACTITIONER

## 2025-08-04 PROCEDURE — 85610 PROTHROMBIN TIME: CPT

## 2025-08-04 PROCEDURE — 6360000002 HC RX W HCPCS: Performed by: FAMILY MEDICINE

## 2025-08-04 RX ORDER — WARFARIN SODIUM 2.5 MG/1
2.5 TABLET ORAL
Status: DISCONTINUED | OUTPATIENT
Start: 2025-08-04 | End: 2025-08-04

## 2025-08-04 RX ORDER — HYDROCODONE BITARTRATE AND ACETAMINOPHEN 10; 325 MG/1; MG/1
1 TABLET ORAL EVERY 4 HOURS PRN
Qty: 18 TABLET | Refills: 0 | Status: SHIPPED | OUTPATIENT
Start: 2025-08-04 | End: 2025-08-07

## 2025-08-04 RX ORDER — WARFARIN SODIUM 2.5 MG/1
2.5 TABLET ORAL
Status: COMPLETED | OUTPATIENT
Start: 2025-08-04 | End: 2025-08-04

## 2025-08-04 RX ADMIN — POLYETHYLENE GLYCOL 3350 17 G: 17 POWDER, FOR SOLUTION ORAL at 10:08

## 2025-08-04 RX ADMIN — HYDROCODONE BITARTRATE AND ACETAMINOPHEN 1 TABLET: 10; 325 TABLET ORAL at 10:06

## 2025-08-04 RX ADMIN — DOCUSATE SODIUM 100 MG: 100 CAPSULE, LIQUID FILLED ORAL at 10:07

## 2025-08-04 RX ADMIN — CALCIUM CARBONATE-VITAMIN D TAB 500 MG-200 UNIT 1 TABLET: 500-200 TAB at 10:07

## 2025-08-04 RX ADMIN — WARFARIN SODIUM 2.5 MG: 2.5 TABLET ORAL at 14:21

## 2025-08-04 RX ADMIN — PETROLATUM: 420 OINTMENT TOPICAL at 10:08

## 2025-08-04 RX ADMIN — Medication 2000 UNITS: at 10:07

## 2025-08-04 RX ADMIN — SODIUM CHLORIDE, PRESERVATIVE FREE 10 ML: 5 INJECTION INTRAVENOUS at 10:08

## 2025-08-04 RX ADMIN — ATORVASTATIN CALCIUM 40 MG: 40 TABLET, FILM COATED ORAL at 10:07

## 2025-08-04 RX ADMIN — METOPROLOL SUCCINATE 50 MG: 50 TABLET, EXTENDED RELEASE ORAL at 10:07

## 2025-08-04 RX ADMIN — ENOXAPARIN SODIUM 60 MG: 100 INJECTION SUBCUTANEOUS at 10:07

## 2025-08-04 ASSESSMENT — PAIN DESCRIPTION - LOCATION: LOCATION: HIP

## 2025-08-04 ASSESSMENT — PAIN DESCRIPTION - DESCRIPTORS: DESCRIPTORS: ACHING

## 2025-08-04 ASSESSMENT — PAIN SCALES - GENERAL: PAINLEVEL_OUTOF10: 6

## 2025-08-04 ASSESSMENT — PAIN DESCRIPTION - ORIENTATION: ORIENTATION: RIGHT

## 2025-08-06 ENCOUNTER — OUTSIDE SERVICES (OUTPATIENT)
Dept: PRIMARY CARE CLINIC | Age: 85
End: 2025-08-06
Payer: MEDICARE

## 2025-08-06 DIAGNOSIS — S32.82XS MULTIPLE CLOSED FRACTURES OF PELVIS WITHOUT DISRUPTION OF PELVIC RING, SEQUELA: Primary | ICD-10-CM

## 2025-08-06 DIAGNOSIS — Z79.01 CHRONIC ANTICOAGULATION: ICD-10-CM

## 2025-08-06 DIAGNOSIS — Z86.79 HISTORY OF ATRIAL FIBRILLATION: ICD-10-CM

## 2025-08-06 DIAGNOSIS — Z91.81 AT MAXIMUM RISK FOR FALL: ICD-10-CM

## 2025-08-06 DIAGNOSIS — R53.81 PHYSICAL DECONDITIONING: ICD-10-CM
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
## 2025-08-06 DIAGNOSIS — R53.1 GENERALIZED WEAKNESS: ICD-10-CM

## 2025-08-06 DIAGNOSIS — N94.89 PELVIC HEMATOMA IN FEMALE: ICD-10-CM

## 2025-08-06 PROCEDURE — 99305 1ST NF CARE MODERATE MDM 35: CPT | Performed by: STUDENT IN AN ORGANIZED HEALTH CARE EDUCATION/TRAINING PROGRAM

## 2025-08-12 ENCOUNTER — OFFICE VISIT (OUTPATIENT)
Dept: ORTHOPEDIC SURGERY | Age: 85
End: 2025-08-12
Payer: MEDICARE

## 2025-08-12 VITALS
HEART RATE: 97 BPM | OXYGEN SATURATION: 95 % | SYSTOLIC BLOOD PRESSURE: 115 MMHG | DIASTOLIC BLOOD PRESSURE: 72 MMHG | TEMPERATURE: 98.1 F

## 2025-08-12 DIAGNOSIS — S32.591A CLOSED FRACTURE OF MULTIPLE RAMI OF RIGHT PUBIS, INITIAL ENCOUNTER (HCC): Primary | ICD-10-CM

## 2025-08-12 PROCEDURE — 1123F ACP DISCUSS/DSCN MKR DOCD: CPT | Performed by: STUDENT IN AN ORGANIZED HEALTH CARE EDUCATION/TRAINING PROGRAM

## 2025-08-12 PROCEDURE — 99213 OFFICE O/P EST LOW 20 MIN: CPT | Performed by: STUDENT IN AN ORGANIZED HEALTH CARE EDUCATION/TRAINING PROGRAM

## 2025-08-12 PROCEDURE — 1159F MED LIST DOCD IN RCRD: CPT | Performed by: STUDENT IN AN ORGANIZED HEALTH CARE EDUCATION/TRAINING PROGRAM

## 2025-08-12 RX ORDER — IBUPROFEN 200 MG
1 CAPSULE ORAL DAILY
COMMUNITY

## 2025-08-12 RX ORDER — HYDROCODONE BITARTRATE AND ACETAMINOPHEN 10; 325 MG/1; MG/1
1 TABLET ORAL EVERY 6 HOURS PRN
COMMUNITY

## 2025-08-12 RX ORDER — ATORVASTATIN CALCIUM 20 MG/1
20 TABLET, FILM COATED ORAL DAILY
COMMUNITY

## 2025-08-12 RX ORDER — POLYETHYLENE GLYCOL 3350 17 G/17G
17 POWDER, FOR SOLUTION ORAL DAILY
COMMUNITY

## 2025-08-12 RX ORDER — ACETAMINOPHEN 650 MG/1
650 SUPPOSITORY RECTAL EVERY 4 HOURS PRN
COMMUNITY

## 2025-08-20 ENCOUNTER — OUTSIDE SERVICES (OUTPATIENT)
Dept: PRIMARY CARE CLINIC | Age: 85
End: 2025-08-20
Payer: MEDICARE

## 2025-08-20 DIAGNOSIS — Z86.79 HISTORY OF ATRIAL FIBRILLATION: ICD-10-CM

## 2025-08-20 DIAGNOSIS — Z79.01 CHRONIC ANTICOAGULATION: ICD-10-CM

## 2025-08-20 DIAGNOSIS — S32.82XS MULTIPLE CLOSED FRACTURES OF PELVIS WITHOUT DISRUPTION OF PELVIC RING, SEQUELA: Primary | ICD-10-CM

## 2025-08-20 DIAGNOSIS — E78.5 HYPERLIPIDEMIA, UNSPECIFIED HYPERLIPIDEMIA TYPE: Chronic | ICD-10-CM
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
## 2025-08-20 PROCEDURE — 99309 SBSQ NF CARE MODERATE MDM 30: CPT | Performed by: STUDENT IN AN ORGANIZED HEALTH CARE EDUCATION/TRAINING PROGRAM

## 2025-08-22 ENCOUNTER — OUTSIDE SERVICES (OUTPATIENT)
Dept: PRIMARY CARE CLINIC | Age: 85
End: 2025-08-22
Payer: MEDICARE

## 2025-08-22 DIAGNOSIS — Z79.01 CHRONIC ANTICOAGULATION: ICD-10-CM

## 2025-08-22 DIAGNOSIS — Z86.79 HISTORY OF ATRIAL FIBRILLATION: ICD-10-CM
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
## 2025-08-22 DIAGNOSIS — E78.5 HYPERLIPIDEMIA, UNSPECIFIED HYPERLIPIDEMIA TYPE: ICD-10-CM

## 2025-08-22 DIAGNOSIS — I48.0 PAROXYSMAL ATRIAL FIBRILLATION (HCC): ICD-10-CM

## 2025-08-22 DIAGNOSIS — S32.82XS MULTIPLE CLOSED FRACTURES OF PELVIS WITHOUT DISRUPTION OF PELVIC RING, SEQUELA: Primary | ICD-10-CM

## 2025-08-22 PROCEDURE — 99309 SBSQ NF CARE MODERATE MDM 30: CPT | Performed by: STUDENT IN AN ORGANIZED HEALTH CARE EDUCATION/TRAINING PROGRAM

## 2025-08-27 ENCOUNTER — OUTSIDE SERVICES (OUTPATIENT)
Dept: PRIMARY CARE CLINIC | Age: 85
End: 2025-08-27
Payer: MEDICARE

## 2025-08-27 DIAGNOSIS — S32.82XS MULTIPLE CLOSED FRACTURES OF PELVIS WITHOUT DISRUPTION OF PELVIC RING, SEQUELA: Primary | ICD-10-CM

## 2025-08-27 DIAGNOSIS — E78.5 HYPERLIPIDEMIA, UNSPECIFIED HYPERLIPIDEMIA TYPE: ICD-10-CM
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
## 2025-08-27 DIAGNOSIS — Z86.79 HISTORY OF ATRIAL FIBRILLATION: ICD-10-CM

## 2025-08-27 PROCEDURE — 99309 SBSQ NF CARE MODERATE MDM 30: CPT | Performed by: STUDENT IN AN ORGANIZED HEALTH CARE EDUCATION/TRAINING PROGRAM

## (undated) DEVICE — NEEDLE SPNL L3.5IN PNK HUB S STL REG WALL FIT STYL W/ QNCKE

## (undated) DEVICE — SHEET, T, LAPAROTOMY, STERILE: Brand: MEDLINE

## (undated) DEVICE — GAUZE,SPONGE,4"X4",8PLY,STRL,LF,10/TRAY: Brand: MEDLINE

## (undated) DEVICE — MEDIA CONTRAST ISOVUE  300 10X50ML

## (undated) DEVICE — SYRINGE A08A KYPHX XPANDER INFLATION: Brand: KYPHON®  INFLATION SYRINGE

## (undated) DEVICE — STRIP,CLOSURE,WOUND,MEDI-STRIP,1/2X4: Brand: MEDLINE

## (undated) DEVICE — BANDAGE ADH W0.75XL3IN UNIV WVN FAB NAT GEN USE STRP N ADH

## (undated) DEVICE — MARKER,SKIN,WI/RULER AND LABELS: Brand: MEDLINE

## (undated) DEVICE — INTRODUCER T15K ONE STEP OID BEVEL: Brand: KYPHON® ONE-STEP™ OSTEO INTRODUCER™ SYSTEM

## (undated) DEVICE — COVER,MAYO STAND,STERILE: Brand: MEDLINE

## (undated) DEVICE — GOWN,SIRUS,FABRNF,XL,20/CS: Brand: MEDLINE

## (undated) DEVICE — DRAPE C ARM W41XL74IN UNIV MOB W RUBBERBAND CLP

## (undated) DEVICE — STANDARD HYPODERMIC NEEDLE,POLYPROPYLENE HUB: Brand: MONOJECT

## (undated) DEVICE — GAUZE,SPONGE,4"X4",16PLY,XRAY,STRL,LF: Brand: MEDLINE

## (undated) DEVICE — SWABSTICK SURG PREP BENZOIN TINCTURE SINGLE ST

## (undated) DEVICE — 1000 S-DRAPE TOWEL DRAPE 10/BX: Brand: STERI-DRAPE™

## (undated) DEVICE — SOLUTION IV IRRIG WATER 1000ML POUR BRL 2F7114

## (undated) DEVICE — SOLUTION IV IRRIG POUR BRL 0.9% SODIUM CHL 2F7124

## (undated) DEVICE — TOWEL,OR,DSP,ST,BLUE,STD,6/PK,12PK/CS: Brand: MEDLINE

## (undated) DEVICE — CHLORAPREP 26ML ORANGE

## (undated) DEVICE — DEVICE BIOP KYPHOPLASTY SZ 3

## (undated) DEVICE — KIT KPT1504 KYPAK TRY 15/3 ADDL FRACTURE: Brand: KYPHOPAK™ TRAY

## (undated) DEVICE — DOUBLE BASIN SET: Brand: MEDLINE INDUSTRIES, INC.

## (undated) DEVICE — DRAPE,REIN 53X77,STERILE: Brand: MEDLINE

## (undated) DEVICE — COVER,TABLE,60X90,STERILE: Brand: MEDLINE

## (undated) DEVICE — SYRINGE MED 10ML POLYPR LUERSLIP TIP FLAT TOP W/O SFTY DISP

## (undated) DEVICE — DRAPE THER FLUID WARMING 66X44 IN FLAT SLUSH DBL DISC ORS

## (undated) DEVICE — COUNTER NDL 30 COUNT DBL MAG